# Patient Record
Sex: FEMALE | Race: WHITE | NOT HISPANIC OR LATINO | Employment: OTHER | RURAL
[De-identification: names, ages, dates, MRNs, and addresses within clinical notes are randomized per-mention and may not be internally consistent; named-entity substitution may affect disease eponyms.]

---

## 2018-05-24 ENCOUNTER — HISTORICAL (OUTPATIENT)
Dept: ADMINISTRATIVE | Facility: HOSPITAL | Age: 73
End: 2018-05-24

## 2018-05-25 LAB
LAB AP CLINICAL INFORMATION: NORMAL
LAB AP COMMENTS: NORMAL
LAB AP DIAGNOSIS - HISTORICAL: NORMAL
LAB AP GROSS PATHOLOGY - HISTORICAL: NORMAL
LAB AP SPECIMEN SUBMITTED - HISTORICAL: NORMAL

## 2018-10-11 ENCOUNTER — HISTORICAL (OUTPATIENT)
Dept: ADMINISTRATIVE | Facility: HOSPITAL | Age: 73
End: 2018-10-11

## 2018-10-12 LAB
LAB AP CLINICAL INFORMATION: NORMAL
LAB AP DIAGNOSIS - HISTORICAL: NORMAL
LAB AP GROSS PATHOLOGY - HISTORICAL: NORMAL
LAB AP SPECIMEN SUBMITTED - HISTORICAL: NORMAL

## 2019-03-28 ENCOUNTER — HISTORICAL (OUTPATIENT)
Dept: ADMINISTRATIVE | Facility: HOSPITAL | Age: 74
End: 2019-03-28

## 2020-06-12 ENCOUNTER — HISTORICAL (OUTPATIENT)
Dept: ADMINISTRATIVE | Facility: HOSPITAL | Age: 75
End: 2020-06-12

## 2020-06-12 LAB
C COLI+JEJ+UPSA DNA STL QL NAA+NON-PROBE: NEGATIVE
E COLI SXT1 STL QL IA: NEGATIVE
E COLI SXT2 STL QL IA: NEGATIVE
GLUCOSE SERPL-MCNC: 178 MG/DL (ref 70–105)
NOROVIRUS GI+II RNA STL QL NAA+NON-PROBE: NEGATIVE
RVA RNA STL QL NAA+NON-PROBE: NEGATIVE
S ENT+BONG DNA STL QL NAA+NON-PROBE: NEGATIVE
SHIGELLA SPECIES NAT: NEGATIVE
V CHOL+PARA+VUL DNA STL QL NAA+NON-PROBE: NEGATIVE
Y ENTEROCOL DNA STL QL NAA+NON-PROBE: NEGATIVE

## 2020-06-13 LAB — C DIFF TOX A+B STL IA-ACNC: NEGATIVE

## 2020-06-15 LAB

## 2021-05-13 ENCOUNTER — OFFICE VISIT (OUTPATIENT)
Dept: GASTROENTEROLOGY | Facility: CLINIC | Age: 76
End: 2021-05-13
Payer: MEDICARE

## 2021-05-13 VITALS
SYSTOLIC BLOOD PRESSURE: 148 MMHG | BODY MASS INDEX: 30.36 KG/M2 | DIASTOLIC BLOOD PRESSURE: 59 MMHG | WEIGHT: 165 LBS | HEIGHT: 62 IN | OXYGEN SATURATION: 96 % | HEART RATE: 64 BPM

## 2021-05-13 DIAGNOSIS — K21.9 GASTROESOPHAGEAL REFLUX DISEASE, UNSPECIFIED WHETHER ESOPHAGITIS PRESENT: ICD-10-CM

## 2021-05-13 DIAGNOSIS — K59.00 CONSTIPATION, UNSPECIFIED CONSTIPATION TYPE: ICD-10-CM

## 2021-05-13 DIAGNOSIS — R10.13 EPIGASTRIC PAIN: Primary | ICD-10-CM

## 2021-05-13 PROCEDURE — 99214 PR OFFICE/OUTPT VISIT, EST, LEVL IV, 30-39 MIN: ICD-10-PCS | Mod: ,,, | Performed by: NURSE PRACTITIONER

## 2021-05-13 PROCEDURE — 99214 OFFICE O/P EST MOD 30 MIN: CPT | Mod: ,,, | Performed by: NURSE PRACTITIONER

## 2021-05-13 RX ORDER — HYDRALAZINE HYDROCHLORIDE 50 MG/1
50 TABLET, FILM COATED ORAL DAILY
COMMUNITY

## 2021-05-13 RX ORDER — TORSEMIDE 20 MG/1
10 TABLET ORAL DAILY
COMMUNITY

## 2021-05-13 RX ORDER — CITALOPRAM 20 MG/1
20 TABLET, FILM COATED ORAL DAILY
COMMUNITY

## 2021-05-13 RX ORDER — MESALAMINE 800 MG/1
800 TABLET, DELAYED RELEASE ORAL 2 TIMES DAILY
COMMUNITY
End: 2021-10-08 | Stop reason: SDUPTHER

## 2021-05-13 RX ORDER — VERAPAMIL HYDROCHLORIDE 240 MG/1
240 CAPSULE, EXTENDED RELEASE ORAL DAILY
COMMUNITY

## 2021-05-13 RX ORDER — ALLOPURINOL 100 MG/1
200 TABLET ORAL DAILY
COMMUNITY

## 2021-05-13 RX ORDER — GLYBURIDE 5 MG/1
5 TABLET ORAL 2 TIMES DAILY WITH MEALS
COMMUNITY

## 2021-05-13 RX ORDER — LEVOTHYROXINE SODIUM 50 UG/1
50 TABLET ORAL
COMMUNITY

## 2021-05-13 RX ORDER — AMOXICILLIN 500 MG
CAPSULE ORAL DAILY
COMMUNITY

## 2021-05-13 RX ORDER — ESOMEPRAZOLE MAGNESIUM 40 MG/1
40 CAPSULE, DELAYED RELEASE ORAL DAILY
COMMUNITY

## 2021-05-13 RX ORDER — ASPIRIN 81 MG/1
81 TABLET ORAL DAILY
COMMUNITY

## 2021-05-13 RX ORDER — PRAVASTATIN SODIUM 40 MG/1
40 TABLET ORAL DAILY
COMMUNITY

## 2021-05-13 RX ORDER — ATENOLOL 25 MG/1
25 TABLET ORAL DAILY
COMMUNITY

## 2021-05-14 ENCOUNTER — TELEPHONE (OUTPATIENT)
Dept: GASTROENTEROLOGY | Facility: CLINIC | Age: 76
End: 2021-05-14

## 2021-05-14 DIAGNOSIS — Z11.59 SPECIAL SCREENING EXAMINATION FOR VIRAL DISEASE: Primary | ICD-10-CM

## 2021-05-14 DIAGNOSIS — D64.9 ANEMIA, UNSPECIFIED TYPE: Primary | ICD-10-CM

## 2021-05-18 ENCOUNTER — ANESTHESIA EVENT (OUTPATIENT)
Dept: GASTROENTEROLOGY | Facility: HOSPITAL | Age: 76
End: 2021-05-18
Payer: MEDICARE

## 2021-05-18 ENCOUNTER — HOSPITAL ENCOUNTER (OUTPATIENT)
Dept: GASTROENTEROLOGY | Facility: HOSPITAL | Age: 76
Discharge: HOME | End: 2021-05-18
Attending: NURSE PRACTITIONER
Payer: MEDICARE

## 2021-05-18 ENCOUNTER — ANESTHESIA (OUTPATIENT)
Dept: GASTROENTEROLOGY | Facility: HOSPITAL | Age: 76
End: 2021-05-18
Payer: MEDICARE

## 2021-05-18 VITALS
SYSTOLIC BLOOD PRESSURE: 153 MMHG | RESPIRATION RATE: 13 BRPM | OXYGEN SATURATION: 97 % | DIASTOLIC BLOOD PRESSURE: 69 MMHG | TEMPERATURE: 97 F | HEART RATE: 63 BPM

## 2021-05-18 DIAGNOSIS — R10.13 EPIGASTRIC PAIN: ICD-10-CM

## 2021-05-18 DIAGNOSIS — K44.9 HH (HIATUS HERNIA): ICD-10-CM

## 2021-05-18 DIAGNOSIS — K21.9 GASTROESOPHAGEAL REFLUX DISEASE, UNSPECIFIED WHETHER ESOPHAGITIS PRESENT: ICD-10-CM

## 2021-05-18 DIAGNOSIS — K29.00 ACUTE SUPERFICIAL GASTRITIS WITHOUT HEMORRHAGE: ICD-10-CM

## 2021-05-18 PROCEDURE — C1889 IMPLANT/INSERT DEVICE, NOC: HCPCS

## 2021-05-18 PROCEDURE — 88305 SURGICAL PATHOLOGY: ICD-10-PCS | Mod: 26,,, | Performed by: PATHOLOGY

## 2021-05-18 PROCEDURE — D9220A PRA ANESTHESIA: ICD-10-PCS | Mod: ,,, | Performed by: NURSE ANESTHETIST, CERTIFIED REGISTERED

## 2021-05-18 PROCEDURE — 27000284 HC CANNULA NASAL: Performed by: NURSE ANESTHETIST, CERTIFIED REGISTERED

## 2021-05-18 PROCEDURE — 37000008 HC ANESTHESIA 1ST 15 MINUTES

## 2021-05-18 PROCEDURE — 88305 TISSUE EXAM BY PATHOLOGIST: CPT | Mod: SUR | Performed by: INTERNAL MEDICINE

## 2021-05-18 PROCEDURE — D9220A PRA ANESTHESIA: Mod: ,,, | Performed by: NURSE ANESTHETIST, CERTIFIED REGISTERED

## 2021-05-18 PROCEDURE — 25000003 PHARM REV CODE 250: Performed by: NURSE ANESTHETIST, CERTIFIED REGISTERED

## 2021-05-18 PROCEDURE — 88342 IMHCHEM/IMCYTCHM 1ST ANTB: CPT | Mod: 26,,, | Performed by: PATHOLOGY

## 2021-05-18 PROCEDURE — 63600175 PHARM REV CODE 636 W HCPCS: Performed by: NURSE ANESTHETIST, CERTIFIED REGISTERED

## 2021-05-18 PROCEDURE — 27201423 OPTIME MED/SURG SUP & DEVICES STERILE SUPPLY

## 2021-05-18 PROCEDURE — 43239 EGD BIOPSY SINGLE/MULTIPLE: CPT

## 2021-05-18 PROCEDURE — 88342 SURGICAL PATHOLOGY: ICD-10-PCS | Mod: 26,,, | Performed by: PATHOLOGY

## 2021-05-18 PROCEDURE — 88305 TISSUE EXAM BY PATHOLOGIST: CPT | Mod: 26,,, | Performed by: PATHOLOGY

## 2021-05-18 RX ORDER — SODIUM CHLORIDE 0.9 % (FLUSH) 0.9 %
10 SYRINGE (ML) INJECTION
Status: DISCONTINUED | OUTPATIENT
Start: 2021-05-18 | End: 2021-05-19 | Stop reason: HOSPADM

## 2021-05-18 RX ORDER — PROPOFOL 10 MG/ML
VIAL (ML) INTRAVENOUS
Status: DISCONTINUED | OUTPATIENT
Start: 2021-05-18 | End: 2021-05-18

## 2021-05-18 RX ORDER — SUCRALFATE 1 G/1
1 TABLET ORAL 4 TIMES DAILY PRN
Qty: 100 TABLET | Refills: 1 | Status: SHIPPED | OUTPATIENT
Start: 2021-05-18 | End: 2021-12-13 | Stop reason: SDUPTHER

## 2021-05-18 RX ORDER — LIDOCAINE HYDROCHLORIDE 20 MG/ML
INJECTION INTRAVENOUS
Status: DISCONTINUED | OUTPATIENT
Start: 2021-05-18 | End: 2021-05-18

## 2021-05-18 RX ORDER — ONDANSETRON 2 MG/ML
INJECTION INTRAMUSCULAR; INTRAVENOUS
Status: DISCONTINUED | OUTPATIENT
Start: 2021-05-18 | End: 2021-05-18

## 2021-05-18 RX ADMIN — SODIUM CHLORIDE: 9 INJECTION, SOLUTION INTRAVENOUS at 11:05

## 2021-05-18 RX ADMIN — PROPOFOL 50 MG: 10 INJECTION, EMULSION INTRAVENOUS at 11:05

## 2021-05-18 RX ADMIN — LIDOCAINE HYDROCHLORIDE 50 MG: 20 INJECTION, SOLUTION INTRAVENOUS at 11:05

## 2021-05-18 RX ADMIN — ONDANSETRON 4 MG: 2 INJECTION INTRAMUSCULAR; INTRAVENOUS at 12:05

## 2021-05-19 ENCOUNTER — TELEPHONE (OUTPATIENT)
Dept: GASTROENTEROLOGY | Facility: CLINIC | Age: 76
End: 2021-05-19

## 2021-05-19 LAB
DHEA SERPL-MCNC: NORMAL
ESTROGEN SERPL-MCNC: NORMAL PG/ML
LAB AP GROSS DESCRIPTION: NORMAL
LAB AP LABORATORY NOTES: NORMAL
T3RU NFR SERPL: NORMAL %

## 2021-05-21 ENCOUNTER — HOSPITAL ENCOUNTER (OUTPATIENT)
Dept: RADIOLOGY | Facility: HOSPITAL | Age: 76
Discharge: HOME | End: 2021-05-21
Attending: NURSE PRACTITIONER
Payer: MEDICARE

## 2021-05-21 DIAGNOSIS — R10.13 EPIGASTRIC PAIN: ICD-10-CM

## 2021-05-21 DIAGNOSIS — K21.9 GASTROESOPHAGEAL REFLUX DISEASE, UNSPECIFIED WHETHER ESOPHAGITIS PRESENT: ICD-10-CM

## 2021-05-21 PROCEDURE — 76700 US EXAM ABDOM COMPLETE: CPT | Mod: 26,,, | Performed by: RADIOLOGY

## 2021-05-21 PROCEDURE — 76700 US ABDOMEN COMPLETE: ICD-10-PCS | Mod: 26,,, | Performed by: RADIOLOGY

## 2021-05-21 PROCEDURE — 76700 US EXAM ABDOM COMPLETE: CPT | Mod: TC

## 2021-05-24 ENCOUNTER — TELEPHONE (OUTPATIENT)
Dept: GASTROENTEROLOGY | Facility: CLINIC | Age: 76
End: 2021-05-24

## 2021-07-19 ENCOUNTER — TELEPHONE (OUTPATIENT)
Dept: GASTROENTEROLOGY | Facility: CLINIC | Age: 76
End: 2021-07-19

## 2021-08-17 ENCOUNTER — OFFICE VISIT (OUTPATIENT)
Dept: GASTROENTEROLOGY | Facility: CLINIC | Age: 76
End: 2021-08-17
Payer: MEDICARE

## 2021-08-17 VITALS
SYSTOLIC BLOOD PRESSURE: 130 MMHG | HEART RATE: 67 BPM | HEIGHT: 62 IN | WEIGHT: 161 LBS | BODY MASS INDEX: 29.63 KG/M2 | OXYGEN SATURATION: 96 % | DIASTOLIC BLOOD PRESSURE: 56 MMHG

## 2021-08-17 DIAGNOSIS — K59.00 CONSTIPATION, UNSPECIFIED CONSTIPATION TYPE: ICD-10-CM

## 2021-08-17 DIAGNOSIS — K21.9 GASTROESOPHAGEAL REFLUX DISEASE, UNSPECIFIED WHETHER ESOPHAGITIS PRESENT: ICD-10-CM

## 2021-08-17 DIAGNOSIS — R63.4 WEIGHT LOSS: ICD-10-CM

## 2021-08-17 DIAGNOSIS — K62.89 RECTAL PAIN: ICD-10-CM

## 2021-08-17 DIAGNOSIS — R10.9 ABDOMINAL PAIN, UNSPECIFIED ABDOMINAL LOCATION: ICD-10-CM

## 2021-08-17 DIAGNOSIS — K62.5 RECTAL BLEEDING: Primary | ICD-10-CM

## 2021-08-17 PROCEDURE — 99214 PR OFFICE/OUTPT VISIT, EST, LEVL IV, 30-39 MIN: ICD-10-PCS | Mod: ,,, | Performed by: NURSE PRACTITIONER

## 2021-08-17 PROCEDURE — 99214 OFFICE O/P EST MOD 30 MIN: CPT | Mod: ,,, | Performed by: NURSE PRACTITIONER

## 2021-08-17 RX ORDER — HYDROCORTISONE ACETATE 25 MG/1
25 SUPPOSITORY RECTAL 2 TIMES DAILY
Qty: 20 SUPPOSITORY | Refills: 0 | Status: SHIPPED | OUTPATIENT
Start: 2021-08-17 | End: 2021-08-27

## 2021-09-10 ENCOUNTER — HOSPITAL ENCOUNTER (OUTPATIENT)
Dept: RADIOLOGY | Facility: HOSPITAL | Age: 76
Discharge: HOME | End: 2021-09-10
Attending: NURSE PRACTITIONER
Payer: MEDICARE

## 2021-09-10 DIAGNOSIS — R10.9 ABDOMINAL PAIN, UNSPECIFIED ABDOMINAL LOCATION: ICD-10-CM

## 2021-09-10 PROCEDURE — 74176 CT ABD & PELVIS W/O CONTRAST: CPT | Mod: TC

## 2021-09-10 PROCEDURE — 74176 CT ABDOMEN PELVIS WITHOUT CONTRAST: ICD-10-PCS | Mod: 26,,, | Performed by: RADIOLOGY

## 2021-09-10 PROCEDURE — 74176 CT ABD & PELVIS W/O CONTRAST: CPT | Mod: 26,,, | Performed by: RADIOLOGY

## 2021-09-13 ENCOUNTER — OFFICE VISIT (OUTPATIENT)
Dept: GASTROENTEROLOGY | Facility: CLINIC | Age: 76
End: 2021-09-13
Payer: MEDICARE

## 2021-09-13 VITALS
HEIGHT: 62 IN | DIASTOLIC BLOOD PRESSURE: 47 MMHG | WEIGHT: 162 LBS | OXYGEN SATURATION: 96 % | BODY MASS INDEX: 29.81 KG/M2 | SYSTOLIC BLOOD PRESSURE: 127 MMHG | HEART RATE: 73 BPM

## 2021-09-13 DIAGNOSIS — K44.9 HIATAL HERNIA: Primary | ICD-10-CM

## 2021-09-13 DIAGNOSIS — K59.00 CONSTIPATION, UNSPECIFIED CONSTIPATION TYPE: ICD-10-CM

## 2021-09-13 DIAGNOSIS — K62.5 RECTAL BLEEDING: Primary | ICD-10-CM

## 2021-09-13 DIAGNOSIS — R93.89 ABNORMAL CT SCAN: ICD-10-CM

## 2021-09-13 DIAGNOSIS — K44.9 HH (HIATUS HERNIA): ICD-10-CM

## 2021-09-13 DIAGNOSIS — R10.13 EPIGASTRIC PAIN: ICD-10-CM

## 2021-09-13 DIAGNOSIS — K21.9 GASTROESOPHAGEAL REFLUX DISEASE, UNSPECIFIED WHETHER ESOPHAGITIS PRESENT: ICD-10-CM

## 2021-09-13 PROCEDURE — 99214 PR OFFICE/OUTPT VISIT, EST, LEVL IV, 30-39 MIN: ICD-10-PCS | Mod: ,,, | Performed by: NURSE PRACTITIONER

## 2021-09-13 PROCEDURE — 99214 OFFICE O/P EST MOD 30 MIN: CPT | Mod: ,,, | Performed by: NURSE PRACTITIONER

## 2021-10-04 DIAGNOSIS — Z01.818 PRE-OP TESTING: ICD-10-CM

## 2021-10-08 ENCOUNTER — HOSPITAL ENCOUNTER (OUTPATIENT)
Dept: GASTROENTEROLOGY | Facility: HOSPITAL | Age: 76
Discharge: HOME | End: 2021-10-08
Attending: NURSE PRACTITIONER
Payer: MEDICARE

## 2021-10-08 ENCOUNTER — ANESTHESIA (OUTPATIENT)
Dept: GASTROENTEROLOGY | Facility: HOSPITAL | Age: 76
End: 2021-10-08
Payer: MEDICARE

## 2021-10-08 ENCOUNTER — ANESTHESIA EVENT (OUTPATIENT)
Dept: GASTROENTEROLOGY | Facility: HOSPITAL | Age: 76
End: 2021-10-08
Payer: MEDICARE

## 2021-10-08 VITALS
RESPIRATION RATE: 17 BRPM | OXYGEN SATURATION: 98 % | SYSTOLIC BLOOD PRESSURE: 149 MMHG | BODY MASS INDEX: 28.35 KG/M2 | HEART RATE: 79 BPM | DIASTOLIC BLOOD PRESSURE: 58 MMHG | TEMPERATURE: 97 F | WEIGHT: 155 LBS

## 2021-10-08 DIAGNOSIS — K51.211 ULCERATIVE PROCTITIS WITH RECTAL BLEEDING: ICD-10-CM

## 2021-10-08 DIAGNOSIS — K62.5 RECTAL BLEEDING: Chronic | ICD-10-CM

## 2021-10-08 DIAGNOSIS — K62.89 RECTAL PAIN: ICD-10-CM

## 2021-10-08 DIAGNOSIS — K57.30 DIVERTICULA, COLON: ICD-10-CM

## 2021-10-08 PROBLEM — K51.20 ULCERATIVE PROCTITIS: Status: ACTIVE | Noted: 2021-10-08

## 2021-10-08 LAB
GLUCOSE SERPL-MCNC: 251 MG/DL (ref 70–105)
GLUCOSE SERPL-MCNC: 251 MG/DL (ref 70–110)

## 2021-10-08 PROCEDURE — 88305 SURGICAL PATHOLOGY: ICD-10-PCS | Mod: 26,,, | Performed by: PATHOLOGY

## 2021-10-08 PROCEDURE — 88341 SURGICAL PATHOLOGY: ICD-10-PCS | Mod: 26,,, | Performed by: PATHOLOGY

## 2021-10-08 PROCEDURE — D9220A PRA ANESTHESIA: Mod: ,,,

## 2021-10-08 PROCEDURE — 45380 COLONOSCOPY AND BIOPSY: CPT

## 2021-10-08 PROCEDURE — 88305 TISSUE EXAM BY PATHOLOGIST: CPT | Mod: SUR | Performed by: INTERNAL MEDICINE

## 2021-10-08 PROCEDURE — C1889 IMPLANT/INSERT DEVICE, NOC: HCPCS

## 2021-10-08 PROCEDURE — 27201423 OPTIME MED/SURG SUP & DEVICES STERILE SUPPLY

## 2021-10-08 PROCEDURE — D9220A PRA ANESTHESIA: ICD-10-PCS | Mod: ,,,

## 2021-10-08 PROCEDURE — 88342 IMHCHEM/IMCYTCHM 1ST ANTB: CPT | Mod: 26,,, | Performed by: PATHOLOGY

## 2021-10-08 PROCEDURE — 82962 GLUCOSE BLOOD TEST: CPT

## 2021-10-08 PROCEDURE — 37000008 HC ANESTHESIA 1ST 15 MINUTES

## 2021-10-08 PROCEDURE — 88342 SURGICAL PATHOLOGY: ICD-10-PCS | Mod: 26,,, | Performed by: PATHOLOGY

## 2021-10-08 PROCEDURE — 37000009 HC ANESTHESIA EA ADD 15 MINS

## 2021-10-08 PROCEDURE — 88305 TISSUE EXAM BY PATHOLOGIST: CPT | Mod: 26,,, | Performed by: PATHOLOGY

## 2021-10-08 PROCEDURE — 63600175 PHARM REV CODE 636 W HCPCS

## 2021-10-08 PROCEDURE — 25000003 PHARM REV CODE 250

## 2021-10-08 PROCEDURE — 88341 IMHCHEM/IMCYTCHM EA ADD ANTB: CPT | Mod: 26,,, | Performed by: PATHOLOGY

## 2021-10-08 RX ORDER — PROPOFOL 10 MG/ML
VIAL (ML) INTRAVENOUS
Status: DISCONTINUED | OUTPATIENT
Start: 2021-10-08 | End: 2021-10-08

## 2021-10-08 RX ORDER — MESALAMINE 800 MG/1
1600 TABLET, DELAYED RELEASE ORAL 3 TIMES DAILY
Qty: 180 TABLET | Refills: 11 | Status: SHIPPED | OUTPATIENT
Start: 2021-10-08 | End: 2021-10-12 | Stop reason: SDUPTHER

## 2021-10-08 RX ORDER — SODIUM CHLORIDE 0.9 % (FLUSH) 0.9 %
10 SYRINGE (ML) INJECTION
Status: DISCONTINUED | OUTPATIENT
Start: 2021-10-08 | End: 2021-10-09 | Stop reason: HOSPADM

## 2021-10-08 RX ORDER — SODIUM CHLORIDE 9 MG/ML
INJECTION, SOLUTION INTRAVENOUS CONTINUOUS PRN
Status: DISCONTINUED | OUTPATIENT
Start: 2021-10-08 | End: 2021-10-08

## 2021-10-08 RX ORDER — LIDOCAINE HYDROCHLORIDE 20 MG/ML
INJECTION, SOLUTION EPIDURAL; INFILTRATION; INTRACAUDAL; PERINEURAL
Status: DISCONTINUED | OUTPATIENT
Start: 2021-10-08 | End: 2021-10-08

## 2021-10-08 RX ADMIN — LIDOCAINE HYDROCHLORIDE 50 MG: 20 INJECTION, SOLUTION EPIDURAL; INFILTRATION; INTRACAUDAL; PERINEURAL at 12:10

## 2021-10-08 RX ADMIN — PROPOFOL 40 MG: 10 INJECTION, EMULSION INTRAVENOUS at 12:10

## 2021-10-08 RX ADMIN — PROPOFOL 80 MG: 10 INJECTION, EMULSION INTRAVENOUS at 12:10

## 2021-10-08 RX ADMIN — SODIUM CHLORIDE: 9 INJECTION, SOLUTION INTRAVENOUS at 12:10

## 2021-10-12 DIAGNOSIS — K51.90 ULCERATIVE COLITIS WITHOUT COMPLICATIONS, UNSPECIFIED LOCATION: Primary | ICD-10-CM

## 2021-10-12 RX ORDER — MESALAMINE 800 MG/1
1600 TABLET, DELAYED RELEASE ORAL 3 TIMES DAILY
Qty: 540 TABLET | Refills: 3 | Status: SHIPPED | OUTPATIENT
Start: 2021-10-12 | End: 2021-11-08 | Stop reason: SDUPTHER

## 2021-10-19 ENCOUNTER — OFFICE VISIT (OUTPATIENT)
Dept: GASTROENTEROLOGY | Facility: CLINIC | Age: 76
End: 2021-10-19
Payer: MEDICARE

## 2021-10-19 VITALS
DIASTOLIC BLOOD PRESSURE: 60 MMHG | BODY MASS INDEX: 28.89 KG/M2 | WEIGHT: 157 LBS | SYSTOLIC BLOOD PRESSURE: 147 MMHG | HEART RATE: 64 BPM | HEIGHT: 62 IN | OXYGEN SATURATION: 96 %

## 2021-10-19 DIAGNOSIS — Z79.899 HIGH RISK MEDICATION USE: ICD-10-CM

## 2021-10-19 DIAGNOSIS — R10.13 EPIGASTRIC PAIN: ICD-10-CM

## 2021-10-19 DIAGNOSIS — K62.5 RECTAL BLEEDING: Chronic | ICD-10-CM

## 2021-10-19 DIAGNOSIS — K44.9 HH (HIATUS HERNIA): ICD-10-CM

## 2021-10-19 DIAGNOSIS — K51.919 ULCERATIVE COLITIS WITH COMPLICATION, UNSPECIFIED LOCATION: Primary | ICD-10-CM

## 2021-10-19 PROCEDURE — 99214 OFFICE O/P EST MOD 30 MIN: CPT | Mod: ,,, | Performed by: NURSE PRACTITIONER

## 2021-10-19 PROCEDURE — 99214 PR OFFICE/OUTPT VISIT, EST, LEVL IV, 30-39 MIN: ICD-10-PCS | Mod: ,,, | Performed by: NURSE PRACTITIONER

## 2021-10-20 ENCOUNTER — TELEPHONE (OUTPATIENT)
Dept: GASTROENTEROLOGY | Facility: CLINIC | Age: 76
End: 2021-10-20

## 2021-11-08 DIAGNOSIS — K51.90 ULCERATIVE COLITIS WITHOUT COMPLICATIONS, UNSPECIFIED LOCATION: ICD-10-CM

## 2021-11-08 RX ORDER — MESALAMINE 800 MG/1
1600 TABLET, DELAYED RELEASE ORAL 2 TIMES DAILY
Qty: 360 TABLET | Refills: 3 | Status: SHIPPED | OUTPATIENT
Start: 2021-11-08 | End: 2021-12-13 | Stop reason: SDUPTHER

## 2021-11-29 ENCOUNTER — TELEPHONE (OUTPATIENT)
Dept: GASTROENTEROLOGY | Facility: CLINIC | Age: 76
End: 2021-11-29
Payer: MEDICARE

## 2021-12-13 ENCOUNTER — OFFICE VISIT (OUTPATIENT)
Dept: GASTROENTEROLOGY | Facility: CLINIC | Age: 76
End: 2021-12-13
Payer: MEDICARE

## 2021-12-13 VITALS
BODY MASS INDEX: 27.42 KG/M2 | HEIGHT: 62 IN | SYSTOLIC BLOOD PRESSURE: 129 MMHG | OXYGEN SATURATION: 97 % | DIASTOLIC BLOOD PRESSURE: 43 MMHG | WEIGHT: 149 LBS | HEART RATE: 64 BPM

## 2021-12-13 DIAGNOSIS — K51.90 ULCERATIVE COLITIS WITHOUT COMPLICATIONS, UNSPECIFIED LOCATION: ICD-10-CM

## 2021-12-13 DIAGNOSIS — K21.9 GASTROESOPHAGEAL REFLUX DISEASE, UNSPECIFIED WHETHER ESOPHAGITIS PRESENT: ICD-10-CM

## 2021-12-13 DIAGNOSIS — R10.13 EPIGASTRIC PAIN: ICD-10-CM

## 2021-12-13 DIAGNOSIS — E61.1 IRON DEFICIENCY: ICD-10-CM

## 2021-12-13 DIAGNOSIS — K51.919 ULCERATIVE COLITIS WITH COMPLICATION, UNSPECIFIED LOCATION: Primary | ICD-10-CM

## 2021-12-13 DIAGNOSIS — K62.5 RECTAL BLEEDING: Chronic | ICD-10-CM

## 2021-12-13 DIAGNOSIS — K51.211 ULCERATIVE PROCTITIS WITH RECTAL BLEEDING: ICD-10-CM

## 2021-12-13 PROCEDURE — 99214 PR OFFICE/OUTPT VISIT, EST, LEVL IV, 30-39 MIN: ICD-10-PCS | Mod: ,,, | Performed by: NURSE PRACTITIONER

## 2021-12-13 PROCEDURE — 99214 OFFICE O/P EST MOD 30 MIN: CPT | Mod: ,,, | Performed by: NURSE PRACTITIONER

## 2021-12-13 RX ORDER — MESALAMINE 800 MG/1
1600 TABLET, DELAYED RELEASE ORAL 3 TIMES DAILY
Qty: 540 TABLET | Refills: 3 | Status: SHIPPED | OUTPATIENT
Start: 2021-12-13 | End: 2022-04-05 | Stop reason: SDUPTHER

## 2021-12-13 RX ORDER — SUCRALFATE 1 G/1
1 TABLET ORAL 4 TIMES DAILY PRN
Qty: 100 TABLET | Refills: 1 | Status: SHIPPED | OUTPATIENT
Start: 2021-12-13 | End: 2022-04-01 | Stop reason: SDUPTHER

## 2021-12-14 ENCOUNTER — TELEPHONE (OUTPATIENT)
Dept: GASTROENTEROLOGY | Facility: CLINIC | Age: 76
End: 2021-12-14
Payer: MEDICARE

## 2022-01-24 ENCOUNTER — OFFICE VISIT (OUTPATIENT)
Dept: GASTROENTEROLOGY | Facility: CLINIC | Age: 77
End: 2022-01-24
Payer: MEDICARE

## 2022-01-24 VITALS
HEIGHT: 62 IN | BODY MASS INDEX: 27.23 KG/M2 | OXYGEN SATURATION: 97 % | HEART RATE: 71 BPM | WEIGHT: 148 LBS | SYSTOLIC BLOOD PRESSURE: 129 MMHG | DIASTOLIC BLOOD PRESSURE: 48 MMHG

## 2022-01-24 DIAGNOSIS — K51.919 ULCERATIVE COLITIS WITH COMPLICATION, UNSPECIFIED LOCATION: Primary | ICD-10-CM

## 2022-01-24 DIAGNOSIS — K21.9 GASTROESOPHAGEAL REFLUX DISEASE, UNSPECIFIED WHETHER ESOPHAGITIS PRESENT: ICD-10-CM

## 2022-01-24 DIAGNOSIS — K62.89 RECTAL PAIN: ICD-10-CM

## 2022-01-24 DIAGNOSIS — R19.7 DIARRHEA, UNSPECIFIED TYPE: ICD-10-CM

## 2022-01-24 DIAGNOSIS — E61.1 IRON DEFICIENCY: ICD-10-CM

## 2022-01-24 PROCEDURE — 99214 PR OFFICE/OUTPT VISIT, EST, LEVL IV, 30-39 MIN: ICD-10-PCS | Mod: ,,, | Performed by: NURSE PRACTITIONER

## 2022-01-24 PROCEDURE — 99214 OFFICE O/P EST MOD 30 MIN: CPT | Mod: ,,, | Performed by: NURSE PRACTITIONER

## 2022-01-24 NOTE — PROGRESS NOTES
Jose Russell is a 76 y.o. female here for Follow-up        PCP: Johana Corbin  Referring Provider: No referring provider defined for this encounter.     HPI:  Presents for follow up UC. Increased Asacol dose has not improved the patient's symptoms. She is reporting going to the bathroom very small amounts up to 12 times daily. She reports occasional hematochezia. Frequently stool does contain mucous. Abdominal cramping prior to BM. Appetite is decreased. Also recent COVID infection within the last few months. States that if she does not take miralax she has hard stool that increases rectal pain. TB gold, Hepatitis B and C negative. HIV negative. Long discussion with patient concerning recommendation for biologic therapy. Advised of the increased risk of infection including fungal and TB. Also discussed increased risk of cancer such as lymphoma. Patient verbalized understanding of increased risks and side effects associated with biologic therapy. She has received IV iron twice through Alyssa Michele NP. She has also had recent lab and wishes for us to obtain results from her. Last colonoscopy 10/18/21 with active moderate colitis. Last EGD 5/18/21, 3 cm hernia. She did point out a dry scaly rash in scalp and just above the right eye.         ROS:  Review of Systems   Constitutional: Positive for appetite change (decreased) and fatigue. Negative for fever and unexpected weight change.   HENT: Negative for trouble swallowing.    Respiratory: Negative for shortness of breath and wheezing.    Cardiovascular: Negative for chest pain and palpitations.   Gastrointestinal: Positive for abdominal pain, blood in stool, diarrhea and fecal incontinence. Negative for anal bleeding, change in bowel habit, constipation, nausea, rectal pain, vomiting, reflux and change in bowel habit.   Genitourinary: Negative for dysuria, frequency and urgency.   Musculoskeletal: Negative for back pain.   Integumentary:  Positive for  rash. Negative for pallor.   Allergic/Immunologic: Negative for food allergies.   Neurological: Negative for dizziness, weakness and light-headedness.   Hematological: Does not bruise/bleed easily.   Psychiatric/Behavioral: The patient is not nervous/anxious.           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    PSHX:  has a past surgical history that includes Colonoscopy (06/12/2020); Esophagogastroduodenoscopy (05/24/2018); Hysterectomy; Cholecystectomy; and Nephrectomy.    PFHX: family history is not on file.    PSlHX:  reports that she has never smoked. She has never used smokeless tobacco. She reports that she does not drink alcohol and does not use drugs.        Review of patient's allergies indicates:   Allergen Reactions    Levofloxacin in d5w     Mercaptopurine analogues (thiopurines)     Penicillins     Sulfa (sulfonamide antibiotics)        Medication List with Changes/Refills   Current Medications    ALLOPURINOL (ZYLOPRIM) 100 MG TABLET    Take 100 mg by mouth once daily.    ASPIRIN (ECOTRIN) 81 MG EC TABLET    Take 81 mg by mouth once daily.    ATENOLOL (TENORMIN) 25 MG TABLET    Take 25 mg by mouth once daily.    BIFIDOBACTERIUM INFANTIS (ALIGN ORAL)    Take by mouth once daily.    CITALOPRAM (CELEXA) 20 MG TABLET    Take 20 mg by mouth once daily.    ESOMEPRAZOLE (NEXIUM) 40 MG CAPSULE    Take 40 mg by mouth once daily.    GLYBURIDE (DIABETA) 5 MG TABLET    Take 5 mg by mouth 2 (two) times daily with meals.    HYDRALAZINE (APRESOLINE) 50 MG TABLET    Take 50 mg by mouth every 12 (twelve) hours.    LEVOTHYROXINE (SYNTHROID) 50 MCG TABLET    Take 50 mcg by mouth before breakfast.    MESALAMINE (ASACOL HD) 800 MG TBEC    Take 2 tablets (1,600 mg  "total) by mouth 3 (three) times daily.    OMEGA-3 FATTY ACIDS/FISH OIL (FISH OIL-OMEGA-3 FATTY ACIDS) 300-1,000 MG CAPSULE    Take by mouth once daily.    PRAVASTATIN (PRAVACHOL) 40 MG TABLET    Take 40 mg by mouth once daily.    SUCRALFATE (CARAFATE) 1 GRAM TABLET    Take 1 tablet (1 g total) by mouth 4 (four) times daily as needed (Epigastric pain).    TORSEMIDE (DEMADEX) 20 MG TAB    Take 10 mg by mouth once daily.    VERAPAMIL (VERELAN) 240 MG C24P    Take 240 mg by mouth once daily.        Objective Findings:  Vital Signs:  BP (!) 129/48   Pulse 71   Ht 5' 2" (1.575 m)   Wt 67.1 kg (148 lb)   SpO2 97%   BMI 27.07 kg/m²  Body mass index is 27.07 kg/m².    Physical Exam:  Physical Exam  Vitals and nursing note reviewed.   Constitutional:       General: She is not in acute distress.     Appearance: Normal appearance.   HENT:      Mouth/Throat:      Mouth: Mucous membranes are moist.   Eyes:      General: No scleral icterus.  Cardiovascular:      Rate and Rhythm: Normal rate and regular rhythm.      Heart sounds: No murmur heard.      Pulmonary:      Breath sounds: No wheezing, rhonchi or rales.   Abdominal:      General: Bowel sounds are normal. There is no distension.      Palpations: Abdomen is soft. There is no mass.      Tenderness: There is no abdominal tenderness. There is no guarding or rebound.      Hernia: No hernia is present.   Musculoskeletal:      Right lower leg: No edema.      Left lower leg: No edema.   Skin:     General: Skin is warm and dry.      Coloration: Skin is pale. Skin is not jaundiced.      Findings: No bruising or rash.   Neurological:      Mental Status: She is alert and oriented to person, place, and time.   Psychiatric:         Mood and Affect: Mood normal.          Labs:  Lab Results   Component Value Date    WBC 7.22 12/13/2021    HGB 10.5 (L) 12/13/2021    HCT 34.4 (L) 12/13/2021    MCV 84.3 12/13/2021    RDW 16.4 (H) 12/13/2021     12/13/2021    LYMPH 16.9 (L) " 12/13/2021    LYMPH 1.22 12/13/2021    MONO 7.1 (H) 12/13/2021    EOS 0.12 12/13/2021    BASO 0.03 12/13/2021     Lab Results   Component Value Date     12/13/2021    K 4.7 12/13/2021     12/13/2021    CO2 27 12/13/2021     (H) 12/13/2021    BUN 24 (H) 12/13/2021    CREATININE 1.15 (H) 12/13/2021    CALCIUM 9.9 12/13/2021    PROT 7.0 12/13/2021    ALBUMIN 3.4 (L) 12/13/2021    BILITOT 0.3 12/13/2021    ALKPHOS 120 12/13/2021    AST 34 12/13/2021    ALT 30 12/13/2021         Imaging: No results found.      Assessment:  Jose Russell is a 76 y.o. female here with:  1. Ulcerative colitis with complication, unspecified location    2. Rectal pain    3. Gastroesophageal reflux disease, unspecified whether esophagitis present    4. Iron deficiency          Recommendations:  1. Stool studies to rule out infection  2. Plan to start Remicade infusion 5 mg/kg at week 0, 2, 6, then every 8 weeks after stool results are reviewed  3. No NSAID's. Continue Asacol at this time  4. Increase water intake to 64 ounces daily to decrease dehydration  5. See PCM to discuss dermatology referral      Follow up in about 2 weeks (around 2/7/2022).      Order summary:       Thank you for allowing me to participate in the care of Jose Russell.      CHRIS Abdi

## 2022-01-27 DIAGNOSIS — R19.7 DIARRHEA, UNSPECIFIED TYPE: Primary | ICD-10-CM

## 2022-01-27 LAB
C COLI+JEJ+UPSA DNA STL QL NAA+NON-PROBE: NEGATIVE
E COLI SXT1 STL QL IA: POSITIVE
E COLI SXT2 STL QL IA: NEGATIVE
FATTY ACIDS: NORMAL /HPF
FECAL LEUKOCYTES: NORMAL /HPF
GIARDIA ANTIGEN: NEGATIVE
NEUTRAL FATS: NORMAL /HPF
NOROVIRUS GI+II RNA STL QL NAA+NON-PROBE: NEGATIVE
OCCULT BLOOD, SPECIMEN 2: NEGATIVE
OCCULT BLOOD, SPECIMEN 3: NEGATIVE
OCCULT BLOOD: NEGATIVE
RVA RNA STL QL NAA+NON-PROBE: NEGATIVE
S ENT+BONG DNA STL QL NAA+NON-PROBE: NEGATIVE
SHIGELLA SPECIES NAT: NEGATIVE
V CHOL+PARA+VUL DNA STL QL NAA+NON-PROBE: NEGATIVE
Y ENTEROCOL DNA STL QL NAA+NON-PROBE: NEGATIVE

## 2022-01-27 PROCEDURE — 87493 C DIFF TOXIN BY PCR: ICD-10-PCS | Mod: 59,,, | Performed by: CLINICAL MEDICAL LABORATORY

## 2022-01-27 PROCEDURE — 87209 SMEAR COMPLEX STAIN: CPT | Mod: ,,, | Performed by: CLINICAL MEDICAL LABORATORY

## 2022-01-27 PROCEDURE — 87329 GIARDIA AG IA: CPT | Mod: 59,,, | Performed by: CLINICAL MEDICAL LABORATORY

## 2022-01-27 PROCEDURE — 82705 FECAL FAT, QUALITATIVE: ICD-10-PCS | Mod: ,,, | Performed by: CLINICAL MEDICAL LABORATORY

## 2022-01-27 PROCEDURE — 87329 GIARDIA ANTIGEN: ICD-10-PCS | Mod: 59,,, | Performed by: CLINICAL MEDICAL LABORATORY

## 2022-01-27 PROCEDURE — 89055 LEUKOCYTE ASSESSMENT FECAL: CPT | Mod: ,,, | Performed by: CLINICAL MEDICAL LABORATORY

## 2022-01-27 PROCEDURE — 89055 FECAL LEUKOCYTES: ICD-10-PCS | Mod: ,,, | Performed by: CLINICAL MEDICAL LABORATORY

## 2022-01-27 PROCEDURE — 87209 OVA AND PARASITE EXAM: ICD-10-PCS | Mod: ,,, | Performed by: CLINICAL MEDICAL LABORATORY

## 2022-01-27 PROCEDURE — 87506 ENTERIC PATHOGEN PANEL: ICD-10-PCS | Mod: ,,, | Performed by: CLINICAL MEDICAL LABORATORY

## 2022-01-27 PROCEDURE — 87506 IADNA-DNA/RNA PROBE TQ 6-11: CPT | Mod: ,,, | Performed by: CLINICAL MEDICAL LABORATORY

## 2022-01-27 PROCEDURE — 82270 OCCULT BLOOD X 3, STOOL: ICD-10-PCS | Mod: ,,, | Performed by: CLINICAL MEDICAL LABORATORY

## 2022-01-27 PROCEDURE — 87177 OVA AND PARASITE EXAM: ICD-10-PCS | Mod: ,,, | Performed by: CLINICAL MEDICAL LABORATORY

## 2022-01-27 PROCEDURE — 87177 OVA AND PARASITES SMEARS: CPT | Mod: ,,, | Performed by: CLINICAL MEDICAL LABORATORY

## 2022-01-27 PROCEDURE — 82705 FATS/LIPIDS FECES QUAL: CPT | Mod: ,,, | Performed by: CLINICAL MEDICAL LABORATORY

## 2022-01-27 PROCEDURE — 87493 C DIFF AMPLIFIED PROBE: CPT | Mod: 59,,, | Performed by: CLINICAL MEDICAL LABORATORY

## 2022-01-27 PROCEDURE — 82270 OCCULT BLOOD FECES: CPT | Mod: ,,, | Performed by: CLINICAL MEDICAL LABORATORY

## 2022-01-28 ENCOUNTER — TELEPHONE (OUTPATIENT)
Dept: GASTROENTEROLOGY | Facility: CLINIC | Age: 77
End: 2022-01-28
Payer: MEDICARE

## 2022-01-28 DIAGNOSIS — A03.9 INFECTION, SHIGELLA: Primary | ICD-10-CM

## 2022-01-28 LAB — C DIFF TOX A+B STL IA-ACNC: NEGATIVE

## 2022-01-28 RX ORDER — AZITHROMYCIN 250 MG/1
TABLET, FILM COATED ORAL
Qty: 6 TABLET | Refills: 0 | Status: SHIPPED | OUTPATIENT
Start: 2022-01-28

## 2022-01-28 NOTE — TELEPHONE ENCOUNTER
Call to patient with results of positive shigella. Patient has been having estimate 12 stools per day. Due to allergies to Cipro, PCN, And Sulfa. Will send in Z rick. Advised to increase hydration. Verbalized understanding.

## 2022-02-01 LAB
O+P STL CONC: NORMAL
TRICHROME SMEAR: NORMAL

## 2022-02-07 ENCOUNTER — OFFICE VISIT (OUTPATIENT)
Dept: GASTROENTEROLOGY | Facility: CLINIC | Age: 77
End: 2022-02-07
Payer: MEDICARE

## 2022-02-07 VITALS
BODY MASS INDEX: 26.87 KG/M2 | OXYGEN SATURATION: 95 % | WEIGHT: 146 LBS | DIASTOLIC BLOOD PRESSURE: 64 MMHG | HEART RATE: 75 BPM | HEIGHT: 62 IN | SYSTOLIC BLOOD PRESSURE: 183 MMHG

## 2022-02-07 DIAGNOSIS — R21 RASH: ICD-10-CM

## 2022-02-07 DIAGNOSIS — K62.5 RECTAL BLEEDING: Chronic | ICD-10-CM

## 2022-02-07 DIAGNOSIS — K21.9 GASTROESOPHAGEAL REFLUX DISEASE, UNSPECIFIED WHETHER ESOPHAGITIS PRESENT: ICD-10-CM

## 2022-02-07 DIAGNOSIS — K51.919 ULCERATIVE COLITIS WITH COMPLICATION, UNSPECIFIED LOCATION: Primary | ICD-10-CM

## 2022-02-07 DIAGNOSIS — A03.9 SHIGELLA INFECTION: ICD-10-CM

## 2022-02-07 PROCEDURE — 99214 OFFICE O/P EST MOD 30 MIN: CPT | Mod: ,,, | Performed by: NURSE PRACTITIONER

## 2022-02-07 PROCEDURE — 99214 PR OFFICE/OUTPT VISIT, EST, LEVL IV, 30-39 MIN: ICD-10-PCS | Mod: ,,, | Performed by: NURSE PRACTITIONER

## 2022-02-07 NOTE — PROGRESS NOTES
Jose Russell is a 76 y.o. female here for Ulcerative Colitis        PCP: Johana Corbin  Referring Provider: No referring provider defined for this encounter.     HPI:  Presents for follow up after treatment for Shigella. Reports that after taking Zpak that she is feeling better. States that nocturnal stools have improved and that the total number of stools has decreased. Appetite has improved. States nausea is also better. Continue to have some rectal bleeding. Mucus has improved. She has a dry plaque and rash on her scalp and over her right eye. TB gold, Hepatitis B and C negative. HIV negative. Due to recent Shigella infection we will wait on starting biologic for another month. Also discussed increased risk of cancer such as lymphoma. Patient verbalized understanding of increased risks and side effects associated with biologic therapy. She has received IV iron twice through Alyssa Michele NP. She has also had recent lab and wishes for us to obtain results from her. Last colonoscopy 10/18/21 with active moderate colitis.         ROS:  Review of Systems   Constitutional: Negative for fatigue, fever and unexpected weight change. Appetite change: improved.   HENT: Negative for mouth sores and trouble swallowing.    Respiratory: Negative for shortness of breath and wheezing.    Cardiovascular: Negative for chest pain and palpitations.   Gastrointestinal: Positive for blood in stool and diarrhea. Negative for abdominal pain, anal bleeding, change in bowel habit, constipation, nausea (improved), rectal pain, vomiting, reflux, fecal incontinence and change in bowel habit.   Genitourinary: Negative for dysuria and frequency.   Musculoskeletal: Negative for back pain, gait problem and joint swelling.   Integumentary:  Positive for rash. Negative for pallor.   Neurological: Negative for dizziness, weakness and light-headedness.   Hematological: Does not bruise/bleed easily.   Psychiatric/Behavioral: The patient is  not nervous/anxious.           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    PSHX:  has a past surgical history that includes Colonoscopy (06/12/2020); Esophagogastroduodenoscopy (05/24/2018); Hysterectomy; Cholecystectomy; and Nephrectomy.    PFHX: family history is not on file.    PSlHX:  reports that she has never smoked. She has never used smokeless tobacco. She reports that she does not drink alcohol and does not use drugs.        Review of patient's allergies indicates:   Allergen Reactions    Levofloxacin in d5w     Mercaptopurine analogues (thiopurines)     Penicillins     Sulfa (sulfonamide antibiotics)        Medication List with Changes/Refills   Current Medications    ALLOPURINOL (ZYLOPRIM) 100 MG TABLET    Take 100 mg by mouth once daily.    ASPIRIN (ECOTRIN) 81 MG EC TABLET    Take 81 mg by mouth once daily.    ATENOLOL (TENORMIN) 25 MG TABLET    Take 25 mg by mouth once daily.    AZITHROMYCIN (ZITHROMAX Z-AKHIL) 250 MG TABLET    Take two tablets today and then one tablet daily for four days    BIFIDOBACTERIUM INFANTIS (ALIGN ORAL)    Take by mouth once daily.    CITALOPRAM (CELEXA) 20 MG TABLET    Take 20 mg by mouth once daily.    ESOMEPRAZOLE (NEXIUM) 40 MG CAPSULE    Take 40 mg by mouth once daily.    GLYBURIDE (DIABETA) 5 MG TABLET    Take 5 mg by mouth 2 (two) times daily with meals.    HYDRALAZINE (APRESOLINE) 50 MG TABLET    Take 50 mg by mouth every 12 (twelve) hours.    LEVOTHYROXINE (SYNTHROID) 50 MCG TABLET    Take 50 mcg by mouth before breakfast.    MESALAMINE (ASACOL HD) 800 MG TBEC    Take 2 tablets (1,600 mg total) by mouth 3 (three) times daily.    OMEGA-3 FATTY ACIDS/FISH OIL (FISH OIL-OMEGA-3 FATTY ACIDS) 300-1,000 MG CAPSULE     "Take by mouth once daily.    PRAVASTATIN (PRAVACHOL) 40 MG TABLET    Take 40 mg by mouth once daily.    SUCRALFATE (CARAFATE) 1 GRAM TABLET    Take 1 tablet (1 g total) by mouth 4 (four) times daily as needed (Epigastric pain).    TORSEMIDE (DEMADEX) 20 MG TAB    Take 10 mg by mouth once daily.    VERAPAMIL (VERELAN) 240 MG C24P    Take 240 mg by mouth once daily.        Objective Findings:  Vital Signs:  BP (!) 183/64   Pulse 75   Ht 5' 2" (1.575 m)   Wt 66.2 kg (146 lb)   SpO2 95%   BMI 26.70 kg/m²  Body mass index is 26.7 kg/m².    Physical Exam:  Physical Exam  Vitals and nursing note reviewed.   Constitutional:       General: She is not in acute distress.     Appearance: Normal appearance. She is not ill-appearing.   HENT:      Mouth/Throat:      Mouth: Mucous membranes are moist.   Eyes:      General: No scleral icterus.  Cardiovascular:      Rate and Rhythm: Normal rate and regular rhythm.   Pulmonary:      Breath sounds: No wheezing, rhonchi or rales.   Abdominal:      General: Bowel sounds are normal. There is no distension.      Palpations: Abdomen is soft. There is no mass.      Tenderness: There is no abdominal tenderness. There is no guarding or rebound.      Hernia: No hernia is present.   Musculoskeletal:      Right lower leg: No edema.      Left lower leg: No edema.   Skin:     General: Skin is warm and dry.      Coloration: Skin is not jaundiced or pale.      Findings: No bruising or rash.   Neurological:      Mental Status: She is alert and oriented to person, place, and time.   Psychiatric:         Mood and Affect: Mood normal.          Labs:  Lab Results   Component Value Date    WBC 7.22 12/13/2021    HGB 10.5 (L) 12/13/2021    HCT 34.4 (L) 12/13/2021    MCV 84.3 12/13/2021    RDW 16.4 (H) 12/13/2021     12/13/2021    LYMPH 16.9 (L) 12/13/2021    LYMPH 1.22 12/13/2021    MONO 7.1 (H) 12/13/2021    EOS 0.12 12/13/2021    BASO 0.03 12/13/2021     Lab Results   Component Value Date    "  12/13/2021    K 4.7 12/13/2021     12/13/2021    CO2 27 12/13/2021     (H) 12/13/2021    BUN 24 (H) 12/13/2021    CREATININE 1.15 (H) 12/13/2021    CALCIUM 9.9 12/13/2021    PROT 7.0 12/13/2021    ALBUMIN 3.4 (L) 12/13/2021    BILITOT 0.3 12/13/2021    ALKPHOS 120 12/13/2021    AST 34 12/13/2021    ALT 30 12/13/2021         Imaging: No results found.      Assessment:  Jose Russell is a 76 y.o. female here with:  1. Ulcerative colitis with complication, unspecified location    2. Shigella infection    3. Rectal bleeding    4. Rash    5. Gastroesophageal reflux disease, unspecified whether esophagitis present          Recommendations:  1. Will see next month, Plan to start Remicade  2. Keep appointment with BARBARA Michele for lab  3. Continue current Asacol  4. Miralax powder if constipation occurs  5. Refer to dermatology  6. Avoid spicy greasy foods. No NSAID's  7. Increase water intake to 64 ounces daily    Follow up in about 4 weeks (around 3/7/2022).      Order summary:  Orders Placed This Encounter    Ambulatory referral/consult to Dermatology       Thank you for allowing me to participate in the care of Jose Russell.      CHRIS Abdi

## 2022-03-08 ENCOUNTER — OFFICE VISIT (OUTPATIENT)
Dept: GASTROENTEROLOGY | Facility: CLINIC | Age: 77
End: 2022-03-08
Payer: MEDICARE

## 2022-03-08 VITALS
DIASTOLIC BLOOD PRESSURE: 54 MMHG | WEIGHT: 140 LBS | SYSTOLIC BLOOD PRESSURE: 117 MMHG | HEART RATE: 65 BPM | OXYGEN SATURATION: 96 % | BODY MASS INDEX: 25.76 KG/M2 | HEIGHT: 62 IN

## 2022-03-08 DIAGNOSIS — K51.919 ULCERATIVE COLITIS WITH COMPLICATION, UNSPECIFIED LOCATION: ICD-10-CM

## 2022-03-08 DIAGNOSIS — E61.1 IRON DEFICIENCY: ICD-10-CM

## 2022-03-08 DIAGNOSIS — K21.9 GASTROESOPHAGEAL REFLUX DISEASE, UNSPECIFIED WHETHER ESOPHAGITIS PRESENT: ICD-10-CM

## 2022-03-08 DIAGNOSIS — K62.5 RECTAL BLEEDING: Primary | Chronic | ICD-10-CM

## 2022-03-08 DIAGNOSIS — R11.2 NAUSEA AND VOMITING, INTRACTABILITY OF VOMITING NOT SPECIFIED, UNSPECIFIED VOMITING TYPE: ICD-10-CM

## 2022-03-08 DIAGNOSIS — Z11.59 SCREENING FOR VIRAL DISEASE: ICD-10-CM

## 2022-03-08 DIAGNOSIS — R63.4 WEIGHT LOSS: ICD-10-CM

## 2022-03-08 DIAGNOSIS — R19.7 DIARRHEA, UNSPECIFIED TYPE: ICD-10-CM

## 2022-03-08 PROCEDURE — 99214 OFFICE O/P EST MOD 30 MIN: CPT | Mod: ,,, | Performed by: NURSE PRACTITIONER

## 2022-03-08 PROCEDURE — 99214 PR OFFICE/OUTPT VISIT, EST, LEVL IV, 30-39 MIN: ICD-10-PCS | Mod: ,,, | Performed by: NURSE PRACTITIONER

## 2022-03-08 NOTE — PROGRESS NOTES
Jose Russell is a 76 y.o. female here for Follow-up        PCP: Johana Corbin  Referring Provider: No referring provider defined for this encounter.     HPI:  Presents for follow up UC. Patient brought a stool diary today. She is having 12-15 stools, with blood and mucus per day. On Asacol. In February patient did have a shigella infection and was treated. Weight is down 8 lbs since January 24. Reports continued decreased appetite. Does have nausea and vomiting. No dysphagia. CT abdomen and pelvis in Oct 2021. Colonoscopy 10/8/21 moderate active colitis. She has failed budesonide and Asacol. TB Gold, HIV, and Hepatitis negative in October. She is advised of the increased risks of lymphoma, cancers, and infections on Remicade. This has been discussed with the patient and daughter on multiple occasions and she agrees to treatment at this time. Chronically iron deficient and has received iron infusions at M. Michele's office. Lab from [facility] reviewed. Hgb 11.4 and Hct 35.6, which is improved. Albumin is 3.2. TSH 4.1.        ROS:  Review of Systems   Constitutional: Positive for appetite change (decreased) and fatigue. Negative for fever and unexpected weight change.   HENT: Negative for trouble swallowing.    Respiratory: Negative for shortness of breath and wheezing.    Cardiovascular: Negative for chest pain and palpitations.   Gastrointestinal: Positive for anal bleeding, blood in stool, diarrhea, nausea, rectal pain, vomiting and fecal incontinence. Negative for abdominal pain, change in bowel habit, constipation, reflux and change in bowel habit.   Genitourinary: Negative for dysuria and frequency.   Musculoskeletal: Negative for back pain, gait problem and joint swelling.   Integumentary:  Negative for pallor.   Neurological: Negative for dizziness, weakness and light-headedness.   Hematological: Does not bruise/bleed easily.   Psychiatric/Behavioral: The patient is not nervous/anxious.           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    PSHX:  has a past surgical history that includes Colonoscopy (06/12/2020); Esophagogastroduodenoscopy (05/24/2018); Hysterectomy; Cholecystectomy; and Nephrectomy.    PFHX: family history is not on file.    PSlHX:  reports that she has never smoked. She has never used smokeless tobacco. She reports that she does not drink alcohol and does not use drugs.        Review of patient's allergies indicates:   Allergen Reactions    Levofloxacin in d5w     Mercaptopurine analogues (thiopurines)     Penicillins     Sulfa (sulfonamide antibiotics)        Medication List with Changes/Refills   Current Medications    ALLOPURINOL (ZYLOPRIM) 100 MG TABLET    Take 200 mg by mouth once daily.    ASPIRIN (ECOTRIN) 81 MG EC TABLET    Take 81 mg by mouth once daily.    ATENOLOL (TENORMIN) 25 MG TABLET    Take 25 mg by mouth once daily.    AZITHROMYCIN (ZITHROMAX Z-AKHIL) 250 MG TABLET    Take two tablets today and then one tablet daily for four days    BIFIDOBACTERIUM INFANTIS (ALIGN ORAL)    Take by mouth once daily.    CITALOPRAM (CELEXA) 20 MG TABLET    Take 20 mg by mouth once daily.    ESOMEPRAZOLE (NEXIUM) 40 MG CAPSULE    Take 40 mg by mouth once daily.    GLYBURIDE (DIABETA) 5 MG TABLET    Take 5 mg by mouth 2 (two) times daily with meals.    HYDRALAZINE (APRESOLINE) 50 MG TABLET    Take 50 mg by mouth once daily.    LEVOTHYROXINE (SYNTHROID) 50 MCG TABLET    Take 50 mcg by mouth before breakfast.    MESALAMINE (ASACOL HD) 800 MG TBEC    Take 2 tablets (1,600 mg total) by mouth 3 (three) times daily.    OMEGA-3 FATTY ACIDS/FISH OIL (FISH OIL-OMEGA-3 FATTY ACIDS) 300-1,000 MG CAPSULE    Take by mouth once daily.    PRAVASTATIN (PRAVACHOL)  "40 MG TABLET    Take 40 mg by mouth once daily.    SUCRALFATE (CARAFATE) 1 GRAM TABLET    Take 1 tablet (1 g total) by mouth 4 (four) times daily as needed (Epigastric pain).    TORSEMIDE (DEMADEX) 20 MG TAB    Take 10 mg by mouth once daily.    VERAPAMIL (VERELAN) 240 MG C24P    Take 240 mg by mouth once daily.        Objective Findings:  Vital Signs:  BP (!) 117/54   Pulse 65   Ht 5' 2" (1.575 m)   Wt 63.5 kg (140 lb)   SpO2 96%   BMI 25.61 kg/m²  Body mass index is 25.61 kg/m².    Physical Exam:  Physical Exam  Vitals and nursing note reviewed.   Constitutional:       General: She is not in acute distress.     Appearance: Normal appearance.   HENT:      Mouth/Throat:      Mouth: Mucous membranes are moist.   Eyes:      General: No scleral icterus.  Cardiovascular:      Rate and Rhythm: Normal rate.   Pulmonary:      Breath sounds: No wheezing, rhonchi or rales.   Abdominal:      General: Bowel sounds are normal. There is no distension.      Palpations: Abdomen is soft. There is no mass.      Tenderness: There is no abdominal tenderness. There is no guarding or rebound.      Hernia: No hernia is present.   Musculoskeletal:      Right lower leg: No edema.      Left lower leg: No edema.   Skin:     General: Skin is warm and dry.      Coloration: Skin is pale. Skin is not jaundiced.      Findings: No bruising or rash.   Neurological:      Mental Status: She is alert and oriented to person, place, and time.   Psychiatric:         Mood and Affect: Mood normal.          Labs:  Lab Results   Component Value Date    WBC 7.22 12/13/2021    HGB 10.5 (L) 12/13/2021    HCT 34.4 (L) 12/13/2021    MCV 84.3 12/13/2021    RDW 16.4 (H) 12/13/2021     12/13/2021    LYMPH 16.9 (L) 12/13/2021    LYMPH 1.22 12/13/2021    MONO 7.1 (H) 12/13/2021    EOS 0.12 12/13/2021    BASO 0.03 12/13/2021     Lab Results   Component Value Date     12/13/2021    K 4.7 12/13/2021     12/13/2021    CO2 27 12/13/2021     " (H) 12/13/2021    BUN 24 (H) 12/13/2021    CREATININE 1.15 (H) 12/13/2021    CALCIUM 9.9 12/13/2021    PROT 7.0 12/13/2021    ALBUMIN 3.4 (L) 12/13/2021    BILITOT 0.3 12/13/2021    ALKPHOS 120 12/13/2021    AST 34 12/13/2021    ALT 30 12/13/2021         Imaging: No results found.      Assessment:  Jose Russell is a 76 y.o. female here with:  1. Rectal bleeding    2. Ulcerative colitis with complication, unspecified location    3. Gastroesophageal reflux disease, unspecified whether esophagitis present    4. Diarrhea, unspecified type    5. Weight loss    6. Iron deficiency    7. Nausea and vomiting, intractability of vomiting not specified, unspecified vomiting type    8. Screening for viral disease          Recommendations:  1. Cdiff and enteric pathogen stool study  2. Continue Asacol  3. Remicade 317 mg (5mg/kg) at 0, 2, 6 weeks then every 8 weeks. Written prescription sent to [facility]  4. No NSAID's  5. Increase water intake to prevent dehydration, 64 ounces daily  6. EGD for nausea and vomiting, weight loss  Follow up in about 4 weeks (around 4/5/2022).      Order summary:  Orders Placed This Encounter    Enteric Pathogen Panel    C Diff Toxin by PCR    POCT COVID-19 Rapid Screening    EGD       Thank you for allowing me to participate in the care of Jose Russell.      CHRIS Abdi

## 2022-03-10 ENCOUNTER — TELEPHONE (OUTPATIENT)
Dept: GASTROENTEROLOGY | Facility: CLINIC | Age: 77
End: 2022-03-10
Payer: MEDICARE

## 2022-03-10 RX ORDER — SODIUM CHLORIDE 0.9 % (FLUSH) 0.9 %
10 SYRINGE (ML) INJECTION
Status: CANCELLED | OUTPATIENT
Start: 2022-03-10

## 2022-03-10 NOTE — TELEPHONE ENCOUNTER
Called stool study results and recommendations. Instructed patient that stool could not be tested for C- Diff because stool was formed. Patient verbalized good understanding. Instructed patient that [facility] called stating that she would have $1000  copay each time she received Remicade infusion. Instructed patient that we will set her up with [facility] to start Remicade infusion. Instructed patient that [facility] will precert infusion and then call and scheduled. Patient verbalzied good understanding.      ----- Message from MELANIA Noel sent at 3/9/2022  1:43 PM CST -----  Enteric pathogen panel is negative. Cdiff cancelled due to formed stool. May proceed with Remicade when approved.

## 2022-03-21 ENCOUNTER — INFUSION (OUTPATIENT)
Dept: INFUSION THERAPY | Facility: HOSPITAL | Age: 77
End: 2022-03-21
Attending: NURSE PRACTITIONER
Payer: MEDICARE

## 2022-03-21 VITALS
SYSTOLIC BLOOD PRESSURE: 127 MMHG | TEMPERATURE: 98 F | WEIGHT: 140 LBS | HEART RATE: 63 BPM | DIASTOLIC BLOOD PRESSURE: 52 MMHG | BODY MASS INDEX: 25.6 KG/M2 | OXYGEN SATURATION: 94 % | RESPIRATION RATE: 18 BRPM

## 2022-03-21 DIAGNOSIS — K51.819 OTHER ULCERATIVE COLITIS WITH COMPLICATION: Primary | ICD-10-CM

## 2022-03-21 PROCEDURE — 96360 HYDRATION IV INFUSION INIT: CPT

## 2022-03-21 PROCEDURE — 96365 THER/PROPH/DIAG IV INF INIT: CPT

## 2022-03-21 PROCEDURE — 25000003 PHARM REV CODE 250: Performed by: NURSE PRACTITIONER

## 2022-03-21 RX ORDER — SODIUM CHLORIDE 0.9 % (FLUSH) 0.9 %
10 SYRINGE (ML) INJECTION
Status: DISCONTINUED | OUTPATIENT
Start: 2022-03-21 | End: 2022-03-21 | Stop reason: HOSPADM

## 2022-03-21 RX ORDER — SODIUM CHLORIDE 0.9 % (FLUSH) 0.9 %
10 SYRINGE (ML) INJECTION
Status: CANCELLED | OUTPATIENT
Start: 2022-03-21

## 2022-03-21 NOTE — PROGRESS NOTES
Started infusion @ 10/ml @ 1410. Increased infusion to 20ml/hr @ 1425  Increased infusion to 40ml/hr @ 1440  Increased infusion to 80ml/hr @ 1455  incresed infusion to 150ml/hr @ 1510  Increased infusion to 250ml/hr @ 1525.   Infusion completed at 1610. Pt tolerated. Return appointment given. Instructed pt to return to er with any signs of allergic reaction

## 2022-04-01 ENCOUNTER — ANESTHESIA EVENT (OUTPATIENT)
Dept: GASTROENTEROLOGY | Facility: HOSPITAL | Age: 77
End: 2022-04-01
Payer: MEDICARE

## 2022-04-01 ENCOUNTER — HOSPITAL ENCOUNTER (OUTPATIENT)
Dept: GASTROENTEROLOGY | Facility: HOSPITAL | Age: 77
Discharge: HOME | End: 2022-04-01
Attending: NURSE PRACTITIONER
Payer: MEDICARE

## 2022-04-01 ENCOUNTER — ANESTHESIA (OUTPATIENT)
Dept: GASTROENTEROLOGY | Facility: HOSPITAL | Age: 77
End: 2022-04-01
Payer: MEDICARE

## 2022-04-01 VITALS
SYSTOLIC BLOOD PRESSURE: 152 MMHG | DIASTOLIC BLOOD PRESSURE: 68 MMHG | HEART RATE: 69 BPM | OXYGEN SATURATION: 99 % | TEMPERATURE: 99 F | RESPIRATION RATE: 16 BRPM | BODY MASS INDEX: 26.16 KG/M2 | WEIGHT: 143 LBS

## 2022-04-01 DIAGNOSIS — K21.9 GASTROESOPHAGEAL REFLUX DISEASE, UNSPECIFIED WHETHER ESOPHAGITIS PRESENT: ICD-10-CM

## 2022-04-01 DIAGNOSIS — R11.2 NAUSEA AND VOMITING, INTRACTABILITY OF VOMITING NOT SPECIFIED, UNSPECIFIED VOMITING TYPE: ICD-10-CM

## 2022-04-01 DIAGNOSIS — R63.4 WEIGHT LOSS: ICD-10-CM

## 2022-04-01 DIAGNOSIS — D50.0 IRON DEFICIENCY ANEMIA DUE TO CHRONIC BLOOD LOSS: ICD-10-CM

## 2022-04-01 LAB — GLUCOSE SERPL-MCNC: 96 MG/DL (ref 70–110)

## 2022-04-01 PROCEDURE — D9220A PRA ANESTHESIA: ICD-10-PCS | Mod: ,,, | Performed by: NURSE ANESTHETIST, CERTIFIED REGISTERED

## 2022-04-01 PROCEDURE — 27000284 HC CANNULA NASAL: Performed by: NURSE ANESTHETIST, CERTIFIED REGISTERED

## 2022-04-01 PROCEDURE — 37000009 HC ANESTHESIA EA ADD 15 MINS

## 2022-04-01 PROCEDURE — D9220A PRA ANESTHESIA: Mod: ,,, | Performed by: NURSE ANESTHETIST, CERTIFIED REGISTERED

## 2022-04-01 PROCEDURE — 88305 TISSUE EXAM BY PATHOLOGIST: CPT | Mod: SUR,59 | Performed by: INTERNAL MEDICINE

## 2022-04-01 PROCEDURE — 25000003 PHARM REV CODE 250: Performed by: NURSE ANESTHETIST, CERTIFIED REGISTERED

## 2022-04-01 PROCEDURE — 37000008 HC ANESTHESIA 1ST 15 MINUTES

## 2022-04-01 PROCEDURE — 88305 SURGICAL PATHOLOGY: ICD-10-PCS | Mod: 26,,, | Performed by: PATHOLOGY

## 2022-04-01 PROCEDURE — 82962 GLUCOSE BLOOD TEST: CPT

## 2022-04-01 PROCEDURE — 88342 SURGICAL PATHOLOGY: ICD-10-PCS | Mod: 26,,, | Performed by: PATHOLOGY

## 2022-04-01 PROCEDURE — 43239 EGD BIOPSY SINGLE/MULTIPLE: CPT

## 2022-04-01 PROCEDURE — 63600175 PHARM REV CODE 636 W HCPCS: Performed by: INTERNAL MEDICINE

## 2022-04-01 PROCEDURE — 27201423 OPTIME MED/SURG SUP & DEVICES STERILE SUPPLY

## 2022-04-01 PROCEDURE — C1889 IMPLANT/INSERT DEVICE, NOC: HCPCS

## 2022-04-01 PROCEDURE — 88305 TISSUE EXAM BY PATHOLOGIST: CPT | Mod: 26,XU,, | Performed by: PATHOLOGY

## 2022-04-01 PROCEDURE — 63600175 PHARM REV CODE 636 W HCPCS: Performed by: NURSE ANESTHETIST, CERTIFIED REGISTERED

## 2022-04-01 PROCEDURE — 88342 IMHCHEM/IMCYTCHM 1ST ANTB: CPT | Mod: 26,,, | Performed by: PATHOLOGY

## 2022-04-01 RX ORDER — SUCRALFATE 1 G/1
1 TABLET ORAL 4 TIMES DAILY PRN
Qty: 100 TABLET | Refills: 1 | Status: SHIPPED | OUTPATIENT
Start: 2022-04-01

## 2022-04-01 RX ORDER — LIDOCAINE HYDROCHLORIDE 20 MG/ML
INJECTION INTRAVENOUS
Status: DISCONTINUED | OUTPATIENT
Start: 2022-04-01 | End: 2022-04-01

## 2022-04-01 RX ORDER — ONDANSETRON 2 MG/ML
4 INJECTION INTRAMUSCULAR; INTRAVENOUS ONCE
Status: COMPLETED | OUTPATIENT
Start: 2022-04-01 | End: 2022-04-01

## 2022-04-01 RX ORDER — SODIUM CHLORIDE 0.9 % (FLUSH) 0.9 %
10 SYRINGE (ML) INJECTION
Status: DISCONTINUED | OUTPATIENT
Start: 2022-04-01 | End: 2022-04-02 | Stop reason: HOSPADM

## 2022-04-01 RX ORDER — PROPOFOL 10 MG/ML
VIAL (ML) INTRAVENOUS
Status: DISCONTINUED | OUTPATIENT
Start: 2022-04-01 | End: 2022-04-01

## 2022-04-01 RX ADMIN — ONDANSETRON 4 MG: 2 INJECTION INTRAMUSCULAR; INTRAVENOUS at 08:04

## 2022-04-01 RX ADMIN — LIDOCAINE HYDROCHLORIDE 100 MG: 20 INJECTION, SOLUTION INTRAVENOUS at 08:04

## 2022-04-01 RX ADMIN — PROPOFOL 100 MG: 10 INJECTION, EMULSION INTRAVENOUS at 08:04

## 2022-04-01 NOTE — TRANSFER OF CARE
Anesthesia Transfer of Care Note    Patient: Jose Russell    Procedure(s) Performed: * No procedures listed *    Patient location: GI    Anesthesia Type: general    Transport from OR: Transported from OR on room air with adequate spontaneous ventilation    Post pain: adequate analgesia    Post assessment: no apparent anesthetic complications    Post vital signs: stable    Level of consciousness: responds to stimulation    Nausea/Vomiting: no nausea/vomiting    Complications: none    Transfer of care protocol was followed      Last vitals:   Visit Vitals  BP (!) 141/60 (BP Location: Right arm, Patient Position: Lying)   Pulse 66   Temp 37 °C (98.6 °F) (Oral)   Resp 18   Wt 64.9 kg (143 lb)   SpO2 99%   Breastfeeding No   BMI 26.16 kg/m²

## 2022-04-01 NOTE — ANESTHESIA POSTPROCEDURE EVALUATION
Anesthesia Post Evaluation    Patient: Jose Russell    Procedure(s) Performed: * No procedures listed *    Final Anesthesia Type: general      Patient location during evaluation: GI PACU  Patient participation: Yes- Able to Participate  Level of consciousness: responds to stimulation  Post-procedure vital signs: reviewed and stable  Pain management: adequate  Airway patency: patent  SHANA mitigation strategies: Multimodal analgesia  PONV status at discharge: No PONV  Anesthetic complications: no      Cardiovascular status: blood pressure returned to baseline  Respiratory status: unassisted and spontaneous ventilation  Hydration status: euvolemic  Follow-up not needed.          Vitals Value Taken Time   /60 04/01/22 0812   Temp 37 °C (98.6 °F) 04/01/22 0812   Pulse 66 04/01/22 0812   Resp 18 04/01/22 0812   SpO2 99 % 04/01/22 0812         No case tracking events are documented in the log.      Pain/Sri Score: No data recorded

## 2022-04-01 NOTE — H&P
[facility]  Gastroenterology  H&P    Patient Name: Jose Russell  MRN: [id number]  Admission Date: 4/1/2022  Code Status: Full Code    Attending Provider: Adi Salcido MD  Primary Care Physician: MELANIA Abdi  Principal Problem:<principal problem not specified>    Subjective:     History of Present Illness: Pt c/o burning dyspepsia; for egd.    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       Past Surgical History:   Procedure Laterality Date    CHOLECYSTECTOMY      COLONOSCOPY  06/12/2020    repeat in 3 years    ESOPHAGOGASTRODUODENOSCOPY  05/24/2018    HYSTERECTOMY      NEPHRECTOMY         Review of patient's allergies indicates:   Allergen Reactions    Levofloxacin in d5w     Mercaptopurine analogues (thiopurines)     Penicillins     Sulfa (sulfonamide antibiotics)      Family History    None       Tobacco Use    Smoking status: Never Smoker    Smokeless tobacco: Never Used   Substance and Sexual Activity    Alcohol use: Never    Drug use: Never    Sexual activity: Not on file     Review of Systems   Respiratory: Negative.    Cardiovascular: Negative.    Gastrointestinal: Positive for abdominal pain and diarrhea.     Objective:     Vital Signs (Most Recent):  Temp: 97.4 °F (36.3 °C) (04/01/22 0736)  Pulse: 67 (04/01/22 0736)  Resp: 16 (04/01/22 0736)  BP: (!) 145/53 (04/01/22 0736)  SpO2: 95 % (04/01/22 0736) Vital Signs (24h Range):  Temp:  [97.4 °F (36.3 °C)] 97.4 °F (36.3 °C)  Pulse:  [67] 67  Resp:  [16] 16  SpO2:  [95 %] 95 %  BP: (145)/(53) 145/53     Weight: 64.9 kg (143 lb) (04/01/22 0736)  Body mass index is 26.16 kg/m².    No intake or output data in the 24 hours  ending 04/01/22 0803    Lines/Drains/Airways     Peripheral Intravenous Line  Duration                Peripheral IV - Single Lumen 04/01/22 0735 22 G Posterior;Right Hand <1 day                Physical Exam  Vitals reviewed.   Constitutional:       General: She is not in acute distress.     Appearance: Normal appearance. She is well-developed. She is not ill-appearing.   HENT:      Head: Normocephalic and atraumatic.      Nose: Nose normal.   Eyes:      Pupils: Pupils are equal, round, and reactive to light.   Cardiovascular:      Rate and Rhythm: Normal rate and regular rhythm.   Pulmonary:      Effort: Pulmonary effort is normal.      Breath sounds: Normal breath sounds. No wheezing.   Abdominal:      General: Abdomen is flat. Bowel sounds are normal. There is no distension.      Palpations: Abdomen is soft.      Tenderness: There is no abdominal tenderness. There is no guarding.   Skin:     General: Skin is warm and dry.      Coloration: Skin is not jaundiced.   Neurological:      Mental Status: She is alert.   Psychiatric:         Attention and Perception: Attention normal.         Mood and Affect: Affect normal.         Speech: Speech normal.         Behavior: Behavior is cooperative.      Comments: Pt was calm while speaking.         Significant Labs:  CBC: No results for input(s): WBC, HGB, HCT, PLT in the last 48 hours.  CMP: No results for input(s): GLU, CALCIUM, ALBUMIN, PROT, NA, K, CO2, CL, BUN, CREATININE, ALKPHOS, ALT, AST, BILITOT in the last 48 hours.    Significant Imaging:  Imaging results within the past 24 hours have been reviewed.    Assessment/Plan:     There are no hospital problems to display for this patient.        Imp: dyspepsia, iron def anemia; UC  Plan: egd    Adi Salcido MD  Gastroenterology  [facility]

## 2022-04-01 NOTE — DISCHARGE INSTRUCTIONS
Procedure Date  4/1/22     Impression  Overall Impression: Mucosa of the esophagus is normal. The stomach has mild erythema without ulcers, biopsies were obtained. Mucosa of the duodenum is normal, biopsies were obtained due to IBD.     Recommendation  Await pathology results; avoid nsaids; pt may use ppi bid x 1 month and add carafate up to qid.    THE NURSE WILL CALL YOU WITH YOUR BIOPSY RESULTS IN A FEW DAYS.     NO DRIVING, OPERATING EQUIPMENT, OR SIGNING LEGAL DOCUMENTS FOR 24 HOURS.

## 2022-04-01 NOTE — ANESTHESIA PREPROCEDURE EVALUATION
04/01/2022  Jose Russell is a 76 y.o., female.      Pre-op Assessment    I have reviewed the Patient Summary Reports.     I have reviewed the Nursing Notes. I have reviewed the NPO Status.   I have reviewed the Medications.     Review of Systems  Cardiovascular:   Hypertension    Pulmonary:   Asthma    Renal/:   Chronic Renal Disease, CRI    Hepatic/GI:   PUD, Hiatal Hernia, GERD, poorly controlled    Endocrine:   Diabetes, type 2        Physical Exam  General: Well nourished, Cooperative, Alert and Oriented    Airway:  Mallampati: II   Mouth Opening: Normal  TM Distance: Normal  Tongue: Normal  Neck ROM: Normal ROM        Anesthesia Plan  Type of Anesthesia, risks & benefits discussed:    Anesthesia Type: Gen Natural Airway  Intra-op Monitoring Plan: Standard ASA Monitors  Post Op Pain Control Plan: multimodal analgesia  Induction:  IV  Airway Plan: , Awake  Informed Consent: Informed consent signed with the Patient and all parties understand the risks and agree with anesthesia plan.  All questions answered. Patient consented to blood products? Yes  ASA Score: 3  Day of Surgery Review of History & Physical: H&P Update referred to the surgeon/provider.    Ready For Surgery From Anesthesia Perspective.     .

## 2022-04-01 NOTE — ANESTHESIA RELEASE NOTE
Anesthesia Release from PACU Note    Patient: Jose Russell    Procedure(s) Performed: * No procedures listed *    Anesthesia type: general    Post pain: Adequate analgesia    Post assessment: no apparent anesthetic complications    Last Vitals:   Visit Vitals  BP (!) 141/60 (BP Location: Right arm, Patient Position: Lying)   Pulse 66   Temp 37 °C (98.6 °F) (Oral)   Resp 18   Wt 64.9 kg (143 lb)   SpO2 99%   Breastfeeding No   BMI 26.16 kg/m²       Post vital signs: stable    Level of consciousness: awake    Nausea/Vomiting: no nausea/no vomiting    Complications: none    Airway Patency: patent    Respiratory: unassisted    Cardiovascular: stable and blood pressure at baseline    Hydration: euvolemic

## 2022-04-04 LAB
DHEA SERPL-MCNC: NORMAL
ESTROGEN SERPL-MCNC: NORMAL PG/ML
INSULIN SERPL-ACNC: NORMAL U[IU]/ML
LAB AP GROSS DESCRIPTION: NORMAL
LAB AP LABORATORY NOTES: NORMAL
T3RU NFR SERPL: NORMAL %

## 2022-04-05 ENCOUNTER — OFFICE VISIT (OUTPATIENT)
Dept: GASTROENTEROLOGY | Facility: CLINIC | Age: 77
End: 2022-04-05
Payer: MEDICARE

## 2022-04-05 VITALS
SYSTOLIC BLOOD PRESSURE: 117 MMHG | WEIGHT: 143 LBS | HEART RATE: 68 BPM | OXYGEN SATURATION: 96 % | BODY MASS INDEX: 26.31 KG/M2 | HEIGHT: 62 IN | DIASTOLIC BLOOD PRESSURE: 47 MMHG

## 2022-04-05 DIAGNOSIS — K51.819 OTHER ULCERATIVE COLITIS WITH COMPLICATION: Primary | ICD-10-CM

## 2022-04-05 DIAGNOSIS — K51.90 ULCERATIVE COLITIS WITHOUT COMPLICATIONS, UNSPECIFIED LOCATION: ICD-10-CM

## 2022-04-05 DIAGNOSIS — E61.1 IRON DEFICIENCY: ICD-10-CM

## 2022-04-05 DIAGNOSIS — R19.7 DIARRHEA, UNSPECIFIED TYPE: ICD-10-CM

## 2022-04-05 PROCEDURE — 99213 OFFICE O/P EST LOW 20 MIN: CPT | Mod: ,,, | Performed by: NURSE PRACTITIONER

## 2022-04-05 PROCEDURE — 99213 PR OFFICE/OUTPT VISIT, EST, LEVL III, 20-29 MIN: ICD-10-PCS | Mod: ,,, | Performed by: NURSE PRACTITIONER

## 2022-04-05 RX ORDER — MESALAMINE 800 MG/1
1600 TABLET, DELAYED RELEASE ORAL 3 TIMES DAILY
Qty: 540 TABLET | Refills: 3 | Status: SHIPPED | OUTPATIENT
Start: 2022-04-05 | End: 2022-08-16 | Stop reason: SDUPTHER

## 2022-04-05 RX ORDER — MESALAMINE 800 MG/1
1600 TABLET, DELAYED RELEASE ORAL 3 TIMES DAILY
Qty: 540 TABLET | Refills: 3 | Status: SHIPPED | OUTPATIENT
Start: 2022-04-05 | End: 2022-04-05 | Stop reason: CLARIF

## 2022-04-05 NOTE — PROGRESS NOTES
Jose Russell is a 76 y.o. female here for Follow-up        PCP: Alyssa Michele  Referring Provider: No referring provider defined for this encounter.     HPI:  Presents for follow up of UC. She has had one Remicade infusion. Reports that bowel movements have decreased to an average of 5-6 per day which is an improvement. Does have some rectal bleeding. Intermittent abdominal cramping. Appetite is improved. No more weight loss. No sign of rash, fever, or joint swelling. States she believes she feels better. She is due this week for a second infusion. Will continue Asacol at this time.        ROS:  Review of Systems   Constitutional: Positive for appetite change (improved) and fatigue. Negative for fever and unexpected weight change.   HENT: Negative for trouble swallowing.    Respiratory: Negative for shortness of breath and wheezing.    Cardiovascular: Negative for chest pain and palpitations.   Gastrointestinal: Positive for blood in stool and diarrhea (decreased to 5 stools on average). Negative for abdominal pain, change in bowel habit, constipation, nausea, vomiting, reflux, fecal incontinence and change in bowel habit.   Genitourinary: Negative for dysuria.   Musculoskeletal: Negative for back pain and gait problem.   Integumentary:  Positive for pallor.   Neurological: Negative for dizziness and light-headedness.   Hematological: Does not bruise/bleed easily.   Psychiatric/Behavioral: The patient is not nervous/anxious.           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    PSHX:  has a past surgical history that includes Colonoscopy (06/12/2020);  Esophagogastroduodenoscopy (05/24/2018); Hysterectomy; Cholecystectomy; and Nephrectomy.    PFHX: family history is not on file.    PSlHX:  reports that she has never smoked. She has never used smokeless tobacco. She reports that she does not drink alcohol and does not use drugs.        Review of patient's allergies indicates:   Allergen Reactions    Levofloxacin in d5w     Mercaptopurine analogues (thiopurines)     Penicillins     Sulfa (sulfonamide antibiotics)        Medication List with Changes/Refills   Current Medications    ALLOPURINOL (ZYLOPRIM) 100 MG TABLET    Take 200 mg by mouth once daily.    ASPIRIN (ECOTRIN) 81 MG EC TABLET    Take 81 mg by mouth once daily.    ATENOLOL (TENORMIN) 25 MG TABLET    Take 25 mg by mouth once daily.    AZITHROMYCIN (ZITHROMAX Z-AKHIL) 250 MG TABLET    Take two tablets today and then one tablet daily for four days    BIFIDOBACTERIUM INFANTIS (ALIGN ORAL)    Take by mouth once daily.    CITALOPRAM (CELEXA) 20 MG TABLET    Take 20 mg by mouth once daily.    ESOMEPRAZOLE (NEXIUM) 40 MG CAPSULE    Take 40 mg by mouth once daily.    GLYBURIDE (DIABETA) 5 MG TABLET    Take 5 mg by mouth 2 (two) times daily with meals.    HYDRALAZINE (APRESOLINE) 50 MG TABLET    Take 50 mg by mouth once daily.    LEVOTHYROXINE (SYNTHROID) 50 MCG TABLET    Take 50 mcg by mouth before breakfast.    OMEGA-3 FATTY ACIDS/FISH OIL (FISH OIL-OMEGA-3 FATTY ACIDS) 300-1,000 MG CAPSULE    Take by mouth once daily.    PRAVASTATIN (PRAVACHOL) 40 MG TABLET    Take 40 mg by mouth once daily.    SUCRALFATE (CARAFATE) 1 GRAM TABLET    Take 1 tablet (1 g total) by mouth 4 (four) times daily as needed (Epigastric pain).    TORSEMIDE (DEMADEX) 20 MG TAB    Take 10 mg by mouth once daily.    VERAPAMIL (VERELAN) 240 MG C24P    Take 240 mg by mouth once daily.   Changed and/or Refilled Medications    Modified Medication Previous Medication    MESALAMINE (ASACOL HD) 800 MG TBEC mesalamine (ASACOL HD) 800 mg TbEC        "Take 2 tablets (1,600 mg total) by mouth 3 (three) times daily.    Take 2 tablets (1,600 mg total) by mouth 3 (three) times daily.        Objective Findings:  Vital Signs:  BP (!) 117/47   Pulse 68   Ht 5' 2" (1.575 m)   Wt 64.9 kg (143 lb)   SpO2 96%   BMI 26.16 kg/m²  Body mass index is 26.16 kg/m².    Physical Exam:  Physical Exam  Vitals and nursing note reviewed.   Constitutional:       General: She is not in acute distress.     Appearance: Normal appearance. She is not ill-appearing.   HENT:      Mouth/Throat:      Mouth: Mucous membranes are moist.   Eyes:      General: No scleral icterus.  Cardiovascular:      Rate and Rhythm: Normal rate and regular rhythm.      Heart sounds: No murmur heard.  Pulmonary:      Breath sounds: No wheezing, rhonchi or rales.   Abdominal:      General: Bowel sounds are normal. There is no distension.      Palpations: Abdomen is soft. There is no mass.      Tenderness: There is no abdominal tenderness. There is no guarding or rebound.      Hernia: No hernia is present.   Musculoskeletal:      Right lower leg: No edema.      Left lower leg: No edema.   Skin:     General: Skin is warm and dry.      Coloration: Skin is pale. Skin is not jaundiced.      Findings: No bruising or rash.   Neurological:      Mental Status: She is alert and oriented to person, place, and time.   Psychiatric:         Mood and Affect: Mood normal.          Labs:  Lab Results   Component Value Date    WBC 7.22 12/13/2021    HGB 10.5 (L) 12/13/2021    HCT 34.4 (L) 12/13/2021    MCV 84.3 12/13/2021    RDW 16.4 (H) 12/13/2021     12/13/2021    LYMPH 16.9 (L) 12/13/2021    LYMPH 1.22 12/13/2021    MONO 7.1 (H) 12/13/2021    EOS 0.12 12/13/2021    BASO 0.03 12/13/2021     Lab Results   Component Value Date     12/13/2021    K 4.7 12/13/2021     12/13/2021    CO2 27 12/13/2021     (H) 12/13/2021    BUN 24 (H) 12/13/2021    CREATININE 1.15 (H) 12/13/2021    CALCIUM 9.9 12/13/2021    " PROT 7.0 12/13/2021    ALBUMIN 3.4 (L) 12/13/2021    BILITOT 0.3 12/13/2021    ALKPHOS 120 12/13/2021    AST 34 12/13/2021    ALT 30 12/13/2021         Imaging: EGD    Result Date: 4/1/2022  Procedure Date 4/1/22 Impression Overall      Mucosa of the esophagus is normal. The stomach has mild erythema without ulcers, biopsies were obtained. Mucosa of the duodenum is normal, biopsies were obtained due to IBD. Recommendation Await pathology results; avoid nsaids; pt may use ppi bid x 1 month and add carafate up to qid. Indication Weight loss, Nausea and vomiting, intractability of vomiting not specified, unspecified vomiting type Providers Attending: Adi Salcido MD Fellow:  Other Staff: Darline Genao RN, Anastacio Chow RN, Robb Serrano, CRNA Medications Moderate sedation administered by anesthesia staff - See anesthesia record. Preprocedure A history and physical has been performed, and patient medication allergies have been reviewed. The patient's tolerance of previous anesthesia has been reviewed. The risks and benefits of the procedure and the sedation options and risks were discussed with the patient. All questions were answered and informed consent obtained. ASA Score: ASA 2 - Patient with mild systemic disease with no functional limitations Mallampati Airway Score: II (hard and soft palate, upper portion of tonsils anduvula visible) Details of the Procedure The patient underwent monitored anesthesia care, which was administered by an anesthesia professional. The patient's blood pressure, heart rate, level of consciousness, oxygen, respirations, ECG and ETCO2 were monitored throughout the procedure. The scope was introduced through the mouth and advanced to the third part of the duodenum. Retroflexion was performed in the cardia. Prior to the procedure, the patient's H. Pylori status was unknown. The patient's estimated blood loss was minimal (<5 mL). The procedure was not difficult. The  patient tolerated the procedure well. There were no apparent complications. Scope: Gastroscope Scope Serial: [id number] Events Procedure Events Event Event Time Procedure Events Event Event Time SCOPE IN 4/1/2022  8:07 AM SCOPE OUT 4/1/2022  8:12 AM Findings Erythematous mucosa in the fundus of the stomach and antrum; performed cold forceps biopsy         Assessment:  Jose Russell is a 76 y.o. female here with:  1. Other ulcerative colitis with complication    2. Iron deficiency    3. Diarrhea, unspecified type    4. Ulcerative colitis without complications, unspecified location          Recommendations:  1. Keep appointment for 2nd infusion on Thursday  2. Continue Asacol as directed  3. Avoid NSAID's  4. Water intake of 64 ounces daily  5. Call back sooner if needed  6. CBC, CMP, CRP, and iron studies in 6 weeks    Follow up in about 6 weeks (around 5/17/2022).      Order summary:  Orders Placed This Encounter    CBC Auto Differential    Comprehensive Metabolic Panel    C-Reactive Protein    Iron and TIBC    Ferritin    mesalamine (ASACOL HD) 800 mg TbEC       Thank you for allowing me to participate in the care of Jose Russell.      CHRIS Abdi

## 2022-04-07 ENCOUNTER — INFUSION (OUTPATIENT)
Dept: INFUSION THERAPY | Facility: HOSPITAL | Age: 77
End: 2022-04-07
Attending: INTERNAL MEDICINE
Payer: MEDICARE

## 2022-04-07 VITALS
HEIGHT: 62 IN | WEIGHT: 143 LBS | SYSTOLIC BLOOD PRESSURE: 120 MMHG | RESPIRATION RATE: 18 BRPM | OXYGEN SATURATION: 97 % | DIASTOLIC BLOOD PRESSURE: 57 MMHG | TEMPERATURE: 98 F | BODY MASS INDEX: 26.31 KG/M2 | HEART RATE: 66 BPM

## 2022-04-07 DIAGNOSIS — K51.819 OTHER ULCERATIVE COLITIS WITH COMPLICATION: Primary | ICD-10-CM

## 2022-04-07 PROCEDURE — 96365 THER/PROPH/DIAG IV INF INIT: CPT

## 2022-04-07 PROCEDURE — 25000003 PHARM REV CODE 250

## 2022-04-07 PROCEDURE — 25000003 PHARM REV CODE 250: Performed by: NURSE PRACTITIONER

## 2022-04-07 PROCEDURE — 63600175 PHARM REV CODE 636 W HCPCS

## 2022-04-07 RX ORDER — SODIUM CHLORIDE 0.9 % (FLUSH) 0.9 %
10 SYRINGE (ML) INJECTION
Status: DISCONTINUED | OUTPATIENT
Start: 2022-04-07 | End: 2022-04-07 | Stop reason: HOSPADM

## 2022-04-07 RX ORDER — SODIUM CHLORIDE 0.9 % (FLUSH) 0.9 %
10 SYRINGE (ML) INJECTION
Status: CANCELLED | OUTPATIENT
Start: 2022-04-07

## 2022-04-07 NOTE — PROGRESS NOTES
Pt here for remicade infusion. vss obtained. Infused pt per md order. Started at 20ml and increased every 15mins until goal of 250ml/hr was reached. Pt tolerated infusion. No signs or symptoms noted. D/c'ed pt home.

## 2022-04-09 ENCOUNTER — HOSPITAL ENCOUNTER (EMERGENCY)
Facility: HOSPITAL | Age: 77
Discharge: HOME | End: 2022-04-09
Attending: EMERGENCY MEDICINE
Payer: MEDICARE

## 2022-04-09 VITALS
HEIGHT: 62 IN | WEIGHT: 143 LBS | SYSTOLIC BLOOD PRESSURE: 123 MMHG | DIASTOLIC BLOOD PRESSURE: 48 MMHG | BODY MASS INDEX: 26.31 KG/M2 | OXYGEN SATURATION: 94 % | HEART RATE: 89 BPM | RESPIRATION RATE: 19 BRPM | TEMPERATURE: 99 F

## 2022-04-09 DIAGNOSIS — Z11.59 SCREENING FOR VIRAL DISEASE: ICD-10-CM

## 2022-04-09 DIAGNOSIS — K57.30 DIVERTICULA, COLON: ICD-10-CM

## 2022-04-09 DIAGNOSIS — R93.89 ABNORMAL CT SCAN: ICD-10-CM

## 2022-04-09 DIAGNOSIS — R10.13 EPIGASTRIC PAIN: ICD-10-CM

## 2022-04-09 DIAGNOSIS — K44.9 HH (HIATUS HERNIA): ICD-10-CM

## 2022-04-09 DIAGNOSIS — R63.4 WEIGHT LOSS: ICD-10-CM

## 2022-04-09 DIAGNOSIS — R11.2 NAUSEA AND VOMITING: ICD-10-CM

## 2022-04-09 DIAGNOSIS — Z79.899 HIGH RISK MEDICATION USE: ICD-10-CM

## 2022-04-09 DIAGNOSIS — A03.9 SHIGELLA INFECTION: ICD-10-CM

## 2022-04-09 DIAGNOSIS — K51.20 ULCERATIVE PROCTITIS: ICD-10-CM

## 2022-04-09 DIAGNOSIS — R21 RASH: ICD-10-CM

## 2022-04-09 DIAGNOSIS — R19.7 DIARRHEA, UNSPECIFIED TYPE: ICD-10-CM

## 2022-04-09 DIAGNOSIS — K51.919 ULCERATIVE COLITIS WITH COMPLICATION: ICD-10-CM

## 2022-04-09 DIAGNOSIS — R50.9 FEVER: ICD-10-CM

## 2022-04-09 DIAGNOSIS — K62.89 RECTAL PAIN: ICD-10-CM

## 2022-04-09 DIAGNOSIS — R68.83 CHILLS: Primary | ICD-10-CM

## 2022-04-09 DIAGNOSIS — K29.00 ACUTE SUPERFICIAL GASTRITIS WITHOUT HEMORRHAGE: ICD-10-CM

## 2022-04-09 DIAGNOSIS — R19.7 DIARRHEA: ICD-10-CM

## 2022-04-09 DIAGNOSIS — D50.0 IRON DEFICIENCY ANEMIA DUE TO CHRONIC BLOOD LOSS: ICD-10-CM

## 2022-04-09 DIAGNOSIS — K21.9 GASTROESOPHAGEAL REFLUX DISEASE: ICD-10-CM

## 2022-04-09 DIAGNOSIS — K59.00 CONSTIPATION: ICD-10-CM

## 2022-04-09 DIAGNOSIS — K62.5 RECTAL BLEEDING: Chronic | ICD-10-CM

## 2022-04-09 DIAGNOSIS — E61.1 IRON DEFICIENCY: ICD-10-CM

## 2022-04-09 LAB
ALBUMIN SERPL BCP-MCNC: 3.8 G/DL (ref 3.5–5)
ALBUMIN/GLOB SERPL: 1.4 {RATIO}
ALP SERPL-CCNC: 134 U/L (ref 55–142)
ALT SERPL W P-5'-P-CCNC: 46 U/L (ref 13–56)
ANION GAP SERPL CALCULATED.3IONS-SCNC: 18 MMOL/L (ref 7–16)
AST SERPL W P-5'-P-CCNC: 46 U/L (ref 15–37)
BASOPHILS # BLD AUTO: 0.02 K/UL (ref 0–0.2)
BASOPHILS NFR BLD AUTO: 0.2 % (ref 0–1)
BILIRUB SERPL-MCNC: 0.4 MG/DL (ref 0–1.2)
BILIRUB UR QL STRIP: NEGATIVE
BUN SERPL-MCNC: 28 MG/DL (ref 7–18)
BUN/CREAT SERPL: 22 (ref 6–20)
CALCIUM SERPL-MCNC: 9.7 MG/DL (ref 8.5–10.1)
CHLORIDE SERPL-SCNC: 103 MMOL/L (ref 98–107)
CLARITY UR: CLEAR
CO2 SERPL-SCNC: 21 MMOL/L (ref 21–32)
COLOR UR: YELLOW
CREAT SERPL-MCNC: 1.26 MG/DL (ref 0.55–1.02)
DIFFERENTIAL METHOD BLD: ABNORMAL
EOSINOPHIL # BLD AUTO: 0.02 K/UL (ref 0–0.5)
EOSINOPHIL NFR BLD AUTO: 0.2 % (ref 1–4)
EOSINOPHIL NFR BLD MANUAL: 2 % (ref 1–4)
ERYTHROCYTE [DISTWIDTH] IN BLOOD BY AUTOMATED COUNT: 14.2 % (ref 11.5–14.5)
GLOBULIN SER-MCNC: 2.7 G/DL (ref 2–4)
GLUCOSE SERPL-MCNC: 213 MG/DL (ref 74–106)
GLUCOSE UR STRIP-MCNC: NEGATIVE MG/DL
HCT VFR BLD AUTO: 35.7 % (ref 38–47)
HGB BLD-MCNC: 11.6 G/DL (ref 12–16)
HYPOCHROMIA BLD QL SMEAR: ABNORMAL
IMM GRANULOCYTES # BLD AUTO: 0.06 K/UL (ref 0–0.04)
IMM GRANULOCYTES NFR BLD: 0.5 % (ref 0–0.4)
KETONES UR STRIP-SCNC: NEGATIVE MG/DL
LACTATE SERPL-SCNC: 2.2 MMOL/L (ref 0.4–2)
LACTATE SERPL-SCNC: 2.4 MMOL/L (ref 0.4–2)
LEUKOCYTE ESTERASE UR QL STRIP: NEGATIVE
LYMPHOCYTES # BLD AUTO: 0.36 K/UL (ref 1–4.8)
LYMPHOCYTES NFR BLD AUTO: 2.7 % (ref 27–41)
LYMPHOCYTES NFR BLD MANUAL: 5 % (ref 27–41)
MCH RBC QN AUTO: 29 PG (ref 27–31)
MCHC RBC AUTO-ENTMCNC: 32.5 G/DL (ref 32–36)
MCV RBC AUTO: 89.3 FL (ref 80–96)
MONOCYTES # BLD AUTO: 0.63 K/UL (ref 0–0.8)
MONOCYTES NFR BLD AUTO: 4.7 % (ref 2–6)
MONOCYTES NFR BLD MANUAL: 6 % (ref 2–6)
MPC BLD CALC-MCNC: 9.8 FL (ref 9.4–12.4)
NEUTROPHILS # BLD AUTO: 12.22 K/UL (ref 1.8–7.7)
NEUTROPHILS NFR BLD AUTO: 91.7 % (ref 53–65)
NEUTS BAND NFR BLD MANUAL: 6 % (ref 1–5)
NEUTS SEG NFR BLD MANUAL: 81 % (ref 50–62)
NITRITE UR QL STRIP: NEGATIVE
NRBC # BLD AUTO: 0 X10E3/UL
NRBC, AUTO (.00): 0 %
OVALOCYTES BLD QL SMEAR: ABNORMAL
PH UR STRIP: 5.5 PH UNITS
PLATELET # BLD AUTO: 221 K/UL (ref 150–400)
PLATELET MORPHOLOGY: ABNORMAL
POLYCHROMASIA BLD QL SMEAR: ABNORMAL
POTASSIUM SERPL-SCNC: 3.5 MMOL/L (ref 3.5–5.1)
PROT SERPL-MCNC: 6.5 G/DL (ref 6.4–8.2)
PROT UR QL STRIP: NEGATIVE
RBC # BLD AUTO: 4 M/UL (ref 4.2–5.4)
RBC # UR STRIP: NEGATIVE /UL
SODIUM SERPL-SCNC: 138 MMOL/L (ref 136–145)
SP GR UR STRIP: 1.01
UROBILINOGEN UR STRIP-ACNC: 0.2 MG/DL
WBC # BLD AUTO: 13.31 K/UL (ref 4.5–11)

## 2022-04-09 PROCEDURE — 99283 PR EMERGENCY DEPT VISIT,LEVEL III: ICD-10-PCS | Mod: ,,, | Performed by: EMERGENCY MEDICINE

## 2022-04-09 PROCEDURE — 63600175 PHARM REV CODE 636 W HCPCS: Performed by: EMERGENCY MEDICINE

## 2022-04-09 PROCEDURE — 83605 ASSAY OF LACTIC ACID: CPT | Performed by: EMERGENCY MEDICINE

## 2022-04-09 PROCEDURE — 96361 HYDRATE IV INFUSION ADD-ON: CPT

## 2022-04-09 PROCEDURE — 96365 THER/PROPH/DIAG IV INF INIT: CPT

## 2022-04-09 PROCEDURE — 99285 EMERGENCY DEPT VISIT HI MDM: CPT | Mod: 25

## 2022-04-09 PROCEDURE — 93010 ELECTROCARDIOGRAM REPORT: CPT | Mod: ,,, | Performed by: STUDENT IN AN ORGANIZED HEALTH CARE EDUCATION/TRAINING PROGRAM

## 2022-04-09 PROCEDURE — 80053 COMPREHEN METABOLIC PANEL: CPT | Performed by: EMERGENCY MEDICINE

## 2022-04-09 PROCEDURE — 36415 COLL VENOUS BLD VENIPUNCTURE: CPT | Performed by: EMERGENCY MEDICINE

## 2022-04-09 PROCEDURE — 81003 URINALYSIS AUTO W/O SCOPE: CPT | Performed by: EMERGENCY MEDICINE

## 2022-04-09 PROCEDURE — 96375 TX/PRO/DX INJ NEW DRUG ADDON: CPT

## 2022-04-09 PROCEDURE — 85025 COMPLETE CBC W/AUTO DIFF WBC: CPT | Performed by: EMERGENCY MEDICINE

## 2022-04-09 PROCEDURE — 93010 EKG 12-LEAD: ICD-10-PCS | Mod: ,,, | Performed by: STUDENT IN AN ORGANIZED HEALTH CARE EDUCATION/TRAINING PROGRAM

## 2022-04-09 PROCEDURE — 93005 ELECTROCARDIOGRAM TRACING: CPT

## 2022-04-09 PROCEDURE — 99283 EMERGENCY DEPT VISIT LOW MDM: CPT | Mod: ,,, | Performed by: EMERGENCY MEDICINE

## 2022-04-09 PROCEDURE — S0073 INJECTION, AZTREONAM, 500 MG: HCPCS | Performed by: EMERGENCY MEDICINE

## 2022-04-09 PROCEDURE — 25000003 PHARM REV CODE 250: Performed by: EMERGENCY MEDICINE

## 2022-04-09 PROCEDURE — 87040 BLOOD CULTURE FOR BACTERIA: CPT | Performed by: EMERGENCY MEDICINE

## 2022-04-09 RX ORDER — ONDANSETRON 2 MG/ML
4 INJECTION INTRAMUSCULAR; INTRAVENOUS
Status: COMPLETED | OUTPATIENT
Start: 2022-04-09 | End: 2022-04-09

## 2022-04-09 RX ORDER — CIPROFLOXACIN 500 MG/1
500 TABLET ORAL 2 TIMES DAILY
Qty: 10 TABLET | Refills: 0 | Status: SHIPPED | OUTPATIENT
Start: 2022-04-09 | End: 2022-04-14

## 2022-04-09 RX ORDER — CIPROFLOXACIN 500 MG/1
500 TABLET ORAL 2 TIMES DAILY
Qty: 20 TABLET | Refills: 0 | Status: SHIPPED | OUTPATIENT
Start: 2022-04-09 | End: 2022-04-09 | Stop reason: SDUPTHER

## 2022-04-09 RX ADMIN — SODIUM CHLORIDE 1000 ML: 9 INJECTION, SOLUTION INTRAVENOUS at 05:04

## 2022-04-09 RX ADMIN — ONDANSETRON 4 MG: 2 INJECTION INTRAMUSCULAR; INTRAVENOUS at 05:04

## 2022-04-09 RX ADMIN — AZTREONAM 1000 MG: 1 INJECTION, POWDER, LYOPHILIZED, FOR SOLUTION INTRAMUSCULAR; INTRAVENOUS at 08:04

## 2022-04-09 RX ADMIN — SODIUM CHLORIDE, POTASSIUM CHLORIDE, SODIUM LACTATE AND CALCIUM CHLORIDE 1000 ML: 600; 310; 30; 20 INJECTION, SOLUTION INTRAVENOUS at 07:04

## 2022-04-09 NOTE — ED PROVIDER NOTES
Encounter Date: 4/9/2022       History     Chief Complaint   Patient presents with    Chills    Vomiting    Diarrhea     78 y/o female who has ulcerative colitis presents with fever, chills, nausea, vomiting and diarrhea.  She says the diarrhea is not unusual for her with her UC, but the fever, chills, and nausea / vomiting are.  Onset was about 4 hours ago.  She reports having received remicade.        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 No pertinent family history.  Social History     Tobacco Use    Smoking status: Never Smoker    Smokeless tobacco: Never Used   Substance Use Topics    Alcohol use: Never    Drug use: Never     Review of Systems   All other systems reviewed and are negative.      Physical Exam     Initial Vitals [04/09/22 1645]   BP Pulse Resp Temp SpO2   (!) 111/48 101 16 99.4 °F (37.4 °C) (!) 94 %      MAP       --         Physical Exam    Nursing note and vitals reviewed.  Constitutional: She appears well-developed and well-nourished.   HENT:   Head: Normocephalic and atraumatic.   Nose: Nose normal.   Mouth/Throat: Oropharynx is clear and moist.   Eyes: Conjunctivae and EOM are normal. Pupils are equal, round,  and reactive to light.   Neck: Neck supple.   Normal range of motion.  Cardiovascular: Regular rhythm, normal heart sounds and intact distal pulses.   Slightly tachycardic.   Pulmonary/Chest: Breath sounds normal.   Abdominal: Abdomen is soft. Bowel sounds are normal.   Musculoskeletal:         General: Normal range of motion.      Cervical back: Normal range of motion and neck supple.     Neurological: She is alert and oriented to person, place, and time. She has normal strength. GCS score is 15. GCS eye subscore is 4. GCS verbal subscore is 5. GCS motor subscore is 6.   Skin: Skin is warm and dry. Capillary refill takes less than 2 seconds.   Psychiatric: She has a normal mood and affect. Thought content normal.         Medical Screening Exam   See Full Note    ED Course   Procedures  Labs Reviewed   COMPREHENSIVE METABOLIC PANEL - Abnormal; Notable for the following components:       Result Value    Anion Gap 18 (*)     Glucose 213 (*)     BUN 28 (*)     Creatinine 1.26 (*)     BUN/Creatinine Ratio 22 (*)     AST 46 (*)     eGFR 44 (*)     All other components within normal limits   CBC WITH DIFFERENTIAL - Abnormal; Notable for the following components:    WBC 13.31 (*)     RBC 4.00 (*)     Hemoglobin 11.6 (*)     Hematocrit 35.7 (*)     Neutrophils % 91.7 (*)     Lymphocytes % 2.7 (*)     Eosinophils % 0.2 (*)     Immature Granulocytes % 0.5 (*)     Neutrophils, Abs 12.22 (*)     Lymphocytes, Absolute 0.36 (*)     Immature Granulocytes, Absolute 0.06 (*)     All other components within normal limits   MANUAL DIFFERENTIAL - Abnormal; Notable for the following components:    Segmented Neutrophils, Man % 81 (*)     Bands, Man % 6 (*)     Lymphocytes, Man % 5 (*)     Platelet Morphology Few Large Platelets (*)     All other components within normal limits   LACTIC ACID, PLASMA - Abnormal; Notable for the following components:    Lactic Acid 2.2 (*)     All other components within normal limits   LACTIC ACID, PLASMA  - Abnormal; Notable for the following components:    Lactic Acid 2.4 (*)     All other components within normal limits   URINALYSIS, REFLEX TO URINE CULTURE - Normal   CULTURE, BLOOD   CULTURE, BLOOD   CBC W/ AUTO DIFFERENTIAL    Narrative:     The following orders were created for panel order CBC auto differential.  Procedure                               Abnormality         Status                     ---------                               -----------         ------                     CBC with Differential[[id number]]        Abnormal            Final result               Manual Differential[[id number]]          Abnormal            Final result                 Please view results for these tests on the individual orders.        ECG Results          EKG 12-lead (In process)  Result time 04/10/22 08:37:24    In process by Interface, Lab In [facility] (04/10/22 08:37:24)                 Narrative:    Test Reason : R50.9,    Vent. Rate : 080 BPM     Atrial Rate : 000 BPM     P-R Int : 178 ms          QRS Dur : 128 ms      QT Int : 420 ms       P-R-T Axes : 053 -25 024 degrees     QTc Int : 455 ms    Sinus rhythm  Leftward axis  Right bundle branch block  Possible anterior infarct - age undetermined  Abnormal ECG      Referred By: [email or web address]   SELF           Confirmed By:                             Imaging Results          X-Ray Chest AP Portable (Final result)  Result time 04/09/22 19:04:37    Final result by Tavon Lambert II, MD (04/09/22 19:04:37)                 Impression:      Chronic lung changes.  No acute process .      Electronically signed by: Tavon Lambert  Date:    04/09/2022  Time:    19:04             Narrative:    EXAMINATION:  XR CHEST AP PORTABLE    CLINICAL HISTORY:  Sepsis;    COMPARISON:  None.    FINDINGS:  The heart and mediastinum are normal in size and configuration.  The pulmonary vascularity is normal in caliber. Lung volumes are increased with prominent bronchial markings.  No lung  infiltrates, effusions, pneumothorax or other abnormality is demonstrated.                                 Medications   sodium chloride 0.9% bolus 1,000 mL (0 mLs Intravenous Stopped 4/9/22 1847)   ondansetron injection 4 mg (4 mg Intravenous Given 4/9/22 1736)   lactated ringers bolus 1,947 mL (0 mL/kg × 64.9 kg Intravenous Stopped 4/9/22 2008)   aztreonam (AZACTAM) 1,000 mg in dextrose 5 % in water (D5W) 5 % 50 mL IVPB (MB+) (0 mg Intravenous Stopped 4/9/22 2037)                       Clinical Impression:   Final diagnoses:  [R50.9] Fever  [R68.83] Chills (Primary)  [R19.7] Diarrhea, unspecified type          ED Disposition Condition    Discharge Stable        ED Prescriptions     Medication Sig Dispense Start Date End Date Auth. Provider    ciprofloxacin HCl (CIPRO) 500 MG tablet  (Status: Discontinued) Take 1 tablet (500 mg total) by mouth 2 (two) times daily. for 10 days 20 tablet 4/9/2022 4/9/2022 Jose Guadalupe Strickland MD    ciprofloxacin HCl (CIPRO) 500 MG tablet Take 1 tablet (500 mg total) by mouth 2 (two) times daily. for 5 days 10 tablet 4/9/2022 4/14/2022 Jose Guadalupe Strickland MD        Follow-up Information    None          Jose Guadalupe Strickland MD  04/12/22 010

## 2022-04-09 NOTE — ED TRIAGE NOTES
Pt presents to ed with c/o fever, chills, diarrhea, incontinence, and vomiting that started today. Patient received an infusion 2 days ago for her ulcerative colitis and is worried she is having a reaction.

## 2022-04-13 ENCOUNTER — TELEPHONE (OUTPATIENT)
Dept: GASTROENTEROLOGY | Facility: CLINIC | Age: 77
End: 2022-04-13
Payer: MEDICARE

## 2022-04-13 RX ORDER — ACETAMINOPHEN 325 MG/1
650 TABLET ORAL DAILY
Status: CANCELLED
Start: 2022-04-13

## 2022-04-13 RX ORDER — ACETAMINOPHEN 325 MG/1
650 TABLET ORAL DAILY
Status: CANCELLED
Start: 2022-05-18

## 2022-04-13 RX ORDER — DIPHENHYDRAMINE HYDROCHLORIDE 50 MG/ML
25 INJECTION INTRAMUSCULAR; INTRAVENOUS
Status: CANCELLED
Start: 2022-04-13

## 2022-04-13 RX ORDER — DIPHENHYDRAMINE HYDROCHLORIDE 50 MG/ML
25 INJECTION INTRAMUSCULAR; INTRAVENOUS
Status: CANCELLED
Start: 2022-05-18

## 2022-04-13 NOTE — TELEPHONE ENCOUNTER
Patient called stating that she was in the ED on 04/09/2022 with fever chills. Patient states she was told he had a infection but was not told what kind of infection. Patient has UC and take Remicaid infusions. Patients last infusion was 04/07/2022. Discussed with MELANIA Stevenson and Dr. Salcido. Dr. Salcido states that he does not feel the remicaid caused these symptoms. MELANIA Stevenson recommendations patient be pre-medicated with Benadryl 25 mg IVP and Tylenol 650 mg po prior to every infusion. Instructed patient of recommendations. Patient verbalized good understanding. Instructed that medication will be added to therapy plan and given at infusion.      ----- Message from Bibiana Doe sent at 4/11/2022 11:55 AM CDT -----  Went to ER  Saturday wanted to let you know and to please call her

## 2022-04-15 LAB
BACTERIA BLD CULT: NORMAL
BACTERIA BLD CULT: NORMAL

## 2022-04-21 ENCOUNTER — OFFICE VISIT (OUTPATIENT)
Dept: DERMATOLOGY | Facility: CLINIC | Age: 77
End: 2022-04-21
Payer: MEDICARE

## 2022-04-21 VITALS — HEIGHT: 62 IN | WEIGHT: 143 LBS | RESPIRATION RATE: 16 BRPM | BODY MASS INDEX: 26.31 KG/M2

## 2022-04-21 DIAGNOSIS — K51.919 ULCERATIVE COLITIS WITH COMPLICATION, UNSPECIFIED LOCATION: ICD-10-CM

## 2022-04-21 DIAGNOSIS — L40.9 SCALP PSORIASIS: Primary | ICD-10-CM

## 2022-04-21 DIAGNOSIS — R21 RASH: ICD-10-CM

## 2022-04-21 PROCEDURE — 99203 OFFICE O/P NEW LOW 30 MIN: CPT | Mod: ,,, | Performed by: DERMATOLOGY

## 2022-04-21 PROCEDURE — 99203 PR OFFICE/OUTPT VISIT, NEW, LEVL III, 30-44 MIN: ICD-10-PCS | Mod: ,,, | Performed by: DERMATOLOGY

## 2022-04-21 RX ORDER — KETOCONAZOLE 20 MG/ML
SHAMPOO, SUSPENSION TOPICAL
Qty: 120 ML | Refills: 11 | Status: SHIPPED | OUTPATIENT
Start: 2022-04-21

## 2022-04-21 RX ORDER — CLOBETASOL PROPIONATE 0.46 MG/ML
SOLUTION TOPICAL
Qty: 50 ML | Refills: 3 | Status: SHIPPED | OUTPATIENT
Start: 2022-04-21

## 2022-04-21 NOTE — PROGRESS NOTES
[facility]   Charis Estrella MD    Patient Name: Jose Russell  Patient YOB: 1945   Date of Service: 4/21/22    CC: Rash    HPI: Jose Russell is a 77 y.o. female here today for rash, located on the scalp.  Rash has been present for years.  Previous treatments include none.      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     Past Surgical History:   Procedure Laterality Date    CHOLECYSTECTOMY      COLONOSCOPY  06/12/2020    repeat in 3 years    ESOPHAGOGASTRODUODENOSCOPY  05/24/2018    HYSTERECTOMY      NEPHRECTOMY       Review of patient's allergies indicates:   Allergen Reactions    Levofloxacin in d5w     Mercaptopurine analogues (thiopurines)     Penicillins     Sulfa (sulfonamide antibiotics)        Current Outpatient Medications:     allopurinoL (ZYLOPRIM) 100 MG tablet, Take 200 mg by mouth once daily., Disp: , Rfl:     aspirin (ECOTRIN) 81 MG EC tablet, Take 81 mg by mouth once daily., Disp: , Rfl:     atenoloL (TENORMIN) 25 MG tablet, Take 25 mg by mouth once daily., Disp: , Rfl:     azithromycin (ZITHROMAX Z-AKHIL) 250 MG tablet, Take two tablets today and then one tablet daily for four days, Disp: 6 tablet, Rfl: 0    Bifidobacterium infantis (ALIGN ORAL), Take by mouth once daily., Disp: , Rfl:     citalopram (CELEXA) 20 MG tablet, Take 20 mg by mouth once daily., Disp: , Rfl:     clobetasoL (TEMOVATE) 0.05 % external solution, Apply to AA on scalp BID PRN flares, Disp: 50 mL, Rfl: 3    esomeprazole (NEXIUM) 40 MG capsule, Take 40 mg by mouth once daily., Disp: , Rfl:     glyBURIDE (DIABETA) 5 MG  tablet, Take 5 mg by mouth 2 (two) times daily with meals., Disp: , Rfl:     hydrALAZINE (APRESOLINE) 50 MG tablet, Take 50 mg by mouth once daily., Disp: , Rfl:     ketoconazole (NIZORAL) 2 % shampoo, Use as a scalp treatment 2-3 times a week for maintenance, massaging into scalp and leaving on for up to 3 minutes before rinsing, Disp: 120 mL, Rfl: 11    levothyroxine (SYNTHROID) 50 MCG tablet, Take 50 mcg by mouth before breakfast., Disp: , Rfl:     mesalamine (ASACOL HD) 800 mg TbEC, Take 2 tablets (1,600 mg total) by mouth 3 (three) times daily., Disp: 540 tablet, Rfl: 3    omega-3 fatty acids/fish oil (FISH OIL-OMEGA-3 FATTY ACIDS) 300-1,000 mg capsule, Take by mouth once daily., Disp: , Rfl:     pravastatin (PRAVACHOL) 40 MG tablet, Take 40 mg by mouth once daily., Disp: , Rfl:     sucralfate (CARAFATE) 1 gram tablet, Take 1 tablet (1 g total) by mouth 4 (four) times daily as needed (Epigastric pain)., Disp: 100 tablet, Rfl: 1    torsemide (DEMADEX) 20 MG Tab, Take 10 mg by mouth once daily., Disp: , Rfl:     verapamiL (VERELAN) 240 MG C24P, Take 240 mg by mouth once daily., Disp: , Rfl:     ROS: A focused review of systems was obtained and negative.     Exam: A focused skin exam was performed. All areas examined were normal except as mentioned in the assessment and plan below.  General Appearance of the patient is well developed and well nourished.  Orientation: alert and oriented x 3.  Mood and affect: pleasant.    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 Diagnoses of Ulcerative colitis with complication, unspecified location and Rash were also pertinent to this visit.    Plan:   Scalp Psoriasis  - Psoriasiform plaques with micaceous scale    Plan: Counseling.  I counseled the patient regarding the following:  Skin care: Emollients, ambient sun exposure, shampoos with tar, selenium or zinc pyrithione can improve  psoriasis. Topical steroids and vitamin D analogs can help  more severe cases.  Expectations: Scalp psoriasis is a common type of localized psoriasis. Scalp psoriasis often stays localized to  the scalp. Psoriasis is chronic in nature with periods of remissions and flares. Flares can be triggered by stress,  infections (group A strep), certain medications and alcohol.  Contact office if: Psoriasis worsens, or fails to improve despite several months of treatment.    - much improved after starting remicade for RA  - will start topicals to help clear 100%    Medications Ordered This Encounter   Medications    clobetasoL (TEMOVATE) 0.05 % external solution     Sig: Apply to AA on scalp BID PRN flares     Dispense:  50 mL     Refill:  3    ketoconazole (NIZORAL) 2 % shampoo     Sig: Use as a scalp treatment 2-3 times a week for maintenance, massaging into scalp and leaving on for up to 3 minutes before rinsing     Dispense:  120 mL     Refill:  11         Follow up in about 3 months (around 7/21/2022).    Charis Estrella MD

## 2022-04-25 ENCOUNTER — INFUSION (OUTPATIENT)
Dept: INFUSION THERAPY | Facility: HOSPITAL | Age: 77
End: 2022-04-25
Attending: NURSE PRACTITIONER
Payer: MEDICARE

## 2022-04-25 VITALS
BODY MASS INDEX: 25.95 KG/M2 | RESPIRATION RATE: 16 BRPM | TEMPERATURE: 98 F | OXYGEN SATURATION: 97 % | HEART RATE: 59 BPM | DIASTOLIC BLOOD PRESSURE: 69 MMHG | SYSTOLIC BLOOD PRESSURE: 155 MMHG | WEIGHT: 141 LBS | HEIGHT: 62 IN

## 2022-04-25 DIAGNOSIS — K51.819 OTHER ULCERATIVE COLITIS WITH COMPLICATION: Primary | ICD-10-CM

## 2022-04-25 PROCEDURE — 25000003 PHARM REV CODE 250

## 2022-04-25 PROCEDURE — 63600175 PHARM REV CODE 636 W HCPCS: Performed by: NURSE PRACTITIONER

## 2022-04-25 PROCEDURE — 63600175 PHARM REV CODE 636 W HCPCS

## 2022-04-25 PROCEDURE — 96365 THER/PROPH/DIAG IV INF INIT: CPT

## 2022-04-25 PROCEDURE — 96375 TX/PRO/DX INJ NEW DRUG ADDON: CPT

## 2022-04-25 PROCEDURE — 25000003 PHARM REV CODE 250: Performed by: NURSE PRACTITIONER

## 2022-04-25 PROCEDURE — 96366 THER/PROPH/DIAG IV INF ADDON: CPT

## 2022-04-25 RX ORDER — SODIUM CHLORIDE 0.9 % (FLUSH) 0.9 %
10 SYRINGE (ML) INJECTION
Status: DISCONTINUED | OUTPATIENT
Start: 2022-04-25 | End: 2022-04-25 | Stop reason: HOSPADM

## 2022-04-25 RX ORDER — DIPHENHYDRAMINE HYDROCHLORIDE 50 MG/ML
25 INJECTION INTRAMUSCULAR; INTRAVENOUS
Status: CANCELLED
Start: 2022-05-18

## 2022-04-25 RX ORDER — SODIUM CHLORIDE 0.9 % (FLUSH) 0.9 %
10 SYRINGE (ML) INJECTION
Status: CANCELLED | OUTPATIENT
Start: 2022-05-18

## 2022-04-25 RX ORDER — ACETAMINOPHEN 325 MG/1
650 TABLET ORAL DAILY
Status: DISCONTINUED | OUTPATIENT
Start: 2022-04-25 | End: 2022-04-25 | Stop reason: HOSPADM

## 2022-04-25 RX ORDER — ACETAMINOPHEN 325 MG/1
650 TABLET ORAL DAILY
Status: CANCELLED
Start: 2022-05-18

## 2022-04-25 RX ORDER — DIPHENHYDRAMINE HYDROCHLORIDE 50 MG/ML
25 INJECTION INTRAMUSCULAR; INTRAVENOUS
Status: DISCONTINUED | OUTPATIENT
Start: 2022-04-25 | End: 2022-04-25 | Stop reason: HOSPADM

## 2022-04-25 RX ADMIN — DIPHENHYDRAMINE HYDROCHLORIDE 25 MG: 50 INJECTION, SOLUTION INTRAMUSCULAR; INTRAVENOUS at 01:04

## 2022-04-25 RX ADMIN — ACETAMINOPHEN 650 MG: 325 TABLET ORAL at 01:04

## 2022-04-25 NOTE — PROGRESS NOTES
1310-pt here for remicade infusion. Vs obtained.   1322-pre infusion meds given.  1329-remicade started at 20 ml/hr  1345-increase to 40 ml/hr  1400-increased to 80 ml/hr  1415-increased to 120 ml/hr  1430-increased to 150 ml/hr  1450-increased to 250 ml/hr  1533-infusion completed.  1545-no s/s of reaction noted. Out ambulatory with marie and return appointment.

## 2022-05-26 ENCOUNTER — OFFICE VISIT (OUTPATIENT)
Dept: GASTROENTEROLOGY | Facility: CLINIC | Age: 77
End: 2022-05-26
Payer: MEDICARE

## 2022-05-26 VITALS
DIASTOLIC BLOOD PRESSURE: 51 MMHG | HEART RATE: 68 BPM | OXYGEN SATURATION: 95 % | WEIGHT: 147.38 LBS | HEIGHT: 62 IN | SYSTOLIC BLOOD PRESSURE: 135 MMHG | BODY MASS INDEX: 27.12 KG/M2

## 2022-05-26 DIAGNOSIS — R35.0 URINARY FREQUENCY: ICD-10-CM

## 2022-05-26 DIAGNOSIS — R19.7 DIARRHEA, UNSPECIFIED TYPE: Primary | ICD-10-CM

## 2022-05-26 PROCEDURE — 99213 OFFICE O/P EST LOW 20 MIN: CPT | Mod: ,,, | Performed by: NURSE PRACTITIONER

## 2022-05-26 PROCEDURE — 99213 PR OFFICE/OUTPT VISIT, EST, LEVL III, 20-29 MIN: ICD-10-PCS | Mod: ,,, | Performed by: NURSE PRACTITIONER

## 2022-05-26 RX ORDER — METRONIDAZOLE 500 MG/1
500 TABLET ORAL EVERY 12 HOURS
Qty: 14 TABLET | Refills: 1 | Status: SHIPPED | OUTPATIENT
Start: 2022-05-26 | End: 2022-06-09

## 2022-05-26 NOTE — PROGRESS NOTES
Jose Russell is a 77 y.o. female here for Follow-up        PCP: Alyssa Michele  Referring Provider: No referring provider defined for this encounter.     HPI:  Presents for follow up appointment for UC. Did bring a log to the clinic of bowel movements. Continues to have fecal urgency. At times no stool only blood and mucus. She has had 3 Remicade infusions. She is taking Asacol as prescribed. Does have some intermittent abdominal cramping. Appetite has improved. No further weight loss. Weight is up 4 lbs. Today she is reporting low back pain and urinary frequency. She is requesting a urinalysis. Denies hematuria.        ROS:  Review of Systems   Constitutional: Negative for appetite change, fatigue, fever and unexpected weight change.   HENT: Negative for trouble swallowing.    Respiratory: Negative for shortness of breath and wheezing.    Cardiovascular: Negative for chest pain and palpitations.   Gastrointestinal: Positive for blood in stool, diarrhea and fecal incontinence. Negative for abdominal pain, anal bleeding, change in bowel habit, constipation, nausea, rectal pain, vomiting, reflux and change in bowel habit.   Genitourinary: Negative for dysuria and hematuria.   Musculoskeletal: Negative for gait problem and joint swelling.   Integumentary:  Negative for pallor.   Neurological: Negative for dizziness and light-headedness.   Hematological: Does not bruise/bleed easily.   Psychiatric/Behavioral: The patient is not nervous/anxious.           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    PSHX:  has a past surgical history that includes Colonoscopy (06/12/2020);  Esophagogastroduodenoscopy (05/24/2018); Hysterectomy; Cholecystectomy; and Nephrectomy.    PFHX: family history is not on file.    PSlHX:  reports that she has never smoked. She has never used smokeless tobacco. She reports that she does not drink alcohol and does not use drugs.        Review of patient's allergies indicates:   Allergen Reactions    Levofloxacin in d5w     Mercaptopurine analogues (thiopurines)     Penicillins     Sulfa (sulfonamide antibiotics)        Medication List with Changes/Refills   New Medications    METRONIDAZOLE (FLAGYL) 500 MG TABLET    Take 1 tablet (500 mg total) by mouth every 12 (twelve) hours. for 14 days   Current Medications    ALLOPURINOL (ZYLOPRIM) 100 MG TABLET    Take 200 mg by mouth once daily.    ASPIRIN (ECOTRIN) 81 MG EC TABLET    Take 81 mg by mouth once daily.    ATENOLOL (TENORMIN) 25 MG TABLET    Take 25 mg by mouth once daily.    AZITHROMYCIN (ZITHROMAX Z-AKHIL) 250 MG TABLET    Take two tablets today and then one tablet daily for four days    BIFIDOBACTERIUM INFANTIS (ALIGN ORAL)    Take by mouth once daily.    CITALOPRAM (CELEXA) 20 MG TABLET    Take 20 mg by mouth once daily.    CLOBETASOL (TEMOVATE) 0.05 % EXTERNAL SOLUTION    Apply to AA on scalp BID PRN flares    ESOMEPRAZOLE (NEXIUM) 40 MG CAPSULE    Take 40 mg by mouth once daily.    GLYBURIDE (DIABETA) 5 MG TABLET    Take 5 mg by mouth 2 (two) times daily with meals.    HYDRALAZINE (APRESOLINE) 50 MG TABLET    Take 50 mg by mouth once daily.    KETOCONAZOLE (NIZORAL) 2 % SHAMPOO    Use as a scalp treatment 2-3 times a week for maintenance, massaging into scalp and leaving on for up to 3 minutes before rinsing    LEVOTHYROXINE (SYNTHROID) 50 MCG TABLET    Take 50 mcg by mouth before breakfast.    MESALAMINE (ASACOL HD) 800 MG TBEC    Take 2 tablets (1,600 mg total) by mouth 3 (three) times daily.    OMEGA-3 FATTY ACIDS/FISH OIL (FISH OIL-OMEGA-3 FATTY ACIDS) 300-1,000 MG CAPSULE    Take by mouth once daily.  "   PRAVASTATIN (PRAVACHOL) 40 MG TABLET    Take 40 mg by mouth once daily.    SUCRALFATE (CARAFATE) 1 GRAM TABLET    Take 1 tablet (1 g total) by mouth 4 (four) times daily as needed (Epigastric pain).    TORSEMIDE (DEMADEX) 20 MG TAB    Take 10 mg by mouth once daily.    VERAPAMIL (VERELAN) 240 MG C24P    Take 240 mg by mouth once daily.        Objective Findings:  Vital Signs:  BP (!) 135/51   Pulse 68   Ht 5' 2" (1.575 m)   Wt 66.9 kg (147 lb 6.4 oz)   SpO2 95%   BMI 26.96 kg/m²  Body mass index is 26.96 kg/m².    Physical Exam:  Physical Exam  Vitals and nursing note reviewed.   Constitutional:       General: She is not in acute distress.     Appearance: Normal appearance. She is not ill-appearing.   HENT:      Mouth/Throat:      Mouth: Mucous membranes are moist.   Eyes:      Extraocular Movements: Extraocular movements intact.   Cardiovascular:      Rate and Rhythm: Normal rate.   Pulmonary:      Breath sounds: No wheezing, rhonchi or rales.   Abdominal:      General: Bowel sounds are normal. There is no distension.      Palpations: Abdomen is soft. There is no mass.      Tenderness: There is no abdominal tenderness. There is no guarding or rebound.      Hernia: No hernia is present.   Skin:     General: Skin is warm and dry.      Coloration: Skin is not jaundiced or pale.   Neurological:      Mental Status: She is alert and oriented to person, place, and time.   Psychiatric:         Mood and Affect: Mood normal.          Labs:  Lab Results   Component Value Date    WBC 13.31 (H) 04/09/2022    HGB 11.6 (L) 04/09/2022    HCT 35.7 (L) 04/09/2022    MCV 89.3 04/09/2022    RDW 14.2 04/09/2022     04/09/2022    LYMPH 2.7 (L) 04/09/2022    LYMPH 0.36 (L) 04/09/2022    LYMPH 5 (L) 04/09/2022    MONO 4.7 04/09/2022    MONO 6 04/09/2022    EOS 0.02 04/09/2022    BASO 0.02 04/09/2022     Lab Results   Component Value Date     04/09/2022    K 3.5 04/09/2022     04/09/2022    CO2 21 04/09/2022    "  (H) 04/09/2022    BUN 28 (H) 04/09/2022    CREATININE 1.26 (H) 04/09/2022    CALCIUM 9.7 04/09/2022    PROT 6.5 04/09/2022    ALBUMIN 3.8 04/09/2022    BILITOT 0.4 04/09/2022    ALKPHOS 134 04/09/2022    AST 46 (H) 04/09/2022    ALT 46 04/09/2022         Imaging: No results found.      Assessment:  Jose Russell is a 77 y.o. female here with:  1. Diarrhea, unspecified type    2. Urinary frequency          Recommendations:  1. Flagyl 500 mg bid x 14 days. Avoid NSAID's. Continue Asacol. Keep appointment for next Remicade infusion.   Water intake of 64 ounces per day  2. Urinalysis today, will call with results    Follow up in about 6 weeks (around 7/7/2022).      Order summary:  Orders Placed This Encounter    Urinalysis, Reflex to Urine Culture    metroNIDAZOLE (FLAGYL) 500 MG tablet       Thank you for allowing me to participate in the care of Jose Russell.      CHRIS Abdi

## 2022-05-27 ENCOUNTER — TELEPHONE (OUTPATIENT)
Dept: GASTROENTEROLOGY | Facility: CLINIC | Age: 77
End: 2022-05-27
Payer: MEDICARE

## 2022-05-27 NOTE — TELEPHONE ENCOUNTER
Results called to patient. verbalized good understanding.    ----- Message from MELANIA Noel sent at 5/27/2022  7:17 AM CDT -----  Please let her know that urine is normal.

## 2022-05-28 ENCOUNTER — HOSPITAL ENCOUNTER (EMERGENCY)
Facility: HOSPITAL | Age: 77
Discharge: HOME | End: 2022-05-29
Attending: EMERGENCY MEDICINE
Payer: MEDICARE

## 2022-05-28 DIAGNOSIS — K52.9 COLITIS: Primary | ICD-10-CM

## 2022-05-28 DIAGNOSIS — K92.2 GI BLEED: ICD-10-CM

## 2022-05-28 LAB
ABO AND RH: NORMAL
ALBUMIN SERPL BCP-MCNC: 3.8 G/DL (ref 3.5–5)
ALBUMIN/GLOB SERPL: 1.2 {RATIO}
ALP SERPL-CCNC: 120 U/L (ref 55–142)
ALT SERPL W P-5'-P-CCNC: 56 U/L (ref 13–56)
ANION GAP SERPL CALCULATED.3IONS-SCNC: 14 MMOL/L (ref 7–16)
APTT PPP: 29.7 SECONDS (ref 25.2–37.3)
AST SERPL W P-5'-P-CCNC: 56 U/L (ref 15–37)
BACTERIA #/AREA URNS HPF: ABNORMAL /HPF
BASOPHILS # BLD AUTO: 0.03 K/UL (ref 0–0.2)
BASOPHILS NFR BLD AUTO: 0.2 % (ref 0–1)
BILIRUB SERPL-MCNC: 0.4 MG/DL (ref 0–1.2)
BILIRUB UR QL STRIP: NEGATIVE
BUN SERPL-MCNC: 26 MG/DL (ref 7–18)
BUN/CREAT SERPL: 23 (ref 6–20)
CALCIUM SERPL-MCNC: 10 MG/DL (ref 8.5–10.1)
CHLORIDE SERPL-SCNC: 102 MMOL/L (ref 98–107)
CLARITY UR: CLEAR
CO2 SERPL-SCNC: 24 MMOL/L (ref 21–32)
COLOR UR: YELLOW
CREAT SERPL-MCNC: 1.15 MG/DL (ref 0.55–1.02)
DIFFERENTIAL METHOD BLD: ABNORMAL
EOSINOPHIL # BLD AUTO: 0.13 K/UL (ref 0–0.5)
EOSINOPHIL NFR BLD AUTO: 1 % (ref 1–4)
ERYTHROCYTE [DISTWIDTH] IN BLOOD BY AUTOMATED COUNT: 13.7 % (ref 11.5–14.5)
FLUAV AG UPPER RESP QL IA.RAPID: NEGATIVE
FLUBV AG UPPER RESP QL IA.RAPID: NEGATIVE
GLOBULIN SER-MCNC: 3.1 G/DL (ref 2–4)
GLUCOSE SERPL-MCNC: 147 MG/DL (ref 74–106)
GLUCOSE UR STRIP-MCNC: NEGATIVE MG/DL
HCT VFR BLD AUTO: 38.1 % (ref 38–47)
HGB BLD-MCNC: 12.8 G/DL (ref 12–16)
HYALINE CASTS #/AREA URNS LPF: ABNORMAL /LPF
IMM GRANULOCYTES # BLD AUTO: 0.06 K/UL (ref 0–0.04)
IMM GRANULOCYTES NFR BLD: 0.4 % (ref 0–0.4)
INDIRECT COOMBS: NORMAL
INR BLD: 0.96 (ref 0.9–1.1)
KETONES UR STRIP-SCNC: NEGATIVE MG/DL
LACTATE SERPL-SCNC: 1.3 MMOL/L (ref 0.4–2)
LEUKOCYTE ESTERASE UR QL STRIP: ABNORMAL
LYMPHOCYTES # BLD AUTO: 0.71 K/UL (ref 1–4.8)
LYMPHOCYTES NFR BLD AUTO: 5.3 % (ref 27–41)
MCH RBC QN AUTO: 29.6 PG (ref 27–31)
MCHC RBC AUTO-ENTMCNC: 33.6 G/DL (ref 32–36)
MCV RBC AUTO: 88 FL (ref 80–96)
MONOCYTES # BLD AUTO: 1.31 K/UL (ref 0–0.8)
MONOCYTES NFR BLD AUTO: 9.8 % (ref 2–6)
MPC BLD CALC-MCNC: 10 FL (ref 9.4–12.4)
MUCOUS THREADS #/AREA URNS HPF: ABNORMAL /HPF
NEUTROPHILS # BLD AUTO: 11.11 K/UL (ref 1.8–7.7)
NEUTROPHILS NFR BLD AUTO: 83.3 % (ref 53–65)
NITRITE UR QL STRIP: NEGATIVE
NRBC # BLD AUTO: 0 X10E3/UL
NRBC, AUTO (.00): 0 %
PH UR STRIP: 5.5 PH UNITS
PLATELET # BLD AUTO: 263 K/UL (ref 150–400)
POC OCCULT BLOOD STOOL: POSITIVE
POTASSIUM SERPL-SCNC: 3.8 MMOL/L (ref 3.5–5.1)
PROT SERPL-MCNC: 6.9 G/DL (ref 6.4–8.2)
PROT UR QL STRIP: NEGATIVE
PROTHROMBIN TIME: 12.8 SECONDS (ref 11.7–14.7)
RBC # BLD AUTO: 4.33 M/UL (ref 4.2–5.4)
RBC # UR STRIP: ABNORMAL /UL
RBC #/AREA URNS HPF: ABNORMAL /HPF
RH BLD: NORMAL
SARS-COV+SARS-COV-2 AG RESP QL IA.RAPID: NEGATIVE
SODIUM SERPL-SCNC: 136 MMOL/L (ref 136–145)
SP GR UR STRIP: 1.01
SQUAMOUS #/AREA URNS LPF: ABNORMAL /LPF
TRICHOMONAS #/AREA URNS HPF: ABNORMAL /HPF
UROBILINOGEN UR STRIP-ACNC: 0.2 MG/DL
WBC # BLD AUTO: 13.35 K/UL (ref 4.5–11)
WBC #/AREA URNS HPF: ABNORMAL /HPF
YEAST #/AREA URNS HPF: ABNORMAL /HPF

## 2022-05-28 PROCEDURE — 87086 URINE CULTURE/COLONY COUNT: CPT | Performed by: EMERGENCY MEDICINE

## 2022-05-28 PROCEDURE — 80053 COMPREHEN METABOLIC PANEL: CPT | Performed by: EMERGENCY MEDICINE

## 2022-05-28 PROCEDURE — 99283 PR EMERGENCY DEPT VISIT,LEVEL III: ICD-10-PCS | Mod: ,,, | Performed by: EMERGENCY MEDICINE

## 2022-05-28 PROCEDURE — 85025 COMPLETE CBC W/AUTO DIFF WBC: CPT | Performed by: EMERGENCY MEDICINE

## 2022-05-28 PROCEDURE — 99283 EMERGENCY DEPT VISIT LOW MDM: CPT | Mod: ,,, | Performed by: EMERGENCY MEDICINE

## 2022-05-28 PROCEDURE — 86901 BLOOD TYPING SEROLOGIC RH(D): CPT | Performed by: EMERGENCY MEDICINE

## 2022-05-28 PROCEDURE — 96361 HYDRATE IV INFUSION ADD-ON: CPT

## 2022-05-28 PROCEDURE — 99285 EMERGENCY DEPT VISIT HI MDM: CPT | Mod: 25,CS

## 2022-05-28 PROCEDURE — 87428 SARSCOV & INF VIR A&B AG IA: CPT | Performed by: EMERGENCY MEDICINE

## 2022-05-28 PROCEDURE — 36415 COLL VENOUS BLD VENIPUNCTURE: CPT | Performed by: EMERGENCY MEDICINE

## 2022-05-28 PROCEDURE — 82272 OCCULT BLD FECES 1-3 TESTS: CPT

## 2022-05-28 PROCEDURE — 96374 THER/PROPH/DIAG INJ IV PUSH: CPT

## 2022-05-28 PROCEDURE — 85730 THROMBOPLASTIN TIME PARTIAL: CPT | Performed by: EMERGENCY MEDICINE

## 2022-05-28 PROCEDURE — 81001 URINALYSIS AUTO W/SCOPE: CPT | Performed by: EMERGENCY MEDICINE

## 2022-05-28 PROCEDURE — 83605 ASSAY OF LACTIC ACID: CPT | Performed by: EMERGENCY MEDICINE

## 2022-05-28 PROCEDURE — 63600175 PHARM REV CODE 636 W HCPCS: Performed by: EMERGENCY MEDICINE

## 2022-05-28 RX ORDER — ONDANSETRON 2 MG/ML
4 INJECTION INTRAMUSCULAR; INTRAVENOUS ONCE
Status: COMPLETED | OUTPATIENT
Start: 2022-05-28 | End: 2022-05-28

## 2022-05-28 RX ORDER — ONDANSETRON 4 MG/1
4 TABLET, ORALLY DISINTEGRATING ORAL
Status: DISCONTINUED | OUTPATIENT
Start: 2022-05-28 | End: 2022-05-28

## 2022-05-28 RX ADMIN — SODIUM CHLORIDE, POTASSIUM CHLORIDE, SODIUM LACTATE AND CALCIUM CHLORIDE 1000 ML: 600; 310; 30; 20 INJECTION, SOLUTION INTRAVENOUS at 08:05

## 2022-05-28 RX ADMIN — ONDANSETRON 4 MG: 2 INJECTION INTRAMUSCULAR; INTRAVENOUS at 08:05

## 2022-05-29 ENCOUNTER — TELEPHONE (OUTPATIENT)
Dept: EMERGENCY MEDICINE | Facility: HOSPITAL | Age: 77
End: 2022-05-29
Payer: MEDICARE

## 2022-05-29 VITALS
TEMPERATURE: 100 F | HEART RATE: 64 BPM | SYSTOLIC BLOOD PRESSURE: 140 MMHG | RESPIRATION RATE: 16 BRPM | HEIGHT: 62 IN | BODY MASS INDEX: 25.21 KG/M2 | DIASTOLIC BLOOD PRESSURE: 68 MMHG | OXYGEN SATURATION: 95 % | WEIGHT: 137 LBS

## 2022-05-29 RX ORDER — CIPROFLOXACIN 500 MG/1
500 TABLET ORAL 2 TIMES DAILY
Qty: 20 TABLET | Refills: 0 | Status: SHIPPED | OUTPATIENT
Start: 2022-05-29 | End: 2022-06-08

## 2022-05-29 RX ORDER — CIPROFLOXACIN 500 MG/1
500 TABLET ORAL 2 TIMES DAILY
Qty: 20 TABLET | Refills: 0 | Status: SHIPPED | OUTPATIENT
Start: 2022-05-29 | End: 2022-05-29 | Stop reason: SDUPTHER

## 2022-05-29 NOTE — ED PROVIDER NOTES
Encounter Date: 5/28/2022    SCRIBE #1 NOTE: I, Arianna Miller, am scribing for, and in the presence of,  Jose Guadalupe Strickland MD. I have scribed the entire note.       History     Chief Complaint   Patient presents with    Rectal Bleeding     X 2 days     Fever     Started running fever today      Patient is a 77 year old female who presents to the emergency department complaining of fever, chills, and rectal bleeding. Patient reports that she has a history of ulcerative colitis and was seen by her primary care physician on Thursday, 5/26/22 where she was prescribed flagyl 500 2x a day. She reports that she has been taking this medication as prescribed. Over the last 2 days she reports experiencing chills and involunatary shaking. She also reports an increase in bright red stools. As of today the patient reports a 101.3 F fever, nausea, and vomiting. Patient is followed by Dr. Salcido for gastroenterology.     The history is provided by the patient. No  was used.     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 No pertinent family history.  Social History     Tobacco Use    Smoking status: Never Smoker    Smokeless tobacco:  Never Used   Substance Use Topics    Alcohol use: Never    Drug use: Never     Review of Systems   Constitutional: Positive for chills and fever (101.7).   HENT: Negative.    Eyes: Negative.    Respiratory: Negative.    Cardiovascular: Negative.    Gastrointestinal: Positive for abdominal pain (lower), nausea and vomiting.        Rectal bleeding x 2 days   Endocrine: Negative.    Genitourinary: Positive for frequency.   Musculoskeletal: Negative.    Skin: Negative.    Allergic/Immunologic: Negative.    Neurological: Negative.    Hematological: Negative.    Psychiatric/Behavioral: Negative.    All other systems reviewed and are negative.      Physical Exam     Initial Vitals [05/28/22 1931]   BP Pulse Resp Temp SpO2   119/78 80 18 100.2 °F (37.9 °C) 95 %      MAP       --         Physical Exam    Vitals reviewed.  Constitutional: Vital signs are normal. She appears well-developed and well-nourished. She does not have a sickly appearance.   Cardiovascular: Normal rate and regular rhythm.   Pulmonary/Chest: Breath sounds normal.     Neurological: She is alert and oriented to person, place, and time.   Psychiatric: She has a normal mood and affect.         ED Course   Procedures  Labs Reviewed   COMPREHENSIVE METABOLIC PANEL - Abnormal; Notable for the following components:       Result Value    Glucose 147 (*)     BUN 26 (*)     Creatinine 1.15 (*)     BUN/Creatinine Ratio 23 (*)     AST 56 (*)     eGFR 49 (*)     All other components within normal limits   CBC WITH DIFFERENTIAL - Abnormal; Notable for the following components:    WBC 13.35 (*)     Neutrophils % 83.3 (*)     Lymphocytes % 5.3 (*)     Monocytes % 9.8 (*)     Neutrophils, Abs 11.11 (*)     Lymphocytes, Absolute 0.71 (*)     Monocytes, Absolute 1.31 (*)     Immature Granulocytes, Absolute 0.06 (*)     All other components within normal limits   URINALYSIS, REFLEX TO URINE CULTURE - Abnormal; Notable for the following components:    Leukocytes, UA Trace  (*)     Blood, UA Trace-Intact (*)     All other components within normal limits   URINALYSIS, MICROSCOPIC - Abnormal; Notable for the following components:    RBC, UA 15-25 (*)     Bacteria, UA Moderate (*)     Squamous Epithelial Cells, UA Moderate (*)     Mucus, UA Few (*)     Hyaline Casts, UA 0-2 (*)     All other components within normal limits   POCT OCCULT BLOOD (STOOL) - Abnormal   PT AND PTT - Normal   SARS-COV2 (COVID) W/ FLU ANTIGEN - Normal    Narrative:     Negative SARS-CoV results should not be used as the sole basis for treatment or patient management decisions; negative results should be considered in the context of a patient's recent exposures, history and the presene of clinical signs and symptoms consistent with COVID-19.  Negative results should be treated as presumptive and confirmed by molecular assay, if necessary for patient management.   LACTIC ACID, PLASMA - Normal   CULTURE, URINE   CBC W/ AUTO DIFFERENTIAL    Narrative:     The following orders were created for panel order CBC auto differential.  Procedure                               Abnormality         Status                     ---------                               -----------         ------                     CBC with Differential[[id number]]        Abnormal            Final result                 Please view results for these tests on the individual orders.   EXTRA TUBES    Narrative:     The following orders were created for panel order EXTRA TUBES.  Procedure                               Abnormality         Status                     ---------                               -----------         ------                     Light Green Top Hold[[id number]]                             In process                 Lavender Top Hold[[id number]]                                In process                   Please view results for these tests on the individual orders.   LIGHT GREEN TOP HOLD   LAVENDER TOP HOLD   EXTRA TUBES    Narrative:      The following orders were created for panel order EXTRA TUBES.  Procedure                               Abnormality         Status                     ---------                               -----------         ------                     Gold Top Hold[[id number]]                                    In process                   Please view results for these tests on the individual orders.   GOLD TOP HOLD   TYPE & SCREEN   ABORH RETYPE        19:47 Dr. Strickland reports at bedside to perform exam, patient not in room.     19:53 Dr. Strickland reports at bedside to perform exam, patient not in room.        Imaging Results          CT Abdomen Pelvis  Without Contrast (Final result)  Result time 05/29/22 09:20:20    Final result by Jimmy Villanueva MD (05/29/22 09:20:20)                 Impression:      There is evidence of sigmoid colitis without evidence of perforation or abscess    Diverticulosis of the colon without jenaa diverticulitis    Moderate retained stool in the colon      Electronically signed by: Jimmy Villanueva  Date:    05/29/2022  Time:    09:20             Narrative:    EXAMINATION:  CT ABDOMEN AND PELVIS without contrast    CLINICAL HISTORY:  Fever, chills, rectal bleeding.  History of ulcerative colitis    TECHNIQUE:  Axial CT images were obtained through the abdomen and pelvis without IV contrast.  Oral contrast was not given.  Coronal and sagittal reconstructions submitted and interpreted.  Total .8 mGycm.  Automated exposure control utilized.    The exam was also reviewed by [facility].    COMPARISON:  CT abdomen pelvis September 10, 2021    FINDINGS:  CT abdomen:    Partially visualized lung bases are clear.  There is no jeana pneumonia.    There is no evidence of pneumoperitoneum.    Prior cholecystectomy.    Liver, bile ducts, pancreas, spleen, and adrenal glands are unremarkable in noncontrast CT appearance.    Left kidney is absent.  There is no hydronephrosis or radiopaque calculus  associated with the right kidney.  There is no gross right renal mass seen on this noncontrast study.    There is smooth mural wall thickening of the colon at the distal sigmoid level.  There is diverticulosis of the colon without jeana diverticulitis.  There is moderate retained stool in the colon.  Appendix is not seen, though no secondary signs of appendicitis are identified.    There is no aneurysm of the moderately calcified abdominal aorta.    CT pelvis:    Urinary bladder is moderately distended.  Prior hysterectomy.  There is no soft tissue mass in the pelvis.    No acute osseous findings.                                 Medications   lactated ringers bolus 1,000 mL (0 mLs Intravenous Stopped 5/28/22 2139)   ondansetron injection 4 mg (4 mg Intravenous Given 5/28/22 2040)                Attending Attestation:           Physician Attestation for Scribe:  Physician Attestation Statement for Scribe #1: I, Jose Guadalupe Strickland MD, reviewed documentation, as scribed by Arianna Miller in my presence, and it is both accurate and complete.                      Clinical Impression:   Final diagnoses:  [K92.2] GI bleed  [K52.9] Colitis (Primary)          ED Disposition Condition    Discharge Stable        ED Prescriptions     Medication Sig Dispense Start Date End Date Auth. Provider    ciprofloxacin HCl (CIPRO) 500 MG tablet  (Status: Discontinued) Take 1 tablet (500 mg total) by mouth 2 (two) times daily. for 10 days 20 tablet 5/29/2022 5/29/2022 Jose Guadalupe Strickland MD    ciprofloxacin HCl (CIPRO) 500 MG tablet Take 1 tablet (500 mg total) by mouth 2 (two) times daily. for 10 days 20 tablet 5/29/2022 6/8/2022 Jose Guadalupe Strickland MD        Follow-up Information    None          Jose Guadalupe Strickland MD  05/30/22 [id number]

## 2022-05-30 LAB — UA COMPLETE W REFLEX CULTURE PNL UR: NORMAL

## 2022-06-07 ENCOUNTER — TELEPHONE (OUTPATIENT)
Dept: GASTROENTEROLOGY | Facility: CLINIC | Age: 77
End: 2022-06-07
Payer: MEDICARE

## 2022-06-07 NOTE — TELEPHONE ENCOUNTER
Returned call to patient about recent ER visit. Notified Johana Corbin NP. Chart reviewed and CT shows retained stool in colon. Recommends miralax daily. Attempted to give recommendations to patient but patient states she is at an appointment with her sister and could I call her back before end of clinic. Will call patient back with recommendations.s    ----- Message from Bibiana Doe sent at 6/7/2022  2:10 PM CDT -----  Re: ER visit needs to let Petrona know about.

## 2022-06-20 ENCOUNTER — INFUSION (OUTPATIENT)
Dept: INFUSION THERAPY | Facility: HOSPITAL | Age: 77
End: 2022-06-20
Attending: NURSE PRACTITIONER
Payer: MEDICARE

## 2022-06-20 VITALS
RESPIRATION RATE: 16 BRPM | BODY MASS INDEX: 27.05 KG/M2 | WEIGHT: 147 LBS | TEMPERATURE: 98 F | HEART RATE: 59 BPM | DIASTOLIC BLOOD PRESSURE: 80 MMHG | HEIGHT: 62 IN | SYSTOLIC BLOOD PRESSURE: 158 MMHG | OXYGEN SATURATION: 97 %

## 2022-06-20 DIAGNOSIS — K51.819 OTHER ULCERATIVE COLITIS WITH COMPLICATION: Primary | ICD-10-CM

## 2022-06-20 PROCEDURE — 25000003 PHARM REV CODE 250: Performed by: NURSE PRACTITIONER

## 2022-06-20 PROCEDURE — 96375 TX/PRO/DX INJ NEW DRUG ADDON: CPT

## 2022-06-20 PROCEDURE — 25000003 PHARM REV CODE 250

## 2022-06-20 PROCEDURE — 63600175 PHARM REV CODE 636 W HCPCS: Performed by: NURSE PRACTITIONER

## 2022-06-20 PROCEDURE — 63600175 PHARM REV CODE 636 W HCPCS

## 2022-06-20 PROCEDURE — 96365 THER/PROPH/DIAG IV INF INIT: CPT

## 2022-06-20 PROCEDURE — 96367 TX/PROPH/DG ADDL SEQ IV INF: CPT

## 2022-06-20 RX ORDER — SODIUM CHLORIDE 0.9 % (FLUSH) 0.9 %
10 SYRINGE (ML) INJECTION
Status: DISCONTINUED | OUTPATIENT
Start: 2022-06-20 | End: 2022-06-20 | Stop reason: HOSPADM

## 2022-06-20 RX ORDER — ACETAMINOPHEN 325 MG/1
650 TABLET ORAL DAILY
Status: DISCONTINUED | OUTPATIENT
Start: 2022-06-20 | End: 2022-06-20 | Stop reason: HOSPADM

## 2022-06-20 RX ORDER — DIPHENHYDRAMINE HYDROCHLORIDE 50 MG/ML
25 INJECTION INTRAMUSCULAR; INTRAVENOUS
Status: CANCELLED
Start: 2022-06-21

## 2022-06-20 RX ORDER — DIPHENHYDRAMINE HYDROCHLORIDE 50 MG/ML
25 INJECTION INTRAMUSCULAR; INTRAVENOUS
Status: DISCONTINUED | OUTPATIENT
Start: 2022-06-20 | End: 2022-06-20 | Stop reason: HOSPADM

## 2022-06-20 RX ORDER — SODIUM CHLORIDE 0.9 % (FLUSH) 0.9 %
10 SYRINGE (ML) INJECTION
Status: CANCELLED | OUTPATIENT
Start: 2022-06-21

## 2022-06-20 RX ORDER — ACETAMINOPHEN 325 MG/1
650 TABLET ORAL DAILY
Status: CANCELLED
Start: 2022-06-21

## 2022-06-20 RX ADMIN — ACETAMINOPHEN 650 MG: 325 TABLET ORAL at 01:06

## 2022-06-20 RX ADMIN — DIPHENHYDRAMINE HYDROCHLORIDE 12.5 MG: 50 INJECTION INTRAMUSCULAR; INTRAVENOUS at 01:06

## 2022-06-20 NOTE — PROGRESS NOTES
1245 pt here for infusion, daughter at side, TP released and pharmacy notifed, pre meds given and tolerated, pt resting in recliner, discussed starting at 125ml/hr this infusion because she has tolerated it at max rate after titrated the last 3 infusions.  Pt notified to let me know with any adverse reactions and will cont to monitor for adverse reactions.   1520 infusion complete, pt tolerated well, VSS remain stable,  pt up to restroom and states she is feeling fine.     1545 F/u appt made for 8 weeks. Discharged ambulatory with daughter at side.  Instructed to go to the ER with any adverse reactions.

## 2022-07-07 ENCOUNTER — HOSPITAL ENCOUNTER (OUTPATIENT)
Dept: RADIOLOGY | Facility: HOSPITAL | Age: 77
Discharge: HOME | End: 2022-07-07
Attending: NURSE PRACTITIONER
Payer: MEDICARE

## 2022-07-07 ENCOUNTER — OFFICE VISIT (OUTPATIENT)
Dept: GASTROENTEROLOGY | Facility: CLINIC | Age: 77
End: 2022-07-07
Payer: MEDICARE

## 2022-07-07 VITALS
OXYGEN SATURATION: 96 % | DIASTOLIC BLOOD PRESSURE: 43 MMHG | HEIGHT: 62 IN | HEART RATE: 61 BPM | BODY MASS INDEX: 27.64 KG/M2 | SYSTOLIC BLOOD PRESSURE: 154 MMHG | WEIGHT: 150.19 LBS

## 2022-07-07 DIAGNOSIS — K51.919 ULCERATIVE COLITIS WITH COMPLICATION, UNSPECIFIED LOCATION: ICD-10-CM

## 2022-07-07 DIAGNOSIS — K59.00 CONSTIPATION, UNSPECIFIED CONSTIPATION TYPE: Primary | ICD-10-CM

## 2022-07-07 DIAGNOSIS — K59.00 CONSTIPATION, UNSPECIFIED CONSTIPATION TYPE: ICD-10-CM

## 2022-07-07 DIAGNOSIS — K62.89 RECTAL PAIN: ICD-10-CM

## 2022-07-07 DIAGNOSIS — K62.5 RECTAL BLEEDING: Chronic | ICD-10-CM

## 2022-07-07 PROCEDURE — 99214 PR OFFICE/OUTPT VISIT, EST, LEVL IV, 30-39 MIN: ICD-10-PCS | Mod: ,,, | Performed by: NURSE PRACTITIONER

## 2022-07-07 PROCEDURE — 74018 RADEX ABDOMEN 1 VIEW: CPT | Mod: 26,,, | Performed by: RADIOLOGY

## 2022-07-07 PROCEDURE — 74018 RADEX ABDOMEN 1 VIEW: CPT | Mod: TC

## 2022-07-07 PROCEDURE — 99214 OFFICE O/P EST MOD 30 MIN: CPT | Mod: ,,, | Performed by: NURSE PRACTITIONER

## 2022-07-07 PROCEDURE — 74018 XR KUB: ICD-10-PCS | Mod: 26,,, | Performed by: RADIOLOGY

## 2022-07-07 RX ORDER — HYDROCORTISONE ACETATE 25 MG/1
25 SUPPOSITORY RECTAL 2 TIMES DAILY
Qty: 6 SUPPOSITORY | Refills: 0 | Status: SHIPPED | OUTPATIENT
Start: 2022-07-07 | End: 2022-07-10

## 2022-07-07 NOTE — PROGRESS NOTES
"Jose Russell is a 77 y.o. female here for Follow-up        PCP: Alyssa Michele  Referring Provider: No referring provider defined for this encounter.     HPI:  Presents for follow up UC. States that she is doing better but continues to have small amounts of brown liquid throughout the day. She states this is in addition to stool.Difficult to understand description of the bowel movements that she is reporting. Does have intermittent rectal bleeding but this has improved. She is taking Mesalamine. Request to decrease this if possible. Advised that if mesalamine is decreased at this time that she may have increased diarrhea and bleeding.Was seen in the Er on 5/28/22, mucosal thickening in the descending and sigmoid colon. Moderate retained stool. Patient states that stool in loose and that she does not feel constipated. Advised that at times liquid stool and go around constipation. Reports that if she takes Miralax regularly that this increases abdominal cramping. She is taking hyoscyamine prescribed by primary provider. Advised the patient this may increase constipation. Also report "rectal sores" due to frequent bowel movements. Over the counter medication is not working.        ROS:  Review of Systems   Constitutional: Negative for appetite change, fatigue, fever and unexpected weight change.   HENT: Negative for trouble swallowing.    Respiratory: Negative for shortness of breath and wheezing.    Cardiovascular: Negative for chest pain and palpitations.   Gastrointestinal: Positive for blood in stool, constipation and rectal pain. Negative for abdominal pain, change in bowel habit, diarrhea, nausea, vomiting, reflux and change in bowel habit.   Genitourinary: Negative for dysuria.   Musculoskeletal: Negative for gait problem.   Integumentary:  Negative for pallor.   Neurological: Negative for dizziness and light-headedness.   Psychiatric/Behavioral: The patient is not nervous/anxious.           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    PSHX:  has a past surgical history that includes Colonoscopy (06/12/2020); Esophagogastroduodenoscopy (05/24/2018); Hysterectomy; Cholecystectomy; and Nephrectomy.    PFHX: family history is not on file.    PSlHX:  reports that she has never smoked. She has never used smokeless tobacco. She reports that she does not drink alcohol and does not use drugs.        Review of patient's allergies indicates:   Allergen Reactions    Levofloxacin in d5w     Mercaptopurine analogues (thiopurines)     Penicillins     Sulfa (sulfonamide antibiotics)        Medication List with Changes/Refills   Current Medications    ALLOPURINOL (ZYLOPRIM) 100 MG TABLET    Take 200 mg by mouth once daily.    ASPIRIN (ECOTRIN) 81 MG EC TABLET    Take 81 mg by mouth once daily.    ATENOLOL (TENORMIN) 25 MG TABLET    Take 25 mg by mouth once daily.    AZITHROMYCIN (ZITHROMAX Z-AKHIL) 250 MG TABLET    Take two tablets today and then one tablet daily for four days    BIFIDOBACTERIUM INFANTIS (ALIGN ORAL)    Take by mouth once daily.    CITALOPRAM (CELEXA) 20 MG TABLET    Take 20 mg by mouth once daily.    CLOBETASOL (TEMOVATE) 0.05 % EXTERNAL SOLUTION    Apply to AA on scalp BID PRN flares    ESOMEPRAZOLE (NEXIUM) 40 MG CAPSULE    Take 40 mg by mouth once daily.    GLYBURIDE (DIABETA) 5 MG TABLET    Take 5 mg by mouth 2 (two) times daily with meals.    HYDRALAZINE (APRESOLINE) 50 MG TABLET    Take 50 mg by mouth once daily.    KETOCONAZOLE (NIZORAL) 2 % SHAMPOO    Use as a scalp treatment 2-3 times a week for maintenance, massaging into scalp and leaving on for up to 3 minutes before  "rinsing    LEVOTHYROXINE (SYNTHROID) 50 MCG TABLET    Take 50 mcg by mouth before breakfast.    MESALAMINE (ASACOL HD) 800 MG TBEC    Take 2 tablets (1,600 mg total) by mouth 3 (three) times daily.    OMEGA-3 FATTY ACIDS/FISH OIL (FISH OIL-OMEGA-3 FATTY ACIDS) 300-1,000 MG CAPSULE    Take by mouth once daily.    PRAVASTATIN (PRAVACHOL) 40 MG TABLET    Take 40 mg by mouth once daily.    SUCRALFATE (CARAFATE) 1 GRAM TABLET    Take 1 tablet (1 g total) by mouth 4 (four) times daily as needed (Epigastric pain).    TORSEMIDE (DEMADEX) 20 MG TAB    Take 10 mg by mouth once daily.    VERAPAMIL (VERELAN) 240 MG C24P    Take 240 mg by mouth once daily.        Objective Findings:  Vital Signs:  BP (!) 154/43   Pulse 61   Ht 5' 2" (1.575 m)   Wt 68.1 kg (150 lb 3.2 oz)   SpO2 96%   BMI 27.47 kg/m²  Body mass index is 27.47 kg/m².    Physical Exam:  Physical Exam  Vitals and nursing note reviewed.   Constitutional:       General: She is not in acute distress.     Appearance: Normal appearance. She is not ill-appearing.   HENT:      Mouth/Throat:      Mouth: Mucous membranes are moist.   Cardiovascular:      Rate and Rhythm: Normal rate.   Pulmonary:      Breath sounds: No wheezing, rhonchi or rales.   Abdominal:      General: Bowel sounds are normal. There is no distension.      Palpations: Abdomen is soft. There is no mass.      Tenderness: There is no abdominal tenderness. There is no guarding or rebound.      Hernia: No hernia is present.   Musculoskeletal:      Right lower leg: No edema.      Left lower leg: No edema.   Skin:     General: Skin is warm and dry.      Coloration: Skin is not jaundiced or pale.   Neurological:      Mental Status: She is alert and oriented to person, place, and time.   Psychiatric:         Mood and Affect: Mood normal.          Labs:  Lab Results   Component Value Date    WBC 6.53 07/07/2022    HGB 12.6 07/07/2022    HCT 37.9 (L) 07/07/2022    MCV 89.4 07/07/2022    RDW 14.2 07/07/2022    "  07/07/2022    LYMPH 25.0 (L) 07/07/2022    LYMPH 1.63 07/07/2022    MONO 8.4 (H) 07/07/2022    EOS 0.20 07/07/2022    BASO 0.04 07/07/2022     Lab Results   Component Value Date     05/28/2022    K 3.8 05/28/2022     05/28/2022    CO2 24 05/28/2022     (H) 05/28/2022    BUN 26 (H) 05/28/2022    CREATININE 1.15 (H) 05/28/2022    CALCIUM 10.0 05/28/2022    PROT 6.9 05/28/2022    ALBUMIN 3.8 05/28/2022    BILITOT 0.4 05/28/2022    ALKPHOS 120 05/28/2022    AST 56 (H) 05/28/2022    ALT 56 05/28/2022         Imaging: No results found.      Assessment:  Jose Russell is a 77 y.o. female here with:  1. Constipation, unspecified constipation type    2. Rectal bleeding    3. Ulcerative colitis with complication, unspecified location          Recommendations:  1. Continue Remicade and Mesalamine. Do not reduce Mesalamine dose  2. Avoid NSAID's  3. Miralax powder for constipation  4. Anusol HC suppositories    Follow up in about 6 weeks (around 8/18/2022).      Order summary:  Orders Placed This Encounter    X-Ray KUB    C-Reactive Protein    CBC Auto Differential       Thank you for allowing me to participate in the care of Jose Russell.      CHRIS Abdi

## 2022-07-08 ENCOUNTER — TELEPHONE (OUTPATIENT)
Dept: GASTROENTEROLOGY | Facility: CLINIC | Age: 77
End: 2022-07-08
Payer: MEDICARE

## 2022-07-08 NOTE — TELEPHONE ENCOUNTER
"Results and recommendations called to patient. Verbalized understanding.      ----- Message from MELANIA Noel sent at 7/8/2022  7:59 AM CDT -----  She has moderate to large amount of retained stool in the colon. I have discussed with Dr. Salcido. She must take a laxative to clean out colon. This may reduce the "teaspoon of liquid" that she is having that is so bothersome. Liquid is going around constipation. The hyoscyamine that she is taking for cramps will increase her constipation. I sent in Anusol suppository for rectal irritation. Also she should continue with current Remicade for one more infusion. If no improvement we will recommend changing biologic (Remicade) to something else.    "

## 2022-08-15 ENCOUNTER — INFUSION (OUTPATIENT)
Dept: INFUSION THERAPY | Facility: HOSPITAL | Age: 77
End: 2022-08-15
Attending: NURSE PRACTITIONER
Payer: MEDICARE

## 2022-08-15 VITALS
BODY MASS INDEX: 27.6 KG/M2 | OXYGEN SATURATION: 95 % | HEART RATE: 74 BPM | HEIGHT: 62 IN | SYSTOLIC BLOOD PRESSURE: 155 MMHG | RESPIRATION RATE: 16 BRPM | TEMPERATURE: 98 F | WEIGHT: 150 LBS | DIASTOLIC BLOOD PRESSURE: 47 MMHG

## 2022-08-15 DIAGNOSIS — K51.819 OTHER ULCERATIVE COLITIS WITH COMPLICATION: Primary | ICD-10-CM

## 2022-08-15 PROCEDURE — 25000003 PHARM REV CODE 250

## 2022-08-15 PROCEDURE — 96366 THER/PROPH/DIAG IV INF ADDON: CPT

## 2022-08-15 PROCEDURE — 96365 THER/PROPH/DIAG IV INF INIT: CPT

## 2022-08-15 PROCEDURE — 96375 TX/PRO/DX INJ NEW DRUG ADDON: CPT

## 2022-08-15 PROCEDURE — 63600175 PHARM REV CODE 636 W HCPCS

## 2022-08-15 PROCEDURE — 25000003 PHARM REV CODE 250: Performed by: NURSE PRACTITIONER

## 2022-08-15 PROCEDURE — 63600175 PHARM REV CODE 636 W HCPCS: Performed by: NURSE PRACTITIONER

## 2022-08-15 RX ORDER — SODIUM CHLORIDE 0.9 % (FLUSH) 0.9 %
10 SYRINGE (ML) INJECTION
Status: CANCELLED | OUTPATIENT
Start: 2022-08-16

## 2022-08-15 RX ORDER — DIPHENHYDRAMINE HYDROCHLORIDE 50 MG/ML
25 INJECTION INTRAMUSCULAR; INTRAVENOUS
Status: CANCELLED
Start: 2022-08-16

## 2022-08-15 RX ORDER — SODIUM CHLORIDE 0.9 % (FLUSH) 0.9 %
10 SYRINGE (ML) INJECTION
Status: DISCONTINUED | OUTPATIENT
Start: 2022-08-15 | End: 2022-08-15 | Stop reason: HOSPADM

## 2022-08-15 RX ORDER — DIPHENHYDRAMINE HYDROCHLORIDE 50 MG/ML
25 INJECTION INTRAMUSCULAR; INTRAVENOUS
Status: DISCONTINUED | OUTPATIENT
Start: 2022-08-15 | End: 2022-08-15 | Stop reason: HOSPADM

## 2022-08-15 RX ORDER — ACETAMINOPHEN 325 MG/1
650 TABLET ORAL DAILY
Status: DISCONTINUED | OUTPATIENT
Start: 2022-08-15 | End: 2022-08-15 | Stop reason: HOSPADM

## 2022-08-15 RX ORDER — ACETAMINOPHEN 325 MG/1
650 TABLET ORAL DAILY
Status: CANCELLED
Start: 2022-08-16

## 2022-08-15 RX ADMIN — DIPHENHYDRAMINE HYDROCHLORIDE 25 MG: 50 INJECTION, SOLUTION INTRAMUSCULAR; INTRAVENOUS at 12:08

## 2022-08-15 RX ADMIN — ACETAMINOPHEN 650 MG: 325 TABLET ORAL at 12:08

## 2022-08-15 NOTE — PROGRESS NOTES
1245 pt here for infusion, daughter at side, resting in recliner, TP released and pharmacy notified.  Pre meds given and tolerated.  1319 infusion started at 125ml/hr, will continue to monitor  1420 VS remain stable, pt states she is feeling good  1520 infusion complete and IV flushed   1540 no adverse reactions noted, pt f/u appt made, notified to go to ER with any problems, discharged ambulatory with daughter at side

## 2022-08-16 ENCOUNTER — OFFICE VISIT (OUTPATIENT)
Dept: GASTROENTEROLOGY | Facility: CLINIC | Age: 77
End: 2022-08-16
Payer: MEDICARE

## 2022-08-16 VITALS
WEIGHT: 154 LBS | SYSTOLIC BLOOD PRESSURE: 166 MMHG | DIASTOLIC BLOOD PRESSURE: 50 MMHG | OXYGEN SATURATION: 95 % | HEART RATE: 66 BPM | BODY MASS INDEX: 28.34 KG/M2 | HEIGHT: 62 IN

## 2022-08-16 DIAGNOSIS — K51.819 OTHER ULCERATIVE COLITIS WITH COMPLICATION: Primary | ICD-10-CM

## 2022-08-16 DIAGNOSIS — E61.1 IRON DEFICIENCY: ICD-10-CM

## 2022-08-16 DIAGNOSIS — K59.00 CONSTIPATION, UNSPECIFIED CONSTIPATION TYPE: ICD-10-CM

## 2022-08-16 DIAGNOSIS — K62.5 RECTAL BLEEDING: Chronic | ICD-10-CM

## 2022-08-16 DIAGNOSIS — R19.7 DIARRHEA, UNSPECIFIED TYPE: ICD-10-CM

## 2022-08-16 PROCEDURE — 99214 PR OFFICE/OUTPT VISIT, EST, LEVL IV, 30-39 MIN: ICD-10-PCS | Mod: ,,, | Performed by: NURSE PRACTITIONER

## 2022-08-16 PROCEDURE — 99214 OFFICE O/P EST MOD 30 MIN: CPT | Mod: ,,, | Performed by: NURSE PRACTITIONER

## 2022-08-16 RX ORDER — MESALAMINE 800 MG/1
1600 TABLET, DELAYED RELEASE ORAL 3 TIMES DAILY
Qty: 540 TABLET | Refills: 3 | Status: SHIPPED | OUTPATIENT
Start: 2022-08-16 | End: 2022-10-10 | Stop reason: SDUPTHER

## 2022-08-16 NOTE — PROGRESS NOTES
Jose Russell is a 77 y.o. female here for Follow-up        PCP: Alyssa Michele  Referring Provider: No referring provider defined for this encounter.     HPI:  Presents for follow up ulcerative colitis. Reports that she is 85 % better. Does continue to have some fecal urgency. Passes small amounts of liquid with mucus. Not always stool. Does have constipation. States that she is taking Miralax powder for constipation and that this has improved slightly. Last infusion of Remicade yesterday. Has been running a low grade temp for several days. Does have fatigue. Has had TRIPP. Is not taking oral iron due to constipation. Last colonoscopy 10/8/21, mild moderate active colitis in the rectum. Recommendation to repeat in 1 year.        ROS:  Review of Systems   Constitutional: Negative for appetite change, fatigue, fever and unexpected weight change.   HENT: Negative for trouble swallowing.    Respiratory: Negative for shortness of breath.    Cardiovascular: Negative for chest pain.   Gastrointestinal: Positive for abdominal pain (cramping), blood in stool, constipation and rectal pain. Negative for change in bowel habit, diarrhea, nausea, vomiting, reflux and change in bowel habit.   Genitourinary: Negative for dysuria.   Musculoskeletal: Negative for gait problem.   Integumentary:  Positive for pallor.   Neurological: Negative for dizziness and light-headedness.   Hematological: Does not bruise/bleed easily.   Psychiatric/Behavioral: The patient is not nervous/anxious.           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    PSHX:  has a past surgical history that includes Colonoscopy  (06/12/2020); Esophagogastroduodenoscopy (05/24/2018); Hysterectomy; Cholecystectomy; and Nephrectomy.    PFHX: family history is not on file.    PSlHX:  reports that she has never smoked. She has never used smokeless tobacco. She reports that she does not drink alcohol and does not use drugs.        Review of patient's allergies indicates:   Allergen Reactions    Levofloxacin in d5w     Mercaptopurine analogues (thiopurines)     Penicillins     Sulfa (sulfonamide antibiotics)        Medication List with Changes/Refills   Current Medications    ALLOPURINOL (ZYLOPRIM) 100 MG TABLET    Take 200 mg by mouth once daily.    ASPIRIN (ECOTRIN) 81 MG EC TABLET    Take 81 mg by mouth once daily.    ATENOLOL (TENORMIN) 25 MG TABLET    Take 25 mg by mouth once daily.    AZITHROMYCIN (ZITHROMAX Z-AKHIL) 250 MG TABLET    Take two tablets today and then one tablet daily for four days    BIFIDOBACTERIUM INFANTIS (ALIGN ORAL)    Take by mouth once daily.    CITALOPRAM (CELEXA) 20 MG TABLET    Take 20 mg by mouth once daily.    CLOBETASOL (TEMOVATE) 0.05 % EXTERNAL SOLUTION    Apply to AA on scalp BID PRN flares    ESOMEPRAZOLE (NEXIUM) 40 MG CAPSULE    Take 40 mg by mouth once daily.    GLYBURIDE (DIABETA) 5 MG TABLET    Take 5 mg by mouth 2 (two) times daily with meals.    HYDRALAZINE (APRESOLINE) 50 MG TABLET    Take 50 mg by mouth once daily.    KETOCONAZOLE (NIZORAL) 2 % SHAMPOO    Use as a scalp treatment 2-3 times a week for maintenance, massaging into scalp and leaving on for up to 3 minutes before rinsing    LEVOTHYROXINE (SYNTHROID) 50 MCG TABLET    Take 50 mcg by mouth before breakfast.    OMEGA-3 FATTY ACIDS/FISH OIL (FISH OIL-OMEGA-3 FATTY ACIDS) 300-1,000 MG CAPSULE    Take by mouth once daily.    PRAVASTATIN (PRAVACHOL) 40 MG TABLET    Take 40 mg by mouth once daily.    SUCRALFATE (CARAFATE) 1 GRAM TABLET    Take 1 tablet (1 g total) by mouth 4 (four) times daily as needed (Epigastric pain).    TORSEMIDE (DEMADEX)  "20 MG TAB    Take 10 mg by mouth once daily.    VERAPAMIL (VERELAN) 240 MG C24P    Take 240 mg by mouth once daily.   Changed and/or Refilled Medications    Modified Medication Previous Medication    MESALAMINE (ASACOL HD) 800 MG TBEC mesalamine (ASACOL HD) 800 mg TbEC       Take 2 tablets (1,600 mg total) by mouth 3 (three) times daily.    Take 2 tablets (1,600 mg total) by mouth 3 (three) times daily.        Objective Findings:  Vital Signs:  BP (!) 166/50   Pulse 66   Ht 5' 2" (1.575 m)   Wt 69.9 kg (154 lb)   SpO2 95%   BMI 28.17 kg/m²  Body mass index is 28.17 kg/m².    Physical Exam:  Physical Exam  Vitals and nursing note reviewed.   Constitutional:       General: She is not in acute distress.     Appearance: Normal appearance.   HENT:      Mouth/Throat:      Mouth: Mucous membranes are moist.   Cardiovascular:      Rate and Rhythm: Normal rate.   Pulmonary:      Effort: Pulmonary effort is normal.      Breath sounds: No wheezing, rhonchi or rales.   Abdominal:      General: Bowel sounds are normal. There is no distension.      Palpations: Abdomen is soft. There is no mass.      Tenderness: There is no abdominal tenderness. There is no guarding or rebound.      Hernia: No hernia is present.   Musculoskeletal:      Right lower leg: No edema.      Left lower leg: No edema.   Skin:     General: Skin is warm and dry.      Coloration: Skin is pale. Skin is not jaundiced.      Findings: No bruising or rash.   Neurological:      Mental Status: She is alert and oriented to person, place, and time.   Psychiatric:         Mood and Affect: Mood normal.          Labs:  Lab Results   Component Value Date    WBC 5.98 08/16/2022    HGB 11.1 (L) 08/16/2022    HCT 33.7 (L) 08/16/2022    MCV 91.8 08/16/2022    RDW 14.0 08/16/2022     08/16/2022    LYMPH 17.9 (L) 08/16/2022    LYMPH 1.07 08/16/2022    MONO 8.5 (H) 08/16/2022    EOS 0.14 08/16/2022    BASO 0.03 08/16/2022     Lab Results   Component Value Date    NA " 136 05/28/2022    K 3.8 05/28/2022     05/28/2022    CO2 24 05/28/2022     (H) 05/28/2022    BUN 26 (H) 05/28/2022    CREATININE 1.15 (H) 05/28/2022    CALCIUM 10.0 05/28/2022    PROT 6.9 05/28/2022    ALBUMIN 3.8 05/28/2022    BILITOT 0.4 05/28/2022    ALKPHOS 120 05/28/2022    AST 56 (H) 05/28/2022    ALT 56 05/28/2022         Imaging: No results found.      Assessment:  Jose Russell is a 77 y.o. female here with:  1. Other ulcerative colitis with complication    2. Iron deficiency    3. Rectal bleeding    4. Constipation, unspecified constipation type    5. Diarrhea, unspecified type          Recommendations:  1. Iron studies, CBC, CRP, Stool tests, will call with results of lab  2. Continue Mesalamine and Remicade  3. Avoid NSAID's  4. Continue Miralax  5. Send lab to .A Michele  6. Will discuss scheduling repeat colonoscopy at next visit    Follow up in about 3 months (around 11/16/2022).      Order summary:  Orders Placed This Encounter    C Diff Toxin by PCR    Enteric Pathogen Panel    Fecal leukocytes    CBC Auto Differential    Iron and TIBC    C-Reactive Protein    Calprotectin, Stool    Ferritin    mesalamine (ASACOL HD) 800 mg TbEC       Thank you for allowing me to participate in the care of Jose Russell.      CHRIS Abdi

## 2022-08-17 ENCOUNTER — TELEPHONE (OUTPATIENT)
Dept: GASTROENTEROLOGY | Facility: CLINIC | Age: 77
End: 2022-08-17
Payer: MEDICARE

## 2022-08-17 NOTE — TELEPHONE ENCOUNTER
Results called to patient. Verbalized understanding. Labs faxed to Alyssa Michele NP.        ----- Message from MELANIA Noel sent at 8/17/2022 12:56 PM CDT -----  CRP is slightly more elevated than previous lab. Ferritin is 209, normal, iron is normal. Mild anemia. Please send labs to CHRISTINA Michele

## 2022-10-05 ENCOUNTER — TELEPHONE (OUTPATIENT)
Dept: GASTROENTEROLOGY | Facility: CLINIC | Age: 77
End: 2022-10-05
Payer: MEDICARE

## 2022-10-05 NOTE — TELEPHONE ENCOUNTER
Attempted to return call. Left message.          ----- Message from Bibiana Doe sent at 10/5/2022  9:24 AM CDT -----  Re: rx issues , asked to talk to nurse

## 2022-10-10 ENCOUNTER — TELEPHONE (OUTPATIENT)
Dept: GASTROENTEROLOGY | Facility: CLINIC | Age: 77
End: 2022-10-10
Payer: MEDICARE

## 2022-10-10 ENCOUNTER — HOSPITAL ENCOUNTER (OUTPATIENT)
Dept: RADIOLOGY | Facility: HOSPITAL | Age: 77
Discharge: HOME | End: 2022-10-10
Attending: NURSE PRACTITIONER
Payer: MEDICARE

## 2022-10-10 ENCOUNTER — INFUSION (OUTPATIENT)
Dept: INFUSION THERAPY | Facility: HOSPITAL | Age: 77
End: 2022-10-10
Attending: NURSE PRACTITIONER
Payer: MEDICARE

## 2022-10-10 VITALS
OXYGEN SATURATION: 96 % | HEART RATE: 70 BPM | BODY MASS INDEX: 27.06 KG/M2 | WEIGHT: 147.06 LBS | SYSTOLIC BLOOD PRESSURE: 142 MMHG | TEMPERATURE: 98 F | HEIGHT: 62 IN | DIASTOLIC BLOOD PRESSURE: 49 MMHG | RESPIRATION RATE: 16 BRPM

## 2022-10-10 DIAGNOSIS — K51.819 OTHER ULCERATIVE COLITIS WITH COMPLICATION: Primary | ICD-10-CM

## 2022-10-10 DIAGNOSIS — K51.819 OTHER ULCERATIVE COLITIS WITH COMPLICATION: ICD-10-CM

## 2022-10-10 PROCEDURE — 74018 XR KUB: ICD-10-PCS | Mod: 26,,, | Performed by: RADIOLOGY

## 2022-10-10 PROCEDURE — 96365 THER/PROPH/DIAG IV INF INIT: CPT

## 2022-10-10 PROCEDURE — 96366 THER/PROPH/DIAG IV INF ADDON: CPT

## 2022-10-10 PROCEDURE — 74018 RADEX ABDOMEN 1 VIEW: CPT | Mod: 26,,, | Performed by: RADIOLOGY

## 2022-10-10 PROCEDURE — 25000003 PHARM REV CODE 250

## 2022-10-10 PROCEDURE — 74018 RADEX ABDOMEN 1 VIEW: CPT | Mod: TC

## 2022-10-10 PROCEDURE — 63600175 PHARM REV CODE 636 W HCPCS: Performed by: NURSE PRACTITIONER

## 2022-10-10 PROCEDURE — 63600175 PHARM REV CODE 636 W HCPCS: Mod: JG

## 2022-10-10 PROCEDURE — 25000003 PHARM REV CODE 250: Performed by: NURSE PRACTITIONER

## 2022-10-10 RX ORDER — ACETAMINOPHEN 325 MG/1
650 TABLET ORAL DAILY
Status: CANCELLED
Start: 2022-10-11

## 2022-10-10 RX ORDER — DIPHENHYDRAMINE HYDROCHLORIDE 50 MG/ML
25 INJECTION INTRAMUSCULAR; INTRAVENOUS
Status: DISCONTINUED | OUTPATIENT
Start: 2022-10-10 | End: 2022-10-10 | Stop reason: HOSPADM

## 2022-10-10 RX ORDER — ACETAMINOPHEN 325 MG/1
650 TABLET ORAL DAILY
Status: DISCONTINUED | OUTPATIENT
Start: 2022-10-10 | End: 2022-10-10 | Stop reason: HOSPADM

## 2022-10-10 RX ORDER — MESALAMINE 800 MG/1
1600 TABLET, DELAYED RELEASE ORAL 3 TIMES DAILY
Qty: 540 TABLET | Refills: 3 | Status: SHIPPED | OUTPATIENT
Start: 2022-10-10 | End: 2023-03-09 | Stop reason: SDUPTHER

## 2022-10-10 RX ORDER — DIPHENHYDRAMINE HYDROCHLORIDE 50 MG/ML
25 INJECTION INTRAMUSCULAR; INTRAVENOUS
Status: CANCELLED
Start: 2022-10-11

## 2022-10-10 RX ORDER — SODIUM CHLORIDE 0.9 % (FLUSH) 0.9 %
10 SYRINGE (ML) INJECTION
Status: DISCONTINUED | OUTPATIENT
Start: 2022-10-10 | End: 2022-10-10 | Stop reason: HOSPADM

## 2022-10-10 RX ORDER — SODIUM CHLORIDE 0.9 % (FLUSH) 0.9 %
10 SYRINGE (ML) INJECTION
Status: CANCELLED | OUTPATIENT
Start: 2022-10-11

## 2022-10-10 RX ADMIN — DIPHENHYDRAMINE HYDROCHLORIDE 25 MG: 50 INJECTION, SOLUTION INTRAMUSCULAR; INTRAVENOUS at 12:10

## 2022-10-10 RX ADMIN — ACETAMINOPHEN 650 MG: 325 TABLET ORAL at 12:10

## 2022-10-10 NOTE — PROGRESS NOTES
1200 pt here for Remicade infusion, daughter at side, states feeling bad between infusions, bad episodes of diarrhea and cramping, nausea and 99.0 temp last night  Today VS stable, Petrona Corbin NP called to notified of symptoms and ok to proceed with infusion.  Pt is ok with this.  Pre meds given and tolerated  1250 Remicade Infusion started at 125ml/hr with filter   1350 pt resting with eyes closed, pt VS stable  1455 Remicade infusion complete  F/u appt made for 8 weeks  1520 pt tolerated infusion well, no signs of adverse reactions, notified to go to ER with any problems.  Pt discharged ambulatory with daughter at side.

## 2022-10-10 NOTE — TELEPHONE ENCOUNTER
Results and recommendations called to patient. Verbalized understanding.              ----- Message from MELANIA Noel sent at 10/10/2022  3:59 PM CDT -----  She is constipated. Is she taking the Miralax?

## 2022-10-12 ENCOUNTER — TELEPHONE (OUTPATIENT)
Dept: GASTROENTEROLOGY | Facility: CLINIC | Age: 77
End: 2022-10-12
Payer: MEDICARE

## 2022-10-12 NOTE — TELEPHONE ENCOUNTER
Results and recommendations called to patient. Agreeable to flex sig. Will have  call to schedule.              ----- Message from MELANIA Noel sent at 10/12/2022 12:45 PM CDT -----  CRP has increased since the last lab. Iron is normal. Hgb 12.3 and Hct 37.9. Ferritin is elevated and may indicate inflammation. Discussed with Dr. Salcido. Recommend flex sig to evaluate colitis. If she agrees, I will place the order.

## 2022-10-20 DIAGNOSIS — K51.919 ULCERATIVE COLITIS WITH COMPLICATION, UNSPECIFIED LOCATION: Primary | ICD-10-CM

## 2022-11-15 ENCOUNTER — HOSPITAL ENCOUNTER (OUTPATIENT)
Dept: GASTROENTEROLOGY | Facility: HOSPITAL | Age: 77
Discharge: HOME | End: 2022-11-15
Attending: NURSE PRACTITIONER
Payer: MEDICARE

## 2022-11-15 ENCOUNTER — ANESTHESIA (OUTPATIENT)
Dept: GASTROENTEROLOGY | Facility: HOSPITAL | Age: 77
End: 2022-11-15
Payer: MEDICARE

## 2022-11-15 ENCOUNTER — ANESTHESIA EVENT (OUTPATIENT)
Dept: GASTROENTEROLOGY | Facility: HOSPITAL | Age: 77
End: 2022-11-15
Payer: MEDICARE

## 2022-11-15 VITALS
DIASTOLIC BLOOD PRESSURE: 61 MMHG | HEART RATE: 82 BPM | TEMPERATURE: 98 F | OXYGEN SATURATION: 96 % | RESPIRATION RATE: 16 BRPM | SYSTOLIC BLOOD PRESSURE: 120 MMHG

## 2022-11-15 DIAGNOSIS — K51.919 ULCERATIVE COLITIS WITH COMPLICATION, UNSPECIFIED LOCATION: ICD-10-CM

## 2022-11-15 DIAGNOSIS — K57.30 DIVERTICULOSIS OF COLON: Primary | ICD-10-CM

## 2022-11-15 PROCEDURE — D9220A PRA ANESTHESIA: Mod: ,,, | Performed by: NURSE ANESTHETIST, CERTIFIED REGISTERED

## 2022-11-15 PROCEDURE — 37000008 HC ANESTHESIA 1ST 15 MINUTES

## 2022-11-15 PROCEDURE — 88305 TISSUE EXAM BY PATHOLOGIST: CPT | Mod: SUR | Performed by: INTERNAL MEDICINE

## 2022-11-15 PROCEDURE — 88305 SURGICAL PATHOLOGY: ICD-10-PCS | Mod: 26,,, | Performed by: PATHOLOGY

## 2022-11-15 PROCEDURE — 27000284 HC CANNULA NASAL: Performed by: NURSE ANESTHETIST, CERTIFIED REGISTERED

## 2022-11-15 PROCEDURE — D9220A PRA ANESTHESIA: ICD-10-PCS | Mod: ,,, | Performed by: NURSE ANESTHETIST, CERTIFIED REGISTERED

## 2022-11-15 PROCEDURE — 88305 TISSUE EXAM BY PATHOLOGIST: CPT | Mod: 26,,, | Performed by: PATHOLOGY

## 2022-11-15 PROCEDURE — 25000003 PHARM REV CODE 250: Performed by: NURSE ANESTHETIST, CERTIFIED REGISTERED

## 2022-11-15 PROCEDURE — 45331 SIGMOIDOSCOPY AND BIOPSY: CPT

## 2022-11-15 PROCEDURE — 27201423 OPTIME MED/SURG SUP & DEVICES STERILE SUPPLY

## 2022-11-15 PROCEDURE — 88342 IMHCHEM/IMCYTCHM 1ST ANTB: CPT | Mod: 26,,, | Performed by: PATHOLOGY

## 2022-11-15 PROCEDURE — 88342 SURGICAL PATHOLOGY: ICD-10-PCS | Mod: 26,,, | Performed by: PATHOLOGY

## 2022-11-15 PROCEDURE — 63600175 PHARM REV CODE 636 W HCPCS: Performed by: NURSE ANESTHETIST, CERTIFIED REGISTERED

## 2022-11-15 RX ORDER — LIDOCAINE HYDROCHLORIDE 20 MG/ML
INJECTION, SOLUTION EPIDURAL; INFILTRATION; INTRACAUDAL; PERINEURAL
Status: DISCONTINUED | OUTPATIENT
Start: 2022-11-15 | End: 2022-11-15

## 2022-11-15 RX ORDER — SODIUM CHLORIDE 0.9 % (FLUSH) 0.9 %
10 SYRINGE (ML) INJECTION
Status: CANCELLED | OUTPATIENT
Start: 2022-11-15

## 2022-11-15 RX ORDER — PROPOFOL 10 MG/ML
VIAL (ML) INTRAVENOUS
Status: DISCONTINUED | OUTPATIENT
Start: 2022-11-15 | End: 2022-11-15

## 2022-11-15 RX ADMIN — SODIUM CHLORIDE: 9 INJECTION, SOLUTION INTRAVENOUS at 12:11

## 2022-11-15 RX ADMIN — PROPOFOL 50 MG: 10 INJECTION, EMULSION INTRAVENOUS at 12:11

## 2022-11-15 RX ADMIN — PROPOFOL 100 MG: 10 INJECTION, EMULSION INTRAVENOUS at 12:11

## 2022-11-15 RX ADMIN — LIDOCAINE HYDROCHLORIDE 50 MG: 20 INJECTION, SOLUTION INTRAVENOUS at 12:11

## 2022-11-15 NOTE — TRANSFER OF CARE
"Anesthesia Transfer of Care Note    Patient: Jose Russell    Procedure(s) Performed: * No procedures listed *    Patient location: GI    Anesthesia Type: general    Transport from OR: Transported from OR on room air with adequate spontaneous ventilation. Continuous ECG monitoring in transport. Continuous SpO2 monitoring in transport    Post pain: adequate analgesia    Post assessment: no apparent anesthetic complications and tolerated procedure well    Post vital signs: stable    Level of consciousness: responds to stimulation    Nausea/Vomiting: no nausea/vomiting    Complications: none    Transfer of care protocol was followed      Last vitals:   Visit Vitals  BP (!) 105/38 (BP Location: Right arm, Patient Position: Lying)   Pulse (!) 56   Temp 36.7 °C (98 °F)   Resp 15   Ht (P) 5' 2" (1.575 m)   Wt (P) 68 kg (150 lb)   SpO2 98%   Breastfeeding No   BMI (P) 27.44 kg/m²     "

## 2022-11-15 NOTE — ANESTHESIA PREPROCEDURE EVALUATION
11/15/2022  Jose Russell is a 77 y.o., female.      Pre-op Assessment    I have reviewed the Patient Summary Reports.     I have reviewed the Nursing Notes. I have reviewed the NPO Status.   I have reviewed the Medications.     Review of Systems  Cardiovascular:   Hypertension    Pulmonary:   Asthma    Renal/:   Chronic Renal Disease, CRI    Hepatic/GI:   PUD, Hiatal Hernia, GERD, poorly controlled    Endocrine:   Diabetes, type 2        Physical Exam  General: Well nourished, Cooperative, Alert and Oriented    Airway:  Mallampati: II   Mouth Opening: Normal  TM Distance: Normal  Tongue: Normal  Neck ROM: Normal ROM        Anesthesia Plan  Type of Anesthesia, risks & benefits discussed:    Anesthesia Type: Gen Natural Airway  Intra-op Monitoring Plan: Standard ASA Monitors  Post Op Pain Control Plan: multimodal analgesia  Induction:  IV  Airway Plan: , Awake  Informed Consent: Informed consent signed with the Patient and all parties understand the risks and agree with anesthesia plan.  All questions answered. Patient consented to blood products? Yes  ASA Score: 3  Day of Surgery Review of History & Physical: H&P Update referred to the surgeon/provider.    Ready For Surgery From Anesthesia Perspective.     .

## 2022-11-15 NOTE — DISCHARGE INSTRUCTIONS
Procedure Date  11/15/22     Impression  Overall Impression: Diverticula are present in the sigmoid and descending colon. The rectum has inflammation and ulceration. One distal rectal polypoid nodule was biopsied.     Recommendation    Await pathology results      Avoid nsaids; change from Remicade to Entyvio infusions 300mg IV week 0,2,6, then every 8 weeks at [facility].    Return to GI clinic in 2 months. - January 16, 2023 @1:00 PM    Disp: DC to home in stable condition. Resume diet and activity, no driving x 24 hours. F/U as above. Dx: Ulcerative proctitis, colon diverticulosis.    No driving today, no operating heavy machinery, no signing any legal documents until tomorrow.    Drink lots of fluids, resume regular diet.  Take your normal medications.

## 2022-11-15 NOTE — H&P
[facility]  Gastroenterology  H&P    Patient Name: Jose Russell  MRN: [id number]  Admission Date: 11/15/2022  Code Status: Prior    Attending Provider: MELANIA Noel   Primary Care Physician: MELANIA Love  Principal Problem:<principal problem not specified>    Subjective:     History of Present Illness: Pt has UC and fecal incontinence, on Remicade and mesalamine; for Flex Sig exam.    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       Past Surgical History:   Procedure Laterality Date    CHOLECYSTECTOMY      COLONOSCOPY  06/12/2020    repeat in 3 years    ESOPHAGOGASTRODUODENOSCOPY  05/24/2018    HYSTERECTOMY      NEPHRECTOMY         Review of patient's allergies indicates:   Allergen Reactions    Levofloxacin in d5w     Mercaptopurine analogues (thiopurines)     Penicillins     Sulfa (sulfonamide antibiotics)      Family History    None       Tobacco Use    Smoking status: Never    Smokeless tobacco: Never   Substance and Sexual Activity    Alcohol use: Never    Drug use: Never    Sexual activity: Not on file     Review of Systems   Respiratory: Negative.     Cardiovascular: Negative.    Gastrointestinal:  Positive for diarrhea.   Objective:     Vital Signs (Most Recent):  Pulse: 84 (11/15/22 1219)  Resp: 20 (11/15/22 1219)  BP: (!) 150/64 (11/15/22 1219)  SpO2: 95 % (11/15/22 1219)   Vital Signs (24h Range):  Pulse:  [84] 84  Resp:  [20] 20  SpO2:  [95 %] 95 %  BP: (150)/(64) 150/64     Weight: (P) 68 kg (150 lb) (11/15/22 1209)  Body mass index is 27.44 kg/m² (pended).    No intake or output data in the 24 hours ending 11/15/22  1251    Lines/Drains/Airways       Peripheral Intravenous Line  Duration                  Peripheral IV - Single Lumen 11/15/22 1222 22 G Anterior;Left Forearm <1 day                    Physical Exam  Vitals reviewed.   Constitutional:       General: She is not in acute distress.     Appearance: Normal appearance. She is well-developed. She is not ill-appearing.   HENT:      Head: Normocephalic and atraumatic.      Nose: Nose normal.   Eyes:      Pupils: Pupils are equal, round, and reactive to light.   Cardiovascular:      Rate and Rhythm: Normal rate and regular rhythm.   Pulmonary:      Effort: Pulmonary effort is normal.      Breath sounds: Normal breath sounds. No wheezing.   Abdominal:      General: Abdomen is flat. Bowel sounds are normal. There is no distension.      Palpations: Abdomen is soft.      Tenderness: There is no abdominal tenderness. There is no guarding.   Skin:     General: Skin is warm and dry.      Coloration: Skin is not jaundiced.   Neurological:      Mental Status: She is alert.   Psychiatric:         Attention and Perception: Attention normal.         Mood and Affect: Affect normal.         Speech: Speech normal.         Behavior: Behavior is cooperative.      Comments: Pt was calm while speaking.       Significant Labs:  CBC: No results for input(s): WBC, HGB, HCT, PLT in the last 48 hours.  CMP: No results for input(s): GLU, CALCIUM, ALBUMIN, PROT, NA, K, CO2, CL, BUN, CREATININE, ALKPHOS, ALT, AST, BILITOT in the last 48 hours.    Significant Imaging:  Imaging results within the past 24 hours have been reviewed.    Assessment/Plan:     There are no hospital problems to display for this patient.        Imp: UC  Plan: FFS    Adi Salcido MD  Gastroenterology  [facility]

## 2022-11-18 ENCOUNTER — OFFICE VISIT (OUTPATIENT)
Dept: GASTROENTEROLOGY | Facility: CLINIC | Age: 77
End: 2022-11-18
Payer: MEDICARE

## 2022-11-18 VITALS
WEIGHT: 149 LBS | HEIGHT: 62 IN | DIASTOLIC BLOOD PRESSURE: 44 MMHG | BODY MASS INDEX: 27.42 KG/M2 | OXYGEN SATURATION: 95 % | HEART RATE: 67 BPM | SYSTOLIC BLOOD PRESSURE: 135 MMHG

## 2022-11-18 DIAGNOSIS — K21.9 GASTROESOPHAGEAL REFLUX DISEASE, UNSPECIFIED WHETHER ESOPHAGITIS PRESENT: ICD-10-CM

## 2022-11-18 DIAGNOSIS — E61.1 IRON DEFICIENCY: ICD-10-CM

## 2022-11-18 DIAGNOSIS — K51.919 ULCERATIVE COLITIS WITH COMPLICATION, UNSPECIFIED LOCATION: Primary | ICD-10-CM

## 2022-11-18 PROCEDURE — 99214 OFFICE O/P EST MOD 30 MIN: CPT | Mod: ,,, | Performed by: NURSE PRACTITIONER

## 2022-11-18 PROCEDURE — 99214 PR OFFICE/OUTPT VISIT, EST, LEVL IV, 30-39 MIN: ICD-10-PCS | Mod: ,,, | Performed by: NURSE PRACTITIONER

## 2022-11-18 NOTE — PROGRESS NOTES
Jose Russell is a 77 y.o. female here for Follow-up        PCP: Alyssa Michele  Referring Provider: No referring provider defined for this encounter.     HPI:  Presents for follow up after Flex sigmoidoscopy on 11/15/22. Rectum, biopsy:  Polypoid, acutely inflamed, granulation tissue. Patient has been receiving Remicade infusions every 8 weeks without improvement of symptoms. Continues to have rectal discomfort, hematochezia, and fecal urgency. Stool continues to be loose. Reports over 6 bowel movements per day at times. No nausea or vomiting. Appetite is decreased. No further weight loss. No recent fever. Does have chronic iron deficiency. She last received an iron infusion at [facility].Iron levels normal in Oct. CRP was increased at 2.04.    Follow-up  Pertinent negatives include no abdominal pain, change in bowel habit, chest pain, fatigue, fever, joint swelling, nausea or vomiting.       ROS:  Review of Systems   Constitutional:  Negative for appetite change, fatigue, fever and unexpected weight change.   HENT:  Negative for trouble swallowing.    Respiratory:  Negative for shortness of breath.    Cardiovascular:  Negative for chest pain.   Gastrointestinal:  Positive for diarrhea. Negative for abdominal pain, blood in stool, change in bowel habit, constipation, nausea, vomiting, reflux and change in bowel habit.   Musculoskeletal:  Negative for back pain, gait problem and joint swelling.   Integumentary:  Negative for pallor.   Neurological:  Negative for dizziness and light-headedness.   Psychiatric/Behavioral:  The patient is not nervous/anxious.         PMHX:  has a past medical history of Acute superficial gastritis without hemorrhage (05/18/2021), Asthma, Diabetes mellitus, Diverticula, colon (10/08/2021), HH (hiatus hernia) (05/18/2021), Hypertension, Iron deficiency anemia due to chronic blood loss (4/1/2022), Renal disorder, Thyroid disease, Transfusion reaction, Ulcerative colitis, and  Ulcerative proctitis (10/08/2021).    PSHX:  has a past surgical history that includes Colonoscopy (06/12/2020); Esophagogastroduodenoscopy (05/24/2018); Hysterectomy; Cholecystectomy; and Nephrectomy.    PFHX: family history is not on file.    PSlHX:  reports that she has never smoked. She has never used smokeless tobacco. She reports that she does not drink alcohol and does not use drugs.        Review of patient's allergies indicates:   Allergen Reactions    Levofloxacin in d5w     Mercaptopurine analogues (thiopurines)     Penicillins     Sulfa (sulfonamide antibiotics)        Medication List with Changes/Refills   Current Medications    ALLOPURINOL (ZYLOPRIM) 100 MG TABLET    Take 200 mg by mouth once daily.    ASPIRIN (ECOTRIN) 81 MG EC TABLET    Take 81 mg by mouth once daily.    ATENOLOL (TENORMIN) 25 MG TABLET    Take 25 mg by mouth once daily.    AZITHROMYCIN (ZITHROMAX Z-AKHIL) 250 MG TABLET    Take two tablets today and then one tablet daily for four days    BIFIDOBACTERIUM INFANTIS (ALIGN ORAL)    Take by mouth once daily.    CITALOPRAM (CELEXA) 20 MG TABLET    Take 20 mg by mouth once daily.    CLOBETASOL (TEMOVATE) 0.05 % EXTERNAL SOLUTION    Apply to AA on scalp BID PRN flares    ESOMEPRAZOLE (NEXIUM) 40 MG CAPSULE    Take 40 mg by mouth once daily.    GLYBURIDE (DIABETA) 5 MG TABLET    Take 5 mg by mouth 2 (two) times daily with meals.    HYDRALAZINE (APRESOLINE) 50 MG TABLET    Take 50 mg by mouth once daily.    KETOCONAZOLE (NIZORAL) 2 % SHAMPOO    Use as a scalp treatment 2-3 times a week for maintenance, massaging into scalp and leaving on for up to 3 minutes before rinsing    LEVOTHYROXINE (SYNTHROID) 50 MCG TABLET    Take 50 mcg by mouth before breakfast.    MESALAMINE (ASACOL HD) 800 MG TBEC    Take 2 tablets (1,600 mg total) by mouth 3 (three) times daily.    OMEGA-3 FATTY ACIDS/FISH OIL (FISH OIL-OMEGA-3 FATTY ACIDS) 300-1,000 MG CAPSULE    Take by mouth once daily.    PRAVASTATIN (PRAVACHOL)  "40 MG TABLET    Take 40 mg by mouth once daily.    SUCRALFATE (CARAFATE) 1 GRAM TABLET    Take 1 tablet (1 g total) by mouth 4 (four) times daily as needed (Epigastric pain).    TORSEMIDE (DEMADEX) 20 MG TAB    Take 10 mg by mouth once daily.    VERAPAMIL (VERELAN) 240 MG C24P    Take 240 mg by mouth once daily.        Objective Findings:  Vital Signs:  BP (!) 135/44   Pulse 67   Ht 5' 2" (1.575 m)   Wt 67.6 kg (149 lb)   SpO2 95%   BMI 27.25 kg/m²  Body mass index is 27.25 kg/m².    Physical Exam:  Physical Exam  Vitals and nursing note reviewed.   Constitutional:       General: She is not in acute distress.     Appearance: Normal appearance.   HENT:      Mouth/Throat:      Mouth: Mucous membranes are moist.   Cardiovascular:      Rate and Rhythm: Normal rate.   Pulmonary:      Effort: Pulmonary effort is normal.      Breath sounds: No wheezing, rhonchi or rales.   Abdominal:      General: Bowel sounds are normal. There is no distension.      Palpations: Abdomen is soft. There is no mass.      Tenderness: There is no abdominal tenderness. There is no guarding.   Skin:     General: Skin is warm and dry.      Coloration: Skin is not jaundiced or pale.   Neurological:      Mental Status: She is alert and oriented to person, place, and time.   Psychiatric:         Mood and Affect: Mood normal.        Labs:  Lab Results   Component Value Date    WBC 7.46 10/10/2022    HGB 12.3 10/10/2022    HCT 37.9 (L) 10/10/2022    MCV 93.6 10/10/2022    RDW 14.0 10/10/2022     10/10/2022    LYMPH 16.8 (L) 10/10/2022    LYMPH 1.25 10/10/2022    MONO 9.5 (H) 10/10/2022    EOS 0.28 10/10/2022    BASO 0.03 10/10/2022     Lab Results   Component Value Date     10/10/2022    K 4.4 10/10/2022     10/10/2022    CO2 28 10/10/2022     (H) 10/10/2022    BUN 24 (H) 10/10/2022    CREATININE 1.05 (H) 10/10/2022    CALCIUM 9.7 10/10/2022    PROT 6.9 10/10/2022    ALBUMIN 3.4 (L) 10/10/2022    BILITOT 0.2 10/10/2022    " ALKPHOS 165 (H) 10/10/2022    AST 22 10/10/2022    ALT 26 10/10/2022         Imaging: SIGMOIDOSCOPY, FLEXIBLE    Result Date: 11/15/2022  Table formatting from the original result was not included. Procedure Date 11/15/22 Impression Overall      Diverticula are present in the sigmoid and descending colon. The rectum has inflammation and ulceration. One distal rectal polypoid nodule was biopsied. Recommendation  Await pathology results Avoid nsaids; change from Remicade to Entyvio infusions 300mg IV week 0,2,6, then every 8 weeks at [facility]. Return to GI clinic in 2 months. Disp: DC to home in stable condition. Resume diet and activity, no driving x 24 hours. F/U as above. Dx: Ulcerative proctitis, colon diverticulosis. Indication Ulcerative colitis with complication, unspecified location Providers Marjorie Escalera Technician Valarie Sorensen, FANTASMA Galvez, RN Registered Nurse Adi Salcido MD Proceduralist Medications Moderate sedation administered by anesthesia staff - See anesthesia record. Preprocedure A history and physical has been performed, and patient medication allergies have been reviewed. The patient's tolerance of previous anesthesia has been reviewed. The risks and benefits of the procedure and the sedation options and risks were discussed with the patient. All questions were answered and informed consent obtained. ASA Score: ASA 2 - Patient with mild systemic disease with no functional limitations Mallampati Airway Score: II (hard and soft palate, upper portion of tonsils anduvula visible) Details of the Procedure The patient underwent monitored anesthesia care, which was administered by an anesthesia professional. The patient's blood pressure, ECG, ETCO2, heart rate, level of consciousness, oxygen and respirations were monitored throughout the procedure. A digital rectal exam was performed. The scope was introduced through the anus and advanced to the descending  colon. Retroflexion was performed in the rectum. The quality of bowel preparation was evaluated using the [place] Bowel Preparation Scale with scores of: right colon = not assessed, transverse colon = not assessed, left colon = 2. The patient's estimated blood loss was minimal (<5 mL). The procedure was not difficult. The patient tolerated the procedure well. There were no apparent complications. The total BBPS score was 2. Scope: Pediatric Colonoscope Scope Serial: [id number] Events Procedure Events Event Event Time Procedure Events Event Event Time SCOPE IN 11/15/2022 12:54 PM SCOPE OUT 11/15/2022 12:58 PM Findings Performed forceps biopsies in the rectum Diverticula in the descending colon and sigmoid colon         Assessment:  Jose Russell is a 77 y.o. female here with:  1. Ulcerative colitis with complication, unspecified location    2. Iron deficiency    3. Gastroesophageal reflux disease, unspecified whether esophagitis present          Recommendations:  1. Labs below today  2. Continue ASACOL. Avoid NSAID's  3. Will start Entyvio following lab return  4. Next Remicade infusion is Dec 5, will stop if Remicade if Entyvio is approved    Follow up in about 4 weeks (around 12/16/2022).      Order summary:  Orders Placed This Encounter    Hepatitis B Surface Antigen    Hepatitis C Antibody    HIV 1/2 Ag/Ab (4th Gen)    Quantiferon Gold TB    Hepatitis B Core Antibody, IgM       Thank you for allowing me to participate in the care of Jose Russell.      CHRIS Abdi

## 2022-11-18 NOTE — ANESTHESIA POSTPROCEDURE EVALUATION
Anesthesia Post Evaluation    Patient: Jose Russell    Procedure(s) Performed: * No procedures listed *    Final Anesthesia Type: general      Patient location during evaluation: GI PACU  Patient participation: Yes- Able to Participate  Level of consciousness: awake and alert  Post-procedure vital signs: reviewed and stable  Pain management: adequate  Airway patency: patent    PONV status at discharge: No PONV  Anesthetic complications: no      Cardiovascular status: stable  Respiratory status: unassisted, spontaneous ventilation and room air  Hydration status: euvolemic  Follow-up not needed.          Vitals Value Taken Time   /61 11/15/22 1320   Temp 98 11/18/22 1002   Pulse 80 11/15/22 1324   Resp 16 11/15/22 1324   SpO2 97 % 11/15/22 1324   Vitals shown include unvalidated device data.      Event Time   Out of Recovery 13:49:31         Pain/Sri Score: No data recorded

## 2022-11-22 ENCOUNTER — TELEPHONE (OUTPATIENT)
Dept: GASTROENTEROLOGY | Facility: CLINIC | Age: 77
End: 2022-11-22
Payer: MEDICARE

## 2022-11-22 RX ORDER — ACETAMINOPHEN 325 MG/1
650 TABLET ORAL
Status: CANCELLED | OUTPATIENT
Start: 2022-12-19

## 2022-11-22 RX ORDER — IPRATROPIUM BROMIDE AND ALBUTEROL SULFATE 2.5; .5 MG/3ML; MG/3ML
3 SOLUTION RESPIRATORY (INHALATION)
Status: CANCELLED | OUTPATIENT
Start: 2022-12-19

## 2022-11-22 RX ORDER — DIPHENHYDRAMINE HYDROCHLORIDE 50 MG/ML
25 INJECTION INTRAMUSCULAR; INTRAVENOUS
Status: CANCELLED | OUTPATIENT
Start: 2022-12-19

## 2022-11-22 RX ORDER — SODIUM CHLORIDE 0.9 % (FLUSH) 0.9 %
10 SYRINGE (ML) INJECTION
Status: CANCELLED | OUTPATIENT
Start: 2022-12-19

## 2022-11-22 RX ORDER — ACETAMINOPHEN 325 MG/1
650 TABLET ORAL ONCE
Status: CANCELLED | OUTPATIENT
Start: 2022-12-19 | End: 2022-12-19

## 2022-11-22 RX ORDER — EPINEPHRINE 1 MG/ML
0.3 INJECTION, SOLUTION, CONCENTRATE INTRAVENOUS
Status: CANCELLED | OUTPATIENT
Start: 2022-12-19

## 2022-11-22 RX ORDER — METHYLPREDNISOLONE SOD SUCC 125 MG
40 VIAL (EA) INJECTION
Status: CANCELLED | OUTPATIENT
Start: 2022-12-19

## 2022-11-22 NOTE — TELEPHONE ENCOUNTER
Results and recommendations called to patient. Patient also states someone tried to call her yesterday. Pathology results given. Verbalized understanding            ----- Message from MELANIA Noel sent at 11/22/2022  9:16 AM CST -----  HIV and Hepatitis labs are negative. Entyvio is ordered. If precert is not complete by the next Remicade infusion. She should continue to get the Remicade dose until precert is complete. Please advise the patient.

## 2022-12-05 ENCOUNTER — INFUSION (OUTPATIENT)
Dept: INFUSION THERAPY | Facility: HOSPITAL | Age: 77
End: 2022-12-05
Attending: NURSE PRACTITIONER
Payer: MEDICARE

## 2022-12-05 VITALS
SYSTOLIC BLOOD PRESSURE: 142 MMHG | RESPIRATION RATE: 17 BRPM | HEART RATE: 61 BPM | TEMPERATURE: 98 F | DIASTOLIC BLOOD PRESSURE: 65 MMHG

## 2022-12-05 DIAGNOSIS — K51.919 ULCERATIVE COLITIS WITH COMPLICATION, UNSPECIFIED LOCATION: Primary | ICD-10-CM

## 2022-12-05 PROCEDURE — 96374 THER/PROPH/DIAG INJ IV PUSH: CPT

## 2022-12-05 PROCEDURE — 25000003 PHARM REV CODE 250: Performed by: NURSE PRACTITIONER

## 2022-12-05 PROCEDURE — 96365 THER/PROPH/DIAG IV INF INIT: CPT

## 2022-12-05 PROCEDURE — 63600175 PHARM REV CODE 636 W HCPCS: Performed by: NURSE PRACTITIONER

## 2022-12-05 PROCEDURE — 96375 TX/PRO/DX INJ NEW DRUG ADDON: CPT

## 2022-12-05 RX ORDER — ACETAMINOPHEN 325 MG/1
650 TABLET ORAL ONCE
Status: COMPLETED | OUTPATIENT
Start: 2022-12-05 | End: 2022-12-05

## 2022-12-05 RX ORDER — DIPHENHYDRAMINE HYDROCHLORIDE 50 MG/ML
25 INJECTION INTRAMUSCULAR; INTRAVENOUS
Status: CANCELLED | OUTPATIENT
Start: 2023-01-24

## 2022-12-05 RX ORDER — SODIUM CHLORIDE 0.9 % (FLUSH) 0.9 %
10 SYRINGE (ML) INJECTION
Status: DISCONTINUED | OUTPATIENT
Start: 2022-12-05 | End: 2022-12-05 | Stop reason: HOSPADM

## 2022-12-05 RX ORDER — ACETAMINOPHEN 325 MG/1
650 TABLET ORAL ONCE
Status: CANCELLED | OUTPATIENT
Start: 2023-01-24 | End: 2023-01-24

## 2022-12-05 RX ORDER — DIPHENHYDRAMINE HYDROCHLORIDE 50 MG/ML
25 INJECTION INTRAMUSCULAR; INTRAVENOUS ONCE
Status: COMPLETED | OUTPATIENT
Start: 2022-12-05 | End: 2022-12-05

## 2022-12-05 RX ORDER — METHYLPREDNISOLONE SOD SUCC 125 MG
40 VIAL (EA) INJECTION
Status: CANCELLED | OUTPATIENT
Start: 2023-01-24

## 2022-12-05 RX ORDER — ACETAMINOPHEN 325 MG/1
650 TABLET ORAL
Status: CANCELLED | OUTPATIENT
Start: 2023-01-24

## 2022-12-05 RX ORDER — IPRATROPIUM BROMIDE AND ALBUTEROL SULFATE 2.5; .5 MG/3ML; MG/3ML
3 SOLUTION RESPIRATORY (INHALATION)
Status: CANCELLED | OUTPATIENT
Start: 2023-01-24

## 2022-12-05 RX ORDER — EPINEPHRINE 1 MG/ML
0.3 INJECTION, SOLUTION, CONCENTRATE INTRAVENOUS
Status: CANCELLED | OUTPATIENT
Start: 2023-01-24

## 2022-12-05 RX ORDER — SODIUM CHLORIDE 0.9 % (FLUSH) 0.9 %
10 SYRINGE (ML) INJECTION
Status: CANCELLED | OUTPATIENT
Start: 2023-01-24

## 2022-12-05 RX ADMIN — ACETAMINOPHEN 650 MG: 325 TABLET ORAL at 12:12

## 2022-12-05 RX ADMIN — DIPHENHYDRAMINE HYDROCHLORIDE 25 MG: 50 INJECTION INTRAMUSCULAR; INTRAVENOUS at 12:12

## 2022-12-05 RX ADMIN — VEDOLIZUMAB 300 MG: 300 INJECTION, POWDER, LYOPHILIZED, FOR SOLUTION INTRAVENOUS at 12:12

## 2022-12-05 NOTE — PROGRESS NOTES
1200 pt here for Entyvio infusion, This is pts first infusion with Entyvio recent Remicade pt.  TP released and pharmacy notified   Pre meds given and tolerated  1238 Entyvio started to go in over 30 min  1315 Entyvio infusion complete, will continue to monitor, follow up appt made for 2 weeks

## 2022-12-19 ENCOUNTER — INFUSION (OUTPATIENT)
Dept: INFUSION THERAPY | Facility: HOSPITAL | Age: 77
End: 2022-12-19
Attending: NURSE PRACTITIONER
Payer: MEDICARE

## 2022-12-19 VITALS
DIASTOLIC BLOOD PRESSURE: 54 MMHG | HEART RATE: 71 BPM | SYSTOLIC BLOOD PRESSURE: 152 MMHG | TEMPERATURE: 98 F | RESPIRATION RATE: 18 BRPM

## 2022-12-19 DIAGNOSIS — K51.919 ULCERATIVE COLITIS WITH COMPLICATION, UNSPECIFIED LOCATION: Primary | ICD-10-CM

## 2022-12-19 PROCEDURE — 96365 THER/PROPH/DIAG IV INF INIT: CPT

## 2022-12-19 PROCEDURE — 63600175 PHARM REV CODE 636 W HCPCS: Mod: JG | Performed by: NURSE PRACTITIONER

## 2022-12-19 PROCEDURE — 25000003 PHARM REV CODE 250: Performed by: NURSE PRACTITIONER

## 2022-12-19 RX ORDER — EPINEPHRINE 1 MG/ML
0.3 INJECTION, SOLUTION, CONCENTRATE INTRAVENOUS
Status: CANCELLED | OUTPATIENT
Start: 2023-01-24

## 2022-12-19 RX ORDER — SODIUM CHLORIDE 0.9 % (FLUSH) 0.9 %
10 SYRINGE (ML) INJECTION
Status: DISCONTINUED | OUTPATIENT
Start: 2022-12-19 | End: 2022-12-19 | Stop reason: HOSPADM

## 2022-12-19 RX ORDER — ACETAMINOPHEN 325 MG/1
650 TABLET ORAL
Status: CANCELLED | OUTPATIENT
Start: 2023-01-24

## 2022-12-19 RX ORDER — ACETAMINOPHEN 325 MG/1
650 TABLET ORAL ONCE
Status: CANCELLED | OUTPATIENT
Start: 2023-01-24 | End: 2023-01-24

## 2022-12-19 RX ORDER — ACETAMINOPHEN 325 MG/1
650 TABLET ORAL ONCE
Status: COMPLETED | OUTPATIENT
Start: 2022-12-19 | End: 2022-12-19

## 2022-12-19 RX ORDER — IPRATROPIUM BROMIDE AND ALBUTEROL SULFATE 2.5; .5 MG/3ML; MG/3ML
3 SOLUTION RESPIRATORY (INHALATION)
Status: CANCELLED | OUTPATIENT
Start: 2023-01-24

## 2022-12-19 RX ORDER — METHYLPREDNISOLONE SOD SUCC 125 MG
40 VIAL (EA) INJECTION
Status: CANCELLED | OUTPATIENT
Start: 2023-01-24

## 2022-12-19 RX ORDER — DIPHENHYDRAMINE HYDROCHLORIDE 50 MG/ML
25 INJECTION INTRAMUSCULAR; INTRAVENOUS ONCE
Status: COMPLETED | OUTPATIENT
Start: 2022-12-19 | End: 2022-12-19

## 2022-12-19 RX ORDER — SODIUM CHLORIDE 0.9 % (FLUSH) 0.9 %
10 SYRINGE (ML) INJECTION
Status: CANCELLED | OUTPATIENT
Start: 2023-01-24

## 2022-12-19 RX ORDER — DIPHENHYDRAMINE HYDROCHLORIDE 50 MG/ML
25 INJECTION INTRAMUSCULAR; INTRAVENOUS
Status: CANCELLED | OUTPATIENT
Start: 2023-01-24

## 2022-12-19 RX ADMIN — DIPHENHYDRAMINE HYDROCHLORIDE 25 MG: 50 INJECTION INTRAMUSCULAR; INTRAVENOUS at 12:12

## 2022-12-19 RX ADMIN — VEDOLIZUMAB 300 MG: 300 INJECTION, POWDER, LYOPHILIZED, FOR SOLUTION INTRAVENOUS at 12:12

## 2022-12-19 RX ADMIN — ACETAMINOPHEN 650 MG: 325 TABLET ORAL at 12:12

## 2022-12-27 ENCOUNTER — TELEPHONE (OUTPATIENT)
Dept: GASTROENTEROLOGY | Facility: CLINIC | Age: 77
End: 2022-12-27
Payer: MEDICARE

## 2022-12-27 NOTE — TELEPHONE ENCOUNTER
Returned call to patient after speaking with Johana Corbin NP. Informed patient that Johana Corbin NP does not think that Entyvio would be causing the palpitation, and to increase her water intake and follow up with her PCP MELANIA Olivia. Patient verbalized understanding.

## 2022-12-27 NOTE — TELEPHONE ENCOUNTER
Attempted to return call. Left message.            ----- Message from Bibiana Doe sent at 12/27/2022 11:24 AM CST -----  Re: recent infusion made her feel bad wanted to talk to nurse.

## 2022-12-27 NOTE — TELEPHONE ENCOUNTER
Patient called me back. States after her second infusion of Entyvio, she has been feeling like her heart was racing. Patient called off several BP readings she had taken, only one heart rate, of 89 reported. BP readings ok. Informed patient I would notify Johana Corbin NP and get back to her. Patient verbalized understanding.

## 2023-01-17 ENCOUNTER — TELEPHONE (OUTPATIENT)
Dept: GASTROENTEROLOGY | Facility: CLINIC | Age: 78
End: 2023-01-17
Payer: MEDICARE

## 2023-01-17 NOTE — TELEPHONE ENCOUNTER
Patient daughter called states that patient has noticed for about 3-4 days that she has had tenderness in her rectal area and not able to sit down, and noticed a white, puss like area when looking with mirror. Informed daughter that I would speak with Johana Corbin NP and let her know. Verbalized understanding.

## 2023-01-17 NOTE — TELEPHONE ENCOUNTER
Returned call to patient daughter after speaking with Johana Corbin NP. Informed that Johana Corbin NP states she should see her PCP for this, that it could be an abcess, but anything external GI does not see. Patient daughter verbalized understanding.

## 2023-01-23 ENCOUNTER — INFUSION (OUTPATIENT)
Dept: INFUSION THERAPY | Facility: HOSPITAL | Age: 78
End: 2023-01-23
Attending: NURSE PRACTITIONER
Payer: MEDICARE

## 2023-01-23 VITALS
SYSTOLIC BLOOD PRESSURE: 125 MMHG | HEART RATE: 70 BPM | TEMPERATURE: 98 F | DIASTOLIC BLOOD PRESSURE: 53 MMHG | OXYGEN SATURATION: 97 % | RESPIRATION RATE: 17 BRPM

## 2023-01-23 DIAGNOSIS — K51.919 ULCERATIVE COLITIS WITH COMPLICATION, UNSPECIFIED LOCATION: Primary | ICD-10-CM

## 2023-01-23 PROCEDURE — 96374 THER/PROPH/DIAG INJ IV PUSH: CPT

## 2023-01-23 PROCEDURE — 96365 THER/PROPH/DIAG IV INF INIT: CPT

## 2023-01-23 PROCEDURE — 96375 TX/PRO/DX INJ NEW DRUG ADDON: CPT

## 2023-01-23 PROCEDURE — 25000003 PHARM REV CODE 250: Performed by: NURSE PRACTITIONER

## 2023-01-23 PROCEDURE — 63600175 PHARM REV CODE 636 W HCPCS: Mod: JG | Performed by: NURSE PRACTITIONER

## 2023-01-23 RX ORDER — ACETAMINOPHEN 325 MG/1
650 TABLET ORAL
Status: CANCELLED | OUTPATIENT
Start: 2023-01-24

## 2023-01-23 RX ORDER — IPRATROPIUM BROMIDE AND ALBUTEROL SULFATE 2.5; .5 MG/3ML; MG/3ML
3 SOLUTION RESPIRATORY (INHALATION)
Status: CANCELLED | OUTPATIENT
Start: 2023-01-24

## 2023-01-23 RX ORDER — EPINEPHRINE 1 MG/ML
0.3 INJECTION, SOLUTION, CONCENTRATE INTRAVENOUS
Status: CANCELLED | OUTPATIENT
Start: 2023-01-24

## 2023-01-23 RX ORDER — METHYLPREDNISOLONE SOD SUCC 125 MG
40 VIAL (EA) INJECTION
Status: CANCELLED | OUTPATIENT
Start: 2023-01-24

## 2023-01-23 RX ORDER — SODIUM CHLORIDE 0.9 % (FLUSH) 0.9 %
10 SYRINGE (ML) INJECTION
Status: CANCELLED | OUTPATIENT
Start: 2023-01-24

## 2023-01-23 RX ORDER — DIPHENHYDRAMINE HYDROCHLORIDE 50 MG/ML
25 INJECTION INTRAMUSCULAR; INTRAVENOUS
Status: COMPLETED | OUTPATIENT
Start: 2023-01-23 | End: 2023-01-23

## 2023-01-23 RX ORDER — SODIUM CHLORIDE 0.9 % (FLUSH) 0.9 %
10 SYRINGE (ML) INJECTION
Status: DISCONTINUED | OUTPATIENT
Start: 2023-01-23 | End: 2023-01-23 | Stop reason: HOSPADM

## 2023-01-23 RX ORDER — DIPHENHYDRAMINE HYDROCHLORIDE 50 MG/ML
25 INJECTION INTRAMUSCULAR; INTRAVENOUS
Status: CANCELLED | OUTPATIENT
Start: 2023-01-24

## 2023-01-23 RX ORDER — ACETAMINOPHEN 325 MG/1
650 TABLET ORAL ONCE
Status: COMPLETED | OUTPATIENT
Start: 2023-01-23 | End: 2023-01-23

## 2023-01-23 RX ORDER — ACETAMINOPHEN 325 MG/1
650 TABLET ORAL ONCE
Status: CANCELLED | OUTPATIENT
Start: 2023-01-24 | End: 2023-01-24

## 2023-01-23 RX ADMIN — VEDOLIZUMAB 300 MG: 300 INJECTION, POWDER, LYOPHILIZED, FOR SOLUTION INTRAVENOUS at 12:01

## 2023-01-23 RX ADMIN — ACETAMINOPHEN 650 MG: 325 TABLET ORAL at 12:01

## 2023-01-23 RX ADMIN — DIPHENHYDRAMINE HYDROCHLORIDE 25 MG: 50 INJECTION INTRAMUSCULAR; INTRAVENOUS at 12:01

## 2023-01-23 NOTE — PROGRESS NOTES
1220 Pt here for Entyvio infusion, resting in recliner, TP released and pharmacy notified   Pre meds given and tolerated   1238 Entyvio infusion started  1315 Entyvio infusion complete, tolerated well, will continue to monitor  Follow up appt made for 8 weeks  1345 pt discharged ambulatory with no adverse reaction noted, pt notified to go to ER with any adverse reactions, AVS given

## 2023-03-09 ENCOUNTER — OFFICE VISIT (OUTPATIENT)
Dept: GASTROENTEROLOGY | Facility: CLINIC | Age: 78
End: 2023-03-09
Payer: MEDICARE

## 2023-03-09 VITALS
OXYGEN SATURATION: 96 % | HEART RATE: 59 BPM | SYSTOLIC BLOOD PRESSURE: 140 MMHG | BODY MASS INDEX: 26.57 KG/M2 | WEIGHT: 144.38 LBS | DIASTOLIC BLOOD PRESSURE: 45 MMHG | HEIGHT: 62 IN

## 2023-03-09 DIAGNOSIS — K21.9 GASTROESOPHAGEAL REFLUX DISEASE, UNSPECIFIED WHETHER ESOPHAGITIS PRESENT: ICD-10-CM

## 2023-03-09 DIAGNOSIS — K51.819 OTHER ULCERATIVE COLITIS WITH COMPLICATION: ICD-10-CM

## 2023-03-09 DIAGNOSIS — D50.0 IRON DEFICIENCY ANEMIA DUE TO CHRONIC BLOOD LOSS: ICD-10-CM

## 2023-03-09 DIAGNOSIS — K51.919 ULCERATIVE COLITIS WITH COMPLICATION, UNSPECIFIED LOCATION: Primary | ICD-10-CM

## 2023-03-09 PROCEDURE — 99214 PR OFFICE/OUTPT VISIT, EST, LEVL IV, 30-39 MIN: ICD-10-PCS | Mod: S$PBB,,, | Performed by: NURSE PRACTITIONER

## 2023-03-09 PROCEDURE — 99215 OFFICE O/P EST HI 40 MIN: CPT | Mod: PBBFAC | Performed by: NURSE PRACTITIONER

## 2023-03-09 PROCEDURE — 99214 OFFICE O/P EST MOD 30 MIN: CPT | Mod: S$PBB,,, | Performed by: NURSE PRACTITIONER

## 2023-03-09 RX ORDER — MESALAMINE 800 MG/1
1600 TABLET, DELAYED RELEASE ORAL 2 TIMES DAILY
Qty: 360 TABLET | Refills: 3 | Status: SHIPPED | OUTPATIENT
Start: 2023-03-09 | End: 2023-06-20 | Stop reason: SDUPTHER

## 2023-03-09 RX ORDER — LANOLIN ALCOHOL/MO/W.PET/CERES
1 CREAM (GRAM) TOPICAL
COMMUNITY
Start: 2023-01-24

## 2023-03-09 NOTE — PROGRESS NOTES
Jose Russell is a 77 y.o. female here for Follow-up        PCP: Alyssa Michele  Referring Provider: Alyssa Michele, Fnp  [address] [place] [address],  [place] [id number]     HPI:  Presents for follow up appointment due to UC. Patient is now receiving Entyvio infusions. Reports that she is doing much better. No rectal bleeding. She is taking Mag Ox and this has improved constipation as well. Appetite is improved. No weight loss. Endorses joint pain and pain in the right hip. Was evaluated by BARBARA Michele last week and did get labs drawn. Last iron infusion was received at [facility]. Discussed turning in a stool for calprotectin. She also reports that she voluntarily reduced the Asacol to 4 daily. Even with reduction of Asacol she has not had a change in symptoms. She has a a bone density at [facility] but is not sure of the date.    Follow-up  Associated symptoms include arthralgias. Pertinent negatives include no abdominal pain, change in bowel habit, chest pain, fatigue, fever, joint swelling, nausea or vomiting.       ROS:  Review of Systems   Constitutional:  Negative for appetite change, fatigue, fever and unexpected weight change.   HENT:  Negative for trouble swallowing.    Respiratory:  Negative for shortness of breath.    Cardiovascular:  Negative for chest pain.   Gastrointestinal:  Positive for constipation (improved). Negative for abdominal pain, blood in stool, change in bowel habit, diarrhea, nausea, vomiting, reflux and change in bowel habit.   Genitourinary:  Negative for dysuria.   Musculoskeletal:  Positive for arthralgias. Negative for back pain, gait problem and joint swelling.   Integumentary:  Positive for pallor.   Neurological:  Negative for light-headedness.   Hematological:  Does not bruise/bleed easily.   Psychiatric/Behavioral:  The patient is not nervous/anxious.         PMHX:  has a past medical history of Acute superficial gastritis without hemorrhage (05/18/2021), Asthma, Diabetes mellitus,  Diverticula, colon (10/08/2021), HH (hiatus hernia) (05/18/2021), Hypertension, Iron deficiency anemia due to chronic blood loss (4/1/2022), Renal disorder, Thyroid disease, Transfusion reaction, Ulcerative colitis, and Ulcerative proctitis (10/08/2021).    PSHX:  has a past surgical history that includes Colonoscopy (06/12/2020); Esophagogastroduodenoscopy (05/24/2018); Hysterectomy; Cholecystectomy; and Nephrectomy.    PFHX: family history is not on file.    PSlHX:  reports that she has never smoked. She has never used smokeless tobacco. She reports that she does not drink alcohol and does not use drugs.        Review of patient's allergies indicates:   Allergen Reactions    Levofloxacin in d5w     Mercaptopurine analogues (thiopurines)     Penicillins     Sulfa (sulfonamide antibiotics)        Medication List with Changes/Refills   Current Medications    ALLOPURINOL (ZYLOPRIM) 100 MG TABLET    Take 200 mg by mouth once daily.    ASPIRIN (ECOTRIN) 81 MG EC TABLET    Take 81 mg by mouth once daily.    ATENOLOL (TENORMIN) 25 MG TABLET    Take 25 mg by mouth once daily.    AZITHROMYCIN (ZITHROMAX Z-AKHIL) 250 MG TABLET    Take two tablets today and then one tablet daily for four days    BIFIDOBACTERIUM INFANTIS (ALIGN ORAL)    Take by mouth once daily.    CITALOPRAM (CELEXA) 20 MG TABLET    Take 20 mg by mouth once daily.    CLOBETASOL (TEMOVATE) 0.05 % EXTERNAL SOLUTION    Apply to AA on scalp BID PRN flares    ESOMEPRAZOLE (NEXIUM) 40 MG CAPSULE    Take 40 mg by mouth once daily.    GLYBURIDE (DIABETA) 5 MG TABLET    Take 5 mg by mouth 2 (two) times daily with meals.    HYDRALAZINE (APRESOLINE) 50 MG TABLET    Take 50 mg by mouth once daily.    KETOCONAZOLE (NIZORAL) 2 % SHAMPOO    Use as a scalp treatment 2-3 times a week for maintenance, massaging into scalp and leaving on for up to 3 minutes before rinsing    LEVOTHYROXINE (SYNTHROID) 50 MCG TABLET    Take 50 mcg by mouth before breakfast.    MAGNESIUM OXIDE  "(MAG-OX) 400 MG (241.3 MG MAGNESIUM) TABLET    Take 1 tablet by mouth.    OMEGA-3 FATTY ACIDS/FISH OIL (FISH OIL-OMEGA-3 FATTY ACIDS) 300-1,000 MG CAPSULE    Take by mouth once daily.    PRAVASTATIN (PRAVACHOL) 40 MG TABLET    Take 40 mg by mouth once daily.    SUCRALFATE (CARAFATE) 1 GRAM TABLET    Take 1 tablet (1 g total) by mouth 4 (four) times daily as needed (Epigastric pain).    TORSEMIDE (DEMADEX) 20 MG TAB    Take 10 mg by mouth once daily.    VERAPAMIL (VERELAN) 240 MG C24P    Take 240 mg by mouth once daily.   Changed and/or Refilled Medications    Modified Medication Previous Medication    MESALAMINE (ASACOL HD) 800 MG TBEC mesalamine (ASACOL HD) 800 mg TbEC       Take 2 tablets (1,600 mg total) by mouth 2 (two) times a day.    Take 2 tablets (1,600 mg total) by mouth 3 (three) times daily.        Objective Findings:  Vital Signs:  BP (!) 140/45   Pulse (!) 59   Ht 5' 2" (1.575 m)   Wt 65.5 kg (144 lb 6.4 oz)   SpO2 96%   BMI 26.41 kg/m²  Body mass index is 26.41 kg/m².    Physical Exam:  Physical Exam  Vitals and nursing note reviewed.   Constitutional:       General: She is not in acute distress.     Appearance: Normal appearance.   HENT:      Mouth/Throat:      Mouth: Mucous membranes are moist.   Cardiovascular:      Rate and Rhythm: Bradycardia present.   Pulmonary:      Effort: Pulmonary effort is normal.      Breath sounds: No wheezing, rhonchi or rales.   Abdominal:      General: Bowel sounds are normal. There is no distension.      Palpations: Abdomen is soft. There is no mass.      Tenderness: There is no abdominal tenderness. There is no guarding.   Musculoskeletal:      Right lower leg: No edema.      Left lower leg: No edema.   Skin:     General: Skin is warm and dry.      Coloration: Skin is pale. Skin is not jaundiced.   Neurological:      Mental Status: She is alert and oriented to person, place, and time.   Psychiatric:         Mood and Affect: Mood normal.        Labs:  Lab Results "   Component Value Date    WBC 7.46 10/10/2022    HGB 12.3 10/10/2022    HCT 37.9 (L) 10/10/2022    MCV 93.6 10/10/2022    RDW 14.0 10/10/2022     10/10/2022    LYMPH 16.8 (L) 10/10/2022    LYMPH 1.25 10/10/2022    MONO 9.5 (H) 10/10/2022    EOS 0.28 10/10/2022    BASO 0.03 10/10/2022     Lab Results   Component Value Date     10/10/2022    K 4.4 10/10/2022     10/10/2022    CO2 28 10/10/2022     (H) 10/10/2022    BUN 24 (H) 10/10/2022    CREATININE 1.05 (H) 10/10/2022    CALCIUM 9.7 10/10/2022    PROT 6.9 10/10/2022    ALBUMIN 3.4 (L) 10/10/2022    BILITOT 0.2 10/10/2022    ALKPHOS 165 (H) 10/10/2022    AST 22 10/10/2022    ALT 26 10/10/2022         Imaging: No results found.      Assessment:  Jose Russell is a 77 y.o. female here with:  1. Ulcerative colitis with complication, unspecified location    2. Iron deficiency anemia due to chronic blood loss    3. Gastroesophageal reflux disease, unspecified whether esophagitis present    4. Other ulcerative colitis with complication          Recommendations:  1. Continue Entyvio infusions as prescribed  2. Request labs from M. Michele's office  3. Request last bone density from M. Michele's office  4. Asacol 800 mg, 2 twice daily    Follow up in about 3 months (around 6/9/2023).      Order summary:  Orders Placed This Encounter    mesalamine (ASACOL HD) 800 mg TbEC       Thank you for allowing me to participate in the care of Jose Russell.      CHRIS Abdi

## 2023-03-20 ENCOUNTER — INFUSION (OUTPATIENT)
Dept: INFUSION THERAPY | Facility: HOSPITAL | Age: 78
End: 2023-03-20
Attending: NURSE PRACTITIONER
Payer: MEDICARE

## 2023-03-20 VITALS
DIASTOLIC BLOOD PRESSURE: 56 MMHG | HEART RATE: 66 BPM | TEMPERATURE: 98 F | RESPIRATION RATE: 17 BRPM | OXYGEN SATURATION: 97 % | SYSTOLIC BLOOD PRESSURE: 134 MMHG

## 2023-03-20 DIAGNOSIS — K51.919 ULCERATIVE COLITIS WITH COMPLICATION, UNSPECIFIED LOCATION: Primary | ICD-10-CM

## 2023-03-20 PROCEDURE — 25000003 PHARM REV CODE 250: Performed by: NURSE PRACTITIONER

## 2023-03-20 PROCEDURE — 96365 THER/PROPH/DIAG IV INF INIT: CPT

## 2023-03-20 PROCEDURE — 96375 TX/PRO/DX INJ NEW DRUG ADDON: CPT

## 2023-03-20 PROCEDURE — 63600175 PHARM REV CODE 636 W HCPCS: Performed by: NURSE PRACTITIONER

## 2023-03-20 RX ORDER — EPINEPHRINE 1 MG/ML
0.3 INJECTION, SOLUTION, CONCENTRATE INTRAVENOUS
Status: CANCELLED | OUTPATIENT
Start: 2023-05-15

## 2023-03-20 RX ORDER — ACETAMINOPHEN 325 MG/1
650 TABLET ORAL ONCE
Status: COMPLETED | OUTPATIENT
Start: 2023-03-20 | End: 2023-03-20

## 2023-03-20 RX ORDER — METHYLPREDNISOLONE SOD SUCC 125 MG
40 VIAL (EA) INJECTION
Status: CANCELLED | OUTPATIENT
Start: 2023-05-15

## 2023-03-20 RX ORDER — ACETAMINOPHEN 325 MG/1
650 TABLET ORAL
Status: CANCELLED | OUTPATIENT
Start: 2023-05-15

## 2023-03-20 RX ORDER — ACETAMINOPHEN 325 MG/1
650 TABLET ORAL ONCE
Status: CANCELLED | OUTPATIENT
Start: 2023-05-15 | End: 2023-05-15

## 2023-03-20 RX ORDER — SODIUM CHLORIDE 0.9 % (FLUSH) 0.9 %
10 SYRINGE (ML) INJECTION
Status: CANCELLED | OUTPATIENT
Start: 2023-05-15

## 2023-03-20 RX ORDER — IPRATROPIUM BROMIDE AND ALBUTEROL SULFATE 2.5; .5 MG/3ML; MG/3ML
3 SOLUTION RESPIRATORY (INHALATION)
Status: CANCELLED | OUTPATIENT
Start: 2023-05-15

## 2023-03-20 RX ORDER — SODIUM CHLORIDE 0.9 % (FLUSH) 0.9 %
10 SYRINGE (ML) INJECTION
Status: DISCONTINUED | OUTPATIENT
Start: 2023-03-20 | End: 2023-03-20 | Stop reason: HOSPADM

## 2023-03-20 RX ORDER — DIPHENHYDRAMINE HYDROCHLORIDE 50 MG/ML
25 INJECTION INTRAMUSCULAR; INTRAVENOUS
Status: CANCELLED | OUTPATIENT
Start: 2023-05-15

## 2023-03-20 RX ADMIN — VEDOLIZUMAB 300 MG: 300 INJECTION, POWDER, LYOPHILIZED, FOR SOLUTION INTRAVENOUS at 12:03

## 2023-03-20 RX ADMIN — ACETAMINOPHEN 650 MG: 325 TABLET ORAL at 12:03

## 2023-03-20 RX ADMIN — DIPHENHYDRAMINE HYDROCHLORIDE 25 MG: 50 INJECTION INTRAMUSCULAR; INTRAVENOUS at 12:03

## 2023-03-20 NOTE — PROGRESS NOTES
1220 Pt here for Entyvio infusion, resting in recliner, TP released and pharmacy notified   Pre meds given and tolerated  1244 Entyvio infusion started to go in over 30 min  1318 Entyvio infusion complete, tolerated well, will continue to monitor  Follow up appt made for 8 weeks  1330 pt discharged ambulatory, daughter at side, with no adverse reaction noted, pt notified to go to ER with any adverse reactions, AVS given

## 2023-03-27 ENCOUNTER — HOSPITAL ENCOUNTER (EMERGENCY)
Facility: HOSPITAL | Age: 78
Discharge: HOME | End: 2023-03-27
Attending: EMERGENCY MEDICINE
Payer: MEDICARE

## 2023-03-27 VITALS
SYSTOLIC BLOOD PRESSURE: 144 MMHG | WEIGHT: 143 LBS | HEIGHT: 62 IN | DIASTOLIC BLOOD PRESSURE: 82 MMHG | BODY MASS INDEX: 26.31 KG/M2 | OXYGEN SATURATION: 96 % | RESPIRATION RATE: 17 BRPM | TEMPERATURE: 98 F | HEART RATE: 104 BPM

## 2023-03-27 DIAGNOSIS — R11.2 NAUSEA AND VOMITING, UNSPECIFIED VOMITING TYPE: Primary | ICD-10-CM

## 2023-03-27 DIAGNOSIS — R50.9 FEVER: ICD-10-CM

## 2023-03-27 LAB
ALBUMIN SERPL BCP-MCNC: 3.5 G/DL (ref 3.5–5)
ALBUMIN/GLOB SERPL: 0.9 {RATIO}
ALP SERPL-CCNC: 129 U/L (ref 55–142)
ALT SERPL W P-5'-P-CCNC: 68 U/L (ref 13–56)
ANION GAP SERPL CALCULATED.3IONS-SCNC: 16 MMOL/L (ref 7–16)
AST SERPL W P-5'-P-CCNC: 47 U/L (ref 15–37)
BASOPHILS # BLD AUTO: 0.03 K/UL (ref 0–0.2)
BASOPHILS NFR BLD AUTO: 0.3 % (ref 0–1)
BASOPHILS NFR BLD MANUAL: 1 % (ref 0–1)
BILIRUB SERPL-MCNC: 0.4 MG/DL (ref ?–1.2)
BILIRUB UR QL STRIP: NEGATIVE
BUN SERPL-MCNC: 20 MG/DL (ref 7–18)
BUN/CREAT SERPL: 16 (ref 6–20)
CALCIUM SERPL-MCNC: 10.6 MG/DL (ref 8.5–10.1)
CHLORIDE SERPL-SCNC: 98 MMOL/L (ref 98–107)
CLARITY UR: CLEAR
CO2 SERPL-SCNC: 24 MMOL/L (ref 21–32)
COLOR UR: YELLOW
CREAT SERPL-MCNC: 1.27 MG/DL (ref 0.55–1.02)
DIFFERENTIAL METHOD BLD: ABNORMAL
EGFR (NO RACE VARIABLE) (RUSH/TITUS): 43 ML/MIN/1.73M²
EOSINOPHIL # BLD AUTO: 0.01 K/UL (ref 0–0.5)
EOSINOPHIL NFR BLD AUTO: 0.1 % (ref 1–4)
ERYTHROCYTE [DISTWIDTH] IN BLOOD BY AUTOMATED COUNT: 13.5 % (ref 11.5–14.5)
FLUAV AG UPPER RESP QL IA.RAPID: NEGATIVE
FLUBV AG UPPER RESP QL IA.RAPID: NEGATIVE
GLOBULIN SER-MCNC: 3.9 G/DL (ref 2–4)
GLUCOSE SERPL-MCNC: 279 MG/DL (ref 74–106)
GLUCOSE UR STRIP-MCNC: 50 MG/DL
HCT VFR BLD AUTO: 36.3 % (ref 38–47)
HGB BLD-MCNC: 11.9 G/DL (ref 12–16)
IMM GRANULOCYTES # BLD AUTO: 0.03 K/UL (ref 0–0.04)
IMM GRANULOCYTES NFR BLD: 0.3 % (ref 0–0.4)
KETONES UR STRIP-SCNC: NEGATIVE MG/DL
LEUKOCYTE ESTERASE UR QL STRIP: NEGATIVE
LYMPHOCYTES # BLD AUTO: 0.32 K/UL (ref 1–4.8)
LYMPHOCYTES NFR BLD AUTO: 3.6 % (ref 27–41)
LYMPHOCYTES NFR BLD MANUAL: 7 % (ref 27–41)
MCH RBC QN AUTO: 30 PG (ref 27–31)
MCHC RBC AUTO-ENTMCNC: 32.8 G/DL (ref 32–36)
MCV RBC AUTO: 91.4 FL (ref 80–96)
MONOCYTES # BLD AUTO: 0.82 K/UL (ref 0–0.8)
MONOCYTES NFR BLD AUTO: 9.2 % (ref 2–6)
MONOCYTES NFR BLD MANUAL: 6 % (ref 2–6)
MPC BLD CALC-MCNC: 9.6 FL (ref 9.4–12.4)
NEUTROPHILS # BLD AUTO: 7.72 K/UL (ref 1.8–7.7)
NEUTROPHILS NFR BLD AUTO: 86.5 % (ref 53–65)
NEUTS BAND NFR BLD MANUAL: 2 % (ref 1–5)
NEUTS SEG NFR BLD MANUAL: 84 % (ref 50–62)
NITRITE UR QL STRIP: NEGATIVE
NRBC # BLD AUTO: 0 X10E3/UL
NRBC, AUTO (.00): 0 %
PH UR STRIP: 5.5 PH UNITS
PLATELET # BLD AUTO: 187 K/UL (ref 150–400)
PLATELET MORPHOLOGY: NORMAL
POTASSIUM SERPL-SCNC: 3.6 MMOL/L (ref 3.5–5.1)
PROT SERPL-MCNC: 7.4 G/DL (ref 6.4–8.2)
PROT UR QL STRIP: 10
RBC # BLD AUTO: 3.97 M/UL (ref 4.2–5.4)
RBC # UR STRIP: NEGATIVE /UL
RBC MORPH BLD: NORMAL
SARS-COV+SARS-COV-2 AG RESP QL IA.RAPID: NEGATIVE
SODIUM SERPL-SCNC: 134 MMOL/L (ref 136–145)
SP GR UR STRIP: 1.02
UROBILINOGEN UR STRIP-ACNC: NORMAL MG/DL
WBC # BLD AUTO: 8.93 K/UL (ref 4.5–11)

## 2023-03-27 PROCEDURE — 87428 SARSCOV & INF VIR A&B AG IA: CPT | Performed by: EMERGENCY MEDICINE

## 2023-03-27 PROCEDURE — 85025 COMPLETE CBC W/AUTO DIFF WBC: CPT | Performed by: EMERGENCY MEDICINE

## 2023-03-27 PROCEDURE — 99285 EMERGENCY DEPT VISIT HI MDM: CPT | Mod: 25

## 2023-03-27 PROCEDURE — 99284 EMERGENCY DEPT VISIT MOD MDM: CPT | Mod: ,,, | Performed by: EMERGENCY MEDICINE

## 2023-03-27 PROCEDURE — 80053 COMPREHEN METABOLIC PANEL: CPT | Performed by: EMERGENCY MEDICINE

## 2023-03-27 PROCEDURE — 87040 BLOOD CULTURE FOR BACTERIA: CPT | Performed by: EMERGENCY MEDICINE

## 2023-03-27 PROCEDURE — 63600175 PHARM REV CODE 636 W HCPCS: Performed by: EMERGENCY MEDICINE

## 2023-03-27 PROCEDURE — 96374 THER/PROPH/DIAG INJ IV PUSH: CPT

## 2023-03-27 PROCEDURE — 99284 PR EMERGENCY DEPT VISIT,LEVEL IV: ICD-10-PCS | Mod: ,,, | Performed by: EMERGENCY MEDICINE

## 2023-03-27 PROCEDURE — 81003 URINALYSIS AUTO W/O SCOPE: CPT | Performed by: EMERGENCY MEDICINE

## 2023-03-27 RX ORDER — ONDANSETRON 4 MG/1
4 TABLET, FILM COATED ORAL EVERY 6 HOURS
Qty: 12 TABLET | Refills: 0 | Status: SHIPPED | OUTPATIENT
Start: 2023-03-27

## 2023-03-27 RX ORDER — ONDANSETRON 2 MG/ML
4 INJECTION INTRAMUSCULAR; INTRAVENOUS
Status: COMPLETED | OUTPATIENT
Start: 2023-03-27 | End: 2023-03-27

## 2023-03-27 RX ADMIN — ONDANSETRON HYDROCHLORIDE 4 MG: 2 SOLUTION INTRAMUSCULAR; INTRAVENOUS at 01:03

## 2023-03-27 NOTE — DISCHARGE INSTRUCTIONS
TREAT FEVER WITH TYLENOL OR IBUPROFEN.  TAKE ZOFRAN AS DIRECTED FOR NAUSEA.  FOLLOW UP WITH YOUR PRIMARY CARE PROVIDER OR RETURN TO THE EMERGENCY DEPARTMENT IF SYMPTOMS PERSIST OR WORSEN OR OTHERWISE AS NEEDED.

## 2023-03-27 NOTE — ED TRIAGE NOTES
Pt reports fever, chills, and vomiting that started yesterday around 12. Her last dose of tylenol was around 10, pt states she can not take ibuprofen.

## 2023-03-27 NOTE — ED PROVIDER NOTES
Encounter Date: 3/27/2023    SCRIBE #1 NOTE: I, Christelle Garcia, am scribing for, and in the presence of,  Haroon Goncalves MD. I have scribed the entire note.     History     Chief Complaint   Patient presents with    Fever    Chills    Vomiting     This is a 76 y/o white female,who presents to the ED with complaints of a 24 hour fever and chills. She states she started running a fever last night and took Tylenol. She reports chills as well. She denies any abdominal pain or diarrhea but notes vomiting. She denies any sore throat but reports congestion. Pt also complains of a fall which happened this morning. She hit her head during the fall but there was no LOC. She takes a baby Aspirin daily. Th There are no other complaints/pain in the ED at this time.     The history is provided by the patient and a relative. No  was used.   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  Social History     Tobacco Use    Smoking status: Never    Smokeless tobacco: Never   Substance Use Topics    Alcohol use: Never    Drug use: Never     Review of Systems   Constitutional:  Positive for chills and fever.   HENT:  Positive for congestion. Negative for  sore throat.    Gastrointestinal:  Positive for vomiting. Negative for abdominal pain and diarrhea.   Neurological:  Negative for syncope.   All other systems reviewed and are negative.    Physical Exam     Initial Vitals [03/27/23 1125]   BP Pulse Resp Temp SpO2   (!) 144/82 104 17 (!) 101.6 °F (38.7 °C) 96 %      MAP       --         Physical Exam    Nursing note and vitals reviewed.  Constitutional: She appears well-developed and well-nourished.   HENT:   Head: Normocephalic and atraumatic.   Eyes: Conjunctivae and EOM are normal. Pupils are equal, round, and reactive to light.   Neck: Neck supple.   Normal range of motion.  Cardiovascular:  Normal rate, regular rhythm, normal heart sounds and intact distal pulses.           Pulmonary/Chest: Breath sounds normal.   Abdominal: Abdomen is soft. Bowel sounds are normal.   Musculoskeletal:         General: Normal range of motion.      Cervical back: Normal range of motion and neck supple.     Neurological: She is alert and oriented to person, place, and time. She has normal strength.   Skin: Skin is warm and dry. Capillary refill takes less than 2 seconds.   Psychiatric: She has a normal mood and affect. Thought content normal.       ED Course   Procedures  Labs Reviewed   COMPREHENSIVE METABOLIC PANEL - Abnormal; Notable for the following components:       Result Value    Sodium 134 (*)     Glucose 279 (*)     BUN 20 (*)     Creatinine 1.27 (*)     Calcium 10.6 (*)     ALT 68 (*)     AST 47 (*)     eGFR 43 (*)     All other components within normal limits   URINALYSIS, REFLEX TO URINE CULTURE - Abnormal; Notable for the following components:    Protein, UA 10 (*)     Glucose, UA 50 (*)     All other components within normal limits   CBC WITH DIFFERENTIAL - Abnormal; Notable for the following components:    RBC 3.97 (*)     Hemoglobin 11.9 (*)     Hematocrit 36.3 (*)     Neutrophils % 86.5 (*)     Lymphocytes % 3.6 (*)     Monocytes % 9.2 (*)     Eosinophils % 0.1  (*)     Neutrophils, Abs 7.72 (*)     Lymphocytes, Absolute 0.32 (*)     Monocytes, Absolute 0.82 (*)     All other components within normal limits   MANUAL DIFFERENTIAL - Abnormal; Notable for the following components:    Segmented Neutrophils, Man % 84 (*)     Lymphocytes, Man % 7 (*)     All other components within normal limits   SARS-COV2 (COVID) W/ FLU ANTIGEN - Normal    Narrative:     Negative SARS-CoV results should not be used as the sole basis for treatment or patient management decisions; negative results should be considered in the context of a patient's recent exposures, history and the presene of clinical signs and symptoms consistent with COVID-19.  Negative results should be treated as presumptive and confirmed by molecular assay, if necessary for patient management.   CULTURE, BLOOD   CULTURE, BLOOD   CBC W/ AUTO DIFFERENTIAL    Narrative:     The following orders were created for panel order CBC auto differential.  Procedure                               Abnormality         Status                     ---------                               -----------         ------                     CBC with Differential[[id number]]        Abnormal            Final result               Manual Differential[[id number]]          Abnormal            Final result                 Please view results for these tests on the individual orders.          Imaging Results              X-Ray Sinuses Ltd Less Than 3 Views (Final result)  Result time 03/27/23 14:08:21      Final result by Tavon Lambert II, MD (03/27/23 14:08:21)                   Impression:      No evidence of sinus abnormality demonstrated.      Electronically signed by: Tavon Lambert  Date:    03/27/2023  Time:    14:08               Narrative:    EXAMINATION:  XR SINUSES LTD LESS THAN 3 VIEWS    CLINICAL HISTORY:  Fever, unspecified    COMPARISON:  None.    FINDINGS:  No air-fluid levels or abnormal soft tissue density are present within the sinuses.  No fracture is identified. Sinuses appear normally formed. No other abnormality seen.                                       CT Chest Without Contrast (Final result)  Result time 03/27/23 14:10:12      Final result by Tavon Lambert II, MD (03/27/23 14:10:12)                   Impression:      No evidence of abnormality demonstrated.      Electronically signed by: Tavon Lambert  Date:    03/27/2023  Time:    14:10               Narrative:    EXAMINATION:  CT CHEST WITHOUT CONTRAST    CLINICAL HISTORY:  Respiratory illness, nondiagnostic xray;    TECHNIQUE:  Axial CT imaging of the chest was done at 3 mm intervals without contrast.    CT dose reduction technique used - Dose Rite and tube current modulation.    COMPARISON:  None available    FINDINGS:  The lungs show no evidence of infiltrates or airspace disease. No nodule or mass is identified. No effusion or pneumothorax is seen. The heart, mediastinum and great vessels appear within normal limits. No other abnormality is identified.                                       CT Head Without Contrast (Final result)  Result time 03/27/23 13:36:59      Final result by Tavon Lambert II, MD (03/27/23 13:36:59)                   Impression:      No evidence of abnormality demonstrated.      Electronically signed by: Tavon Lambert  Date:    03/27/2023  Time:    13:36               Narrative:    EXAMINATION:  CT HEAD WITHOUT CONTRAST    CLINICAL HISTORY:  Head trauma, moderate-severe;    TECHNIQUE:  Axial CT imaging of the brain is performed without contrast with 3 mm increments.    CT dose reduction technique used - Dose Rite and tube current modulation.    COMPARISON:  None available    FINDINGS:  No evidence of hemorrhage, mass, mass effect, midline shift or acute infarct seen.  The brain parenchyma attenuation and differentiation appears within normal limits. The ventricles and cisterns are normal in caliber.  No cranial or skull base abnormality is identified.                                        X-Ray Chest AP Portable (Final result)  Result time 03/27/23 12:09:01      Final result by Tavon Lambert II, MD (03/27/23 12:09:01)                   Impression:      No evidence of cardiopulmonary disease.      Electronically signed by: Tavon Lambert  Date:    03/27/2023  Time:    12:09               Narrative:    EXAMINATION:  XR CHEST AP PORTABLE    CLINICAL HISTORY:  Fever, unspecified    COMPARISON:  9 April 2022    TECHNIQUE:  XR CHEST AP PORTABLE    FINDINGS:  The heart and mediastinum are normal in size and configuration.  The pulmonary vascularity is normal in caliber.  No lung infiltrates, effusions, pneumothorax or other abnormality is demonstrated.                                    X-Rays:   Independently Interpreted Readings:   Chest X-Ray: Xray Chest AP Portable:   No evidence of cardiopulmonary disease.   Head CT: CT Head without contrast:   No evidence of abnormality demonstrated.   Other Readings:  CT Chest without contrast:   No evidence of abnormality demonstrated.        Xray Sinuses Ltd Less than 3 views:   No evidence of sinus abnormality demonstrated.  Medications   ondansetron injection 4 mg (4 mg Intravenous Given 3/27/23 1347)     Medical Decision Making:   Initial Assessment:   INTERMITTENT FEVER AND CHILLS.  NO SOURCE IDENTIFIED.  NO SPECIFIC SYMPTOMS OTHERWISE.    Differential Diagnosis:   DDX:  VIRAL SYNDROME VS OTHER.    Clinical Tests:   Lab Tests: Ordered and Reviewed  Radiological Study: Ordered and Reviewed  ED Management:  DX:  VIRAL ILLNESS.  SYMPTOMATIC TREATMENT.            Attending Attestation:           Physician Attestation for Scribe:  Physician Attestation Statement for Scribe #1: I, Haroon Goncalves MD, reviewed documentation, as scribed by Christelle Garcia in my presence, and it is both accurate and complete.           ED Course as of 03/27/23 1421   Mon Mar 27, 2023   1243 Xray Chest AP Portable:   No evidence of  cardiopulmonary disease. [BW]   1400 CT Head without contrast:   No evidence of abnormality demonstrated. [BW]   1416 CT Chest without contrast:   No evidence of abnormality demonstrated. [BW]   1418 Xray Sinuses Ltd Less than 3 views:   No evidence of sinus abnormality demonstrated. [BW]      ED Course User Index  [BW] Christelle Garcia                   Clinical Impression:   Final diagnoses:  [R50.9] Fever  [R11.2] Nausea and vomiting, unspecified vomiting type (Primary)        ED Disposition Condition    Discharge Stable          ED Prescriptions       Medication Sig Dispense Start Date End Date Auth. Provider    ondansetron (ZOFRAN) 4 MG tablet Take 1 tablet (4 mg total) by mouth every 6 (six) hours. 12 tablet 3/27/2023 -- Haroon Goncalves MD          Follow-up Information       Follow up With Specialties Details Why Contact Info    [facility]  As needed [address] [facility] [address]  [phone]               Haroon Goncalves MD  03/27/23 [id number]

## 2023-03-31 ENCOUNTER — TELEPHONE (OUTPATIENT)
Dept: GASTROENTEROLOGY | Facility: CLINIC | Age: 78
End: 2023-03-31
Payer: MEDICARE

## 2023-03-31 NOTE — TELEPHONE ENCOUNTER
Results and recommendations called to patient. Verbalized understanding.                ----- Message from MELANIA Noel sent at 3/31/2023 11:43 AM CDT -----  Calprotectin is still elevated. She reduced her Asacol. If she is having problems she should take the 6 daily instead of 4. Continue Entyvio

## 2023-04-02 LAB
BACTERIA BLD CULT: NORMAL
BACTERIA BLD CULT: NORMAL

## 2023-05-15 ENCOUNTER — INFUSION (OUTPATIENT)
Dept: INFUSION THERAPY | Facility: HOSPITAL | Age: 78
End: 2023-05-15
Attending: NURSE PRACTITIONER
Payer: MEDICARE

## 2023-05-15 VITALS
SYSTOLIC BLOOD PRESSURE: 153 MMHG | DIASTOLIC BLOOD PRESSURE: 65 MMHG | RESPIRATION RATE: 17 BRPM | HEART RATE: 65 BPM | OXYGEN SATURATION: 95 % | TEMPERATURE: 98 F

## 2023-05-15 DIAGNOSIS — K51.919 ULCERATIVE COLITIS WITH COMPLICATION, UNSPECIFIED LOCATION: Primary | ICD-10-CM

## 2023-05-15 PROCEDURE — 25000003 PHARM REV CODE 250: Performed by: NURSE PRACTITIONER

## 2023-05-15 PROCEDURE — 96365 THER/PROPH/DIAG IV INF INIT: CPT

## 2023-05-15 PROCEDURE — 96375 TX/PRO/DX INJ NEW DRUG ADDON: CPT

## 2023-05-15 PROCEDURE — 63600175 PHARM REV CODE 636 W HCPCS: Mod: JZ,JG | Performed by: NURSE PRACTITIONER

## 2023-05-15 RX ORDER — SODIUM CHLORIDE 0.9 % (FLUSH) 0.9 %
10 SYRINGE (ML) INJECTION
Status: DISCONTINUED | OUTPATIENT
Start: 2023-05-15 | End: 2023-05-15 | Stop reason: HOSPADM

## 2023-05-15 RX ORDER — ACETAMINOPHEN 325 MG/1
650 TABLET ORAL
Status: DISCONTINUED | OUTPATIENT
Start: 2023-05-15 | End: 2023-05-15 | Stop reason: HOSPADM

## 2023-05-15 RX ORDER — METHYLPREDNISOLONE SOD SUCC 125 MG
40 VIAL (EA) INJECTION
Status: CANCELLED | OUTPATIENT
Start: 2023-07-10

## 2023-05-15 RX ORDER — ACETAMINOPHEN 325 MG/1
650 TABLET ORAL
Status: CANCELLED | OUTPATIENT
Start: 2023-07-10

## 2023-05-15 RX ORDER — DIPHENHYDRAMINE HYDROCHLORIDE 50 MG/ML
25 INJECTION INTRAMUSCULAR; INTRAVENOUS
Status: CANCELLED | OUTPATIENT
Start: 2023-07-10

## 2023-05-15 RX ORDER — DIPHENHYDRAMINE HYDROCHLORIDE 50 MG/ML
25 INJECTION INTRAMUSCULAR; INTRAVENOUS
Status: DISCONTINUED | OUTPATIENT
Start: 2023-05-15 | End: 2023-05-15 | Stop reason: HOSPADM

## 2023-05-15 RX ORDER — SODIUM CHLORIDE 0.9 % (FLUSH) 0.9 %
10 SYRINGE (ML) INJECTION
Status: CANCELLED | OUTPATIENT
Start: 2023-07-10

## 2023-05-15 RX ORDER — ACETAMINOPHEN 325 MG/1
650 TABLET ORAL ONCE
Status: COMPLETED | OUTPATIENT
Start: 2023-05-15 | End: 2023-05-15

## 2023-05-15 RX ORDER — IPRATROPIUM BROMIDE AND ALBUTEROL SULFATE 2.5; .5 MG/3ML; MG/3ML
3 SOLUTION RESPIRATORY (INHALATION)
Status: CANCELLED | OUTPATIENT
Start: 2023-07-10

## 2023-05-15 RX ORDER — EPINEPHRINE 1 MG/ML
0.3 INJECTION, SOLUTION, CONCENTRATE INTRAVENOUS
Status: CANCELLED | OUTPATIENT
Start: 2023-07-10

## 2023-05-15 RX ORDER — ACETAMINOPHEN 325 MG/1
650 TABLET ORAL ONCE
Status: CANCELLED | OUTPATIENT
Start: 2023-07-10 | End: 2023-07-10

## 2023-05-15 RX ADMIN — VEDOLIZUMAB 300 MG: 300 INJECTION, POWDER, LYOPHILIZED, FOR SOLUTION INTRAVENOUS at 12:05

## 2023-05-15 RX ADMIN — ACETAMINOPHEN 650 MG: 325 TABLET ORAL at 12:05

## 2023-05-15 RX ADMIN — DIPHENHYDRAMINE HYDROCHLORIDE 25 MG: 50 INJECTION INTRAMUSCULAR; INTRAVENOUS at 12:05

## 2023-05-15 NOTE — PROGRESS NOTES
1200 Pt here for Entyvio infusion, resting in recliner, TP released and pharmacy notified   1217 Entyvio infusion started to go in over 30 min  1250 Entyvio infusion complete, tolerated well, will continue to monitor  1310 pt discharged ambulatory with no adverse reaction noted, pt notified to go to ER with any adverse reactions, AVS given along with delayed transfusion reaction information, pt verbalized understanding  Follow up appt made for eight weeks

## 2023-05-15 NOTE — PATIENT INSTRUCTIONS
Return to ER if you feel short of breath, tongue swelling, or feel as though your throat is closing up.

## 2023-06-13 ENCOUNTER — OFFICE VISIT (OUTPATIENT)
Dept: GASTROENTEROLOGY | Facility: CLINIC | Age: 78
End: 2023-06-13
Payer: MEDICARE

## 2023-06-13 VITALS
WEIGHT: 147 LBS | HEIGHT: 62 IN | BODY MASS INDEX: 27.05 KG/M2 | SYSTOLIC BLOOD PRESSURE: 145 MMHG | DIASTOLIC BLOOD PRESSURE: 57 MMHG

## 2023-06-13 DIAGNOSIS — D50.0 IRON DEFICIENCY ANEMIA DUE TO CHRONIC BLOOD LOSS: ICD-10-CM

## 2023-06-13 DIAGNOSIS — K51.919 ULCERATIVE COLITIS WITH COMPLICATION, UNSPECIFIED LOCATION: Primary | ICD-10-CM

## 2023-06-13 PROCEDURE — 99214 OFFICE O/P EST MOD 30 MIN: CPT | Mod: S$PBB,,, | Performed by: NURSE PRACTITIONER

## 2023-06-13 PROCEDURE — 99215 OFFICE O/P EST HI 40 MIN: CPT | Mod: PBBFAC | Performed by: NURSE PRACTITIONER

## 2023-06-13 PROCEDURE — 99214 PR OFFICE/OUTPT VISIT, EST, LEVL IV, 30-39 MIN: ICD-10-PCS | Mod: S$PBB,,, | Performed by: NURSE PRACTITIONER

## 2023-06-13 RX ORDER — NITROGLYCERIN 0.4 MG/1
0.4 TABLET SUBLINGUAL EVERY 5 MIN PRN
COMMUNITY

## 2023-06-13 RX ORDER — CHOLECALCIFEROL (VITAMIN D3) 25 MCG
5000 TABLET ORAL DAILY
COMMUNITY

## 2023-06-13 RX ORDER — METFORMIN HYDROCHLORIDE 500 MG/1
500 TABLET ORAL 2 TIMES DAILY WITH MEALS
COMMUNITY

## 2023-06-13 RX ORDER — ISOSORBIDE MONONITRATE 30 MG/1
30 TABLET, EXTENDED RELEASE ORAL DAILY
COMMUNITY
Start: 2023-05-23

## 2023-06-13 RX ORDER — LORATADINE 10 MG/1
10 TABLET ORAL DAILY
COMMUNITY

## 2023-06-13 RX ORDER — POLYETHYLENE GLYCOL 3350 17 G/17G
POWDER, FOR SOLUTION ORAL
COMMUNITY

## 2023-06-13 NOTE — PROGRESS NOTES
Jose Russell is a 78 y.o. female here for Follow-up        PCP: Alyssa Michele  Referring Provider: No referring provider defined for this encounter.     HPI:  Presents for follow-up due to ulcerative colitis.  Patient is receiving Entyvio infusions.  Reports that about 7-10 days following her last infusion that she was hospitalized at [facility] due to chest pain.  Reports that chest pain lasted for 2-3 days following infusion.  The chest pain was increased with trying to take a deep breath.  Reports that she has been told that she has small-vessel disease.  I do not have the records from [facility] for review.  The patient is concerned that this may be related to her infusions.  She is also had a couple of episodes of fever and chills several days after infusions.  Reports that UC symptoms are much better.  States that bowel movements are improved with less constipation.  No rectal bleeding.  Abdominal pain is improved.  Appetite is increased.  Weight has increased 3 lb since the last visit.  She does use MiraLax powder daily to prevent constipation.  States that she does not want to stop infusions unless absolutely necessary.  She has seen great improvement since starting Entyvio.  She is currently taking mesalamine 800 mg 2 tablets twice daily.  She does have a history of iron-deficiency.  She is up-to-date on bone density scans, last scan performed at [facility].    Follow-up  Pertinent negatives include no abdominal pain, change in bowel habit, chest pain, fatigue, fever, joint swelling, nausea or vomiting.       ROS:  Review of Systems   Constitutional:  Negative for appetite change, fatigue, fever and unexpected weight change.   HENT:  Negative for trouble swallowing.    Respiratory:  Negative for shortness of breath.    Cardiovascular:  Negative for chest pain.   Gastrointestinal:  Positive for constipation. Negative for abdominal pain, anal bleeding, blood in stool, change in bowel habit, diarrhea,  nausea, vomiting, reflux and change in bowel habit.   Musculoskeletal:  Negative for back pain, gait problem and joint swelling.   Integumentary:  Negative for pallor.   Neurological:  Negative for dizziness and light-headedness.   Hematological:  Does not bruise/bleed easily.   Psychiatric/Behavioral:  The patient is not nervous/anxious.         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    PSHX:  has a past surgical history that includes Colonoscopy (06/12/2020); Esophagogastroduodenoscopy (05/24/2018); Hysterectomy; Cholecystectomy; and Nephrectomy.    PFHX: family history is not on file.    PSlHX:  reports that she has never smoked. She has never used smokeless tobacco. She reports that she does not drink alcohol and does not use drugs.        Review of patient's allergies indicates:   Allergen Reactions    Levofloxacin in d5w     Mercaptopurine analogues (thiopurines)     Penicillins     Sulfa (sulfonamide antibiotics)        Medication List with Changes/Refills   Current Medications    ALLOPURINOL (ZYLOPRIM) 100 MG TABLET    Take 200 mg by mouth once daily.    ASPIRIN (ECOTRIN) 81 MG EC TABLET    Take 81 mg by mouth once daily.    ATENOLOL (TENORMIN) 25 MG TABLET    Take 25 mg by mouth once daily.    AZITHROMYCIN (ZITHROMAX Z-AKHIL) 250 MG TABLET    Take two tablets today and then one tablet daily for four days    BIFIDOBACTERIUM INFANTIS (ALIGN ORAL)    Take by mouth once daily.    CITALOPRAM (CELEXA) 20 MG TABLET    Take 20 mg by mouth once daily.    CLOBETASOL (TEMOVATE) 0.05 % EXTERNAL SOLUTION    Apply to AA on scalp BID PRN flares    ESOMEPRAZOLE (NEXIUM) 40 MG CAPSULE     "Take 40 mg by mouth once daily.    GLYBURIDE (DIABETA) 5 MG TABLET    Take 5 mg by mouth 2 (two) times daily with meals.    HYDRALAZINE (APRESOLINE) 50 MG TABLET    Take 50 mg by mouth once daily.    ISOSORBIDE MONONITRATE (IMDUR) 30 MG 24 HR TABLET    Take 30 mg by mouth once daily.    KETOCONAZOLE (NIZORAL) 2 % SHAMPOO    Use as a scalp treatment 2-3 times a week for maintenance, massaging into scalp and leaving on for up to 3 minutes before rinsing    LEVOTHYROXINE (SYNTHROID) 50 MCG TABLET    Take 50 mcg by mouth before breakfast.    LORATADINE (CLARITIN) 10 MG TABLET    Take 10 mg by mouth once daily.    MAGNESIUM OXIDE (MAG-OX) 400 MG (241.3 MG MAGNESIUM) TABLET    Take 1 tablet by mouth.    MESALAMINE (ASACOL HD) 800 MG TBEC    Take 2 tablets (1,600 mg total) by mouth 2 (two) times a day.    METFORMIN (GLUCOPHAGE) 500 MG TABLET    Take 500 mg by mouth 2 (two) times daily with meals.    NITROGLYCERIN (NITROSTAT) 0.4 MG SL TABLET    Place 0.4 mg under the tongue every 5 (five) minutes as needed for Chest pain.    OMEGA-3 FATTY ACIDS/FISH OIL (FISH OIL-OMEGA-3 FATTY ACIDS) 300-1,000 MG CAPSULE    Take by mouth once daily.    ONDANSETRON (ZOFRAN) 4 MG TABLET    Take 1 tablet (4 mg total) by mouth every 6 (six) hours.    POLYETHYLENE GLYCOL (GLYCOLAX) 17 GRAM PWPK    Take by mouth.    PRAVASTATIN (PRAVACHOL) 40 MG TABLET    Take 40 mg by mouth once daily.    SUCRALFATE (CARAFATE) 1 GRAM TABLET    Take 1 tablet (1 g total) by mouth 4 (four) times daily as needed (Epigastric pain).    TORSEMIDE (DEMADEX) 20 MG TAB    Take 10 mg by mouth once daily.    VEDOLIZUMAB (ENTYVIO IV)    Inject 300 mg into the vein every 8 weeks.    VERAPAMIL (VERELAN) 240 MG C24P    Take 240 mg by mouth once daily.    VITAMIN D (VITAMIN D3) 1000 UNITS TAB    Take 5,000 Units by mouth once daily.        Objective Findings:  Vital Signs:  BP (!) 145/57   Ht 5' 2" (1.575 m)   Wt 66.7 kg (147 lb)   BMI 26.89 kg/m²  Body mass index is 26.89 " kg/m².    Physical Exam:  Physical Exam  Vitals and nursing note reviewed.   Constitutional:       General: She is not in acute distress.     Appearance: Normal appearance.   HENT:      Mouth/Throat:      Mouth: Mucous membranes are moist.   Cardiovascular:      Rate and Rhythm: Normal rate.   Pulmonary:      Effort: Pulmonary effort is normal.      Breath sounds: No rhonchi.   Abdominal:      General: Bowel sounds are normal. There is no distension.      Palpations: Abdomen is soft. There is no mass.      Tenderness: There is no abdominal tenderness. There is no guarding.   Skin:     General: Skin is warm and dry.      Coloration: Skin is not jaundiced or pale.   Neurological:      Mental Status: She is alert and oriented to person, place, and time.   Psychiatric:         Mood and Affect: Mood normal.        Labs:  Lab Results   Component Value Date    WBC 7.38 06/13/2023    HGB 11.5 (L) 06/13/2023    HCT 35.9 (L) 06/13/2023    MCV 93.7 06/13/2023    RDW 14.3 06/13/2023     06/13/2023    LYMPH 18.2 (L) 06/13/2023    LYMPH 1.34 06/13/2023    MONO 6.5 (H) 06/13/2023    EOS 0.18 06/13/2023    BASO 0.03 06/13/2023     Lab Results   Component Value Date     (L) 03/27/2023    K 3.6 03/27/2023    CL 98 03/27/2023    CO2 24 03/27/2023     (H) 03/27/2023    BUN 20 (H) 03/27/2023    CREATININE 1.27 (H) 03/27/2023    CALCIUM 10.6 (H) 03/27/2023    PROT 7.4 03/27/2023    ALBUMIN 3.5 03/27/2023    BILITOT 0.4 03/27/2023    ALKPHOS 129 03/27/2023    AST 47 (H) 03/27/2023    ALT 68 (H) 03/27/2023         Imaging: No results found.      Assessment:  Jose Russell is a 78 y.o. female here with:  1. Ulcerative colitis with complication, unspecified location    2. Iron deficiency anemia due to chronic blood loss          Recommendations:  1. CRP, CBC, CMP, iron studies  2. Request records from [facility], we will consider plan of care after review of medical records and make a decision whether or not to  proceed with Entyvio infusions  3. Continue mesalamine  4. Avoid NSAID's    Follow up in about 4 weeks (around 7/11/2023).      Order summary:  Orders Placed This Encounter    C-Reactive Protein    CBC Auto Differential    Comprehensive Metabolic Panel    Iron and TIBC    Ferritin       Thank you for allowing me to participate in the care of Jose Russell.      SHIVA AbdiC

## 2023-06-16 ENCOUNTER — TELEPHONE (OUTPATIENT)
Dept: GASTROENTEROLOGY | Facility: CLINIC | Age: 78
End: 2023-06-16
Payer: MEDICARE

## 2023-06-16 RX ORDER — METHYLPREDNISOLONE SOD SUCC 125 MG
125 VIAL (EA) INJECTION
Status: CANCELLED
Start: 2023-07-09

## 2023-06-16 NOTE — TELEPHONE ENCOUNTER
Results and recommendations called to patient. Verbalized understanding.            ----- Message from MELANIA Noel sent at 6/16/2023  7:19 AM CDT -----  CRP is still slightly elevated but decreased from 8 months ago.  Iron is normal.  Ferritin is elevated but decreased from 8 months ago ferritin elevation is probably due to inflammation.  Mild anemia that is stable.  Advised that we have discussed treatment plan with Dr. Salcido.  We will add Solu-Medrol 125 mg prior to each infusion along with Benadryl and Tylenol to try to decrease the risk of febrile reaction following infusion.  We will continue Entyvio at this time.

## 2023-06-20 DIAGNOSIS — K51.919 ULCERATIVE COLITIS WITH COMPLICATION, UNSPECIFIED LOCATION: ICD-10-CM

## 2023-06-20 RX ORDER — MESALAMINE 800 MG/1
1600 TABLET, DELAYED RELEASE ORAL 2 TIMES DAILY
Qty: 360 TABLET | Refills: 3 | Status: SHIPPED | OUTPATIENT
Start: 2023-06-20 | End: 2023-06-23 | Stop reason: SDUPTHER

## 2023-06-20 NOTE — TELEPHONE ENCOUNTER
----- Message from Bibiana Doe sent at 6/20/2023  3:09 PM CDT -----  Asacol refill. [facility] pharmacy needs  3 refills

## 2023-06-23 DIAGNOSIS — K51.919 ULCERATIVE COLITIS WITH COMPLICATION, UNSPECIFIED LOCATION: ICD-10-CM

## 2023-06-23 RX ORDER — MESALAMINE 800 MG/1
1600 TABLET, DELAYED RELEASE ORAL 2 TIMES DAILY
Qty: 360 TABLET | Refills: 3 | Status: SHIPPED | OUTPATIENT
Start: 2023-06-23 | End: 2023-08-10 | Stop reason: SDUPTHER

## 2023-07-10 ENCOUNTER — INFUSION (OUTPATIENT)
Dept: INFUSION THERAPY | Facility: HOSPITAL | Age: 78
End: 2023-07-10
Attending: NURSE PRACTITIONER
Payer: MEDICARE

## 2023-07-10 VITALS
HEART RATE: 65 BPM | SYSTOLIC BLOOD PRESSURE: 149 MMHG | OXYGEN SATURATION: 95 % | RESPIRATION RATE: 17 BRPM | DIASTOLIC BLOOD PRESSURE: 68 MMHG

## 2023-07-10 DIAGNOSIS — K51.919 ULCERATIVE COLITIS WITH COMPLICATION, UNSPECIFIED LOCATION: Primary | ICD-10-CM

## 2023-07-10 PROCEDURE — 63600175 PHARM REV CODE 636 W HCPCS: Mod: JZ,JG | Performed by: NURSE PRACTITIONER

## 2023-07-10 PROCEDURE — 25000003 PHARM REV CODE 250: Performed by: NURSE PRACTITIONER

## 2023-07-10 PROCEDURE — 96365 THER/PROPH/DIAG IV INF INIT: CPT

## 2023-07-10 RX ORDER — DIPHENHYDRAMINE HYDROCHLORIDE 50 MG/ML
25 INJECTION INTRAMUSCULAR; INTRAVENOUS
Status: CANCELLED | OUTPATIENT
Start: 2023-09-04

## 2023-07-10 RX ORDER — METHYLPREDNISOLONE SOD SUCC 125 MG
125 VIAL (EA) INJECTION
Status: COMPLETED | OUTPATIENT
Start: 2023-07-10 | End: 2023-07-10

## 2023-07-10 RX ORDER — EPINEPHRINE 1 MG/ML
0.3 INJECTION, SOLUTION, CONCENTRATE INTRAVENOUS
Status: CANCELLED | OUTPATIENT
Start: 2023-09-04

## 2023-07-10 RX ORDER — METHYLPREDNISOLONE SOD SUCC 125 MG
40 VIAL (EA) INJECTION
Status: CANCELLED | OUTPATIENT
Start: 2023-09-04

## 2023-07-10 RX ORDER — IPRATROPIUM BROMIDE AND ALBUTEROL SULFATE 2.5; .5 MG/3ML; MG/3ML
3 SOLUTION RESPIRATORY (INHALATION)
Status: CANCELLED | OUTPATIENT
Start: 2023-09-04

## 2023-07-10 RX ORDER — DIPHENHYDRAMINE HYDROCHLORIDE 50 MG/ML
25 INJECTION INTRAMUSCULAR; INTRAVENOUS
Status: DISPENSED | OUTPATIENT
Start: 2023-07-10 | End: 2023-07-10

## 2023-07-10 RX ORDER — SODIUM CHLORIDE 0.9 % (FLUSH) 0.9 %
10 SYRINGE (ML) INJECTION
Status: DISCONTINUED | OUTPATIENT
Start: 2023-07-10 | End: 2023-07-10 | Stop reason: HOSPADM

## 2023-07-10 RX ORDER — ACETAMINOPHEN 325 MG/1
650 TABLET ORAL
Status: ACTIVE | OUTPATIENT
Start: 2023-07-10 | End: 2023-07-10

## 2023-07-10 RX ORDER — METHYLPREDNISOLONE SOD SUCC 125 MG
125 VIAL (EA) INJECTION
Status: CANCELLED
Start: 2023-09-04

## 2023-07-10 RX ORDER — ACETAMINOPHEN 325 MG/1
650 TABLET ORAL
Status: CANCELLED | OUTPATIENT
Start: 2023-09-04

## 2023-07-10 RX ORDER — SODIUM CHLORIDE 0.9 % (FLUSH) 0.9 %
10 SYRINGE (ML) INJECTION
Status: CANCELLED | OUTPATIENT
Start: 2023-09-04

## 2023-07-10 RX ORDER — ACETAMINOPHEN 325 MG/1
650 TABLET ORAL ONCE
Status: CANCELLED | OUTPATIENT
Start: 2023-09-04 | End: 2023-09-04

## 2023-07-10 RX ORDER — ACETAMINOPHEN 325 MG/1
650 TABLET ORAL ONCE
Status: COMPLETED | OUTPATIENT
Start: 2023-07-10 | End: 2023-07-10

## 2023-07-10 RX ADMIN — ACETAMINOPHEN 650 MG: 325 TABLET ORAL at 11:07

## 2023-07-10 RX ADMIN — VEDOLIZUMAB 300 MG: 300 INJECTION, POWDER, LYOPHILIZED, FOR SOLUTION INTRAVENOUS at 12:07

## 2023-07-10 RX ADMIN — METHYLPREDNISOLONE SODIUM SUCCINATE 125 MG: 125 INJECTION, POWDER, FOR SOLUTION INTRAMUSCULAR; INTRAVENOUS at 11:07

## 2023-07-10 RX ADMIN — DIPHENHYDRAMINE HYDROCHLORIDE 25 MG: 50 INJECTION INTRAMUSCULAR; INTRAVENOUS at 11:07

## 2023-07-10 NOTE — PROGRESS NOTES
1122 Patient arrived for Entyvio infusion today ambulatory to [facility]   Premedications given tylenol 650mg , 25mg benadryl IV , 125mg solumedrol IV see MAR  1201 infusion started  1237 infusion finished  1238 int flushed   1250 Patient discharged , given AVS and told to watch for signs and symptoms of adverse reactions , if had any to go to the ER. Patient given upcoming appointment

## 2023-07-10 NOTE — PATIENT INSTRUCTIONS
Please return to the ER if you have any signs and symptoms of an allergic reaction or develop any adverse reactions.

## 2023-07-18 ENCOUNTER — OFFICE VISIT (OUTPATIENT)
Dept: GASTROENTEROLOGY | Facility: CLINIC | Age: 78
End: 2023-07-18
Payer: MEDICARE

## 2023-07-18 VITALS
SYSTOLIC BLOOD PRESSURE: 137 MMHG | DIASTOLIC BLOOD PRESSURE: 56 MMHG | HEIGHT: 62 IN | WEIGHT: 146 LBS | BODY MASS INDEX: 26.87 KG/M2

## 2023-07-18 DIAGNOSIS — D50.0 IRON DEFICIENCY ANEMIA DUE TO CHRONIC BLOOD LOSS: ICD-10-CM

## 2023-07-18 DIAGNOSIS — K59.00 CONSTIPATION, UNSPECIFIED CONSTIPATION TYPE: ICD-10-CM

## 2023-07-18 DIAGNOSIS — K51.919 ULCERATIVE COLITIS WITH COMPLICATION, UNSPECIFIED LOCATION: Primary | ICD-10-CM

## 2023-07-18 PROCEDURE — 99214 PR OFFICE/OUTPT VISIT, EST, LEVL IV, 30-39 MIN: ICD-10-PCS | Mod: S$PBB,,, | Performed by: NURSE PRACTITIONER

## 2023-07-18 PROCEDURE — 99215 OFFICE O/P EST HI 40 MIN: CPT | Mod: PBBFAC | Performed by: NURSE PRACTITIONER

## 2023-07-18 PROCEDURE — 99214 OFFICE O/P EST MOD 30 MIN: CPT | Mod: S$PBB,,, | Performed by: NURSE PRACTITIONER

## 2023-07-18 NOTE — PROGRESS NOTES
Jose Russell is a 78 y.o. female here for No chief complaint on file.        PCP: Alyssa Michele  Referring Provider: No referring provider defined for this encounter.     HPI:  Presents for one-month follow-up due to ulcerative colitis.  Prior to the last infusion she received Solu-Medrol 125 mg as well as Benadryl and Tylenol as a premed to Entyvio.  Reports that following Entyvio infusion that side effects of fever and chills were minimized with the premed.  No recent chest pains.  Reports 1 episode of low-grade temp of 99.6° since the last infusion.  Feels that Entyvio is working well to control her symptoms of UC.  She does have intermittent constipation.  Reports that this has also improved and that she is not requiring MiraLax daily.  No hematochezia or melena.  The last flex sigmoid was 11/15/2022, acutely inflamed granulation tissue.  Is currently also taking Asacol.  Reports that [facility].  Discussed that she may discuss with [facility] alternative mesalamine products such as Lialda.  If this is available we can change prescription.  She is scheduled to have a bone density test tomorrow at [facility].  Last EGD was 04/01/2022, no gluten sensitivity.  Labs from 06/13 reviewed, HGB is 11.5 and HCT 35.9, liver enzymes are normal, CRP is mildly elevated at 1.47.  In March calprotectin was 547.        ROS:  Review of Systems   Constitutional:  Negative for appetite change, fatigue, fever and unexpected weight change.   HENT:  Negative for trouble swallowing.    Respiratory:  Negative for shortness of breath.    Cardiovascular:  Negative for chest pain.   Gastrointestinal:  Positive for abdominal pain (intermittent cramping) and constipation. Negative for blood in stool, change in bowel habit, diarrhea, nausea, vomiting, reflux and change in bowel habit.   Musculoskeletal:  Positive for arthralgias. Negative for gait problem.   Integumentary:  Negative  for pallor.   Psychiatric/Behavioral:  The patient is not nervous/anxious.         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    PSHX:  has a past surgical history that includes Colonoscopy (06/12/2020); Esophagogastroduodenoscopy (05/24/2018); Hysterectomy; Cholecystectomy; and Nephrectomy.    PFHX: family history is not on file.    PSlHX:  reports that she has never smoked. She has never used smokeless tobacco. She reports that she does not drink alcohol and does not use drugs.        Review of patient's allergies indicates:   Allergen Reactions    Levofloxacin in d5w     Mercaptopurine analogues (thiopurines)     Penicillins     Sulfa (sulfonamide antibiotics)        Medication List with Changes/Refills   Current Medications    ALLOPURINOL (ZYLOPRIM) 100 MG TABLET    Take 200 mg by mouth once daily.    ASPIRIN (ECOTRIN) 81 MG EC TABLET    Take 81 mg by mouth once daily.    ATENOLOL (TENORMIN) 25 MG TABLET    Take 25 mg by mouth once daily.    AZITHROMYCIN (ZITHROMAX Z-AKHIL) 250 MG TABLET    Take two tablets today and then one tablet daily for four days    BIFIDOBACTERIUM INFANTIS (ALIGN ORAL)    Take by mouth once daily.    CITALOPRAM (CELEXA) 20 MG TABLET    Take 20 mg by mouth once daily.    CLOBETASOL (TEMOVATE) 0.05 % EXTERNAL SOLUTION    Apply to AA on scalp BID PRN flares    ESOMEPRAZOLE (NEXIUM) 40 MG CAPSULE    Take 40 mg by mouth once daily.    GLYBURIDE (DIABETA) 5 MG TABLET    Take 5 mg by mouth 2 (two) times daily with meals.    HYDRALAZINE (APRESOLINE) 50 MG TABLET    Take 50 mg by mouth once daily.    ISOSORBIDE MONONITRATE (IMDUR) 30 MG 24 HR TABLET    Take 30 mg by  "mouth once daily.    KETOCONAZOLE (NIZORAL) 2 % SHAMPOO    Use as a scalp treatment 2-3 times a week for maintenance, massaging into scalp and leaving on for up to 3 minutes before rinsing    LEVOTHYROXINE (SYNTHROID) 50 MCG TABLET    Take 50 mcg by mouth before breakfast.    LORATADINE (CLARITIN) 10 MG TABLET    Take 10 mg by mouth once daily.    MAGNESIUM OXIDE (MAG-OX) 400 MG (241.3 MG MAGNESIUM) TABLET    Take 1 tablet by mouth.    MESALAMINE (ASACOL HD) 800 MG TBEC    Take 2 tablets (1,600 mg total) by mouth 2 (two) times a day.    METFORMIN (GLUCOPHAGE) 500 MG TABLET    Take 500 mg by mouth 2 (two) times daily with meals.    NITROGLYCERIN (NITROSTAT) 0.4 MG SL TABLET    Place 0.4 mg under the tongue every 5 (five) minutes as needed for Chest pain.    OMEGA-3 FATTY ACIDS/FISH OIL (FISH OIL-OMEGA-3 FATTY ACIDS) 300-1,000 MG CAPSULE    Take by mouth once daily.    ONDANSETRON (ZOFRAN) 4 MG TABLET    Take 1 tablet (4 mg total) by mouth every 6 (six) hours.    POLYETHYLENE GLYCOL (GLYCOLAX) 17 GRAM PWPK    Take by mouth.    PRAVASTATIN (PRAVACHOL) 40 MG TABLET    Take 40 mg by mouth once daily.    SUCRALFATE (CARAFATE) 1 GRAM TABLET    Take 1 tablet (1 g total) by mouth 4 (four) times daily as needed (Epigastric pain).    TORSEMIDE (DEMADEX) 20 MG TAB    Take 10 mg by mouth once daily.    VEDOLIZUMAB (ENTYVIO IV)    Inject 300 mg into the vein every 8 weeks.    VERAPAMIL (VERELAN) 240 MG C24P    Take 240 mg by mouth once daily.    VITAMIN D (VITAMIN D3) 1000 UNITS TAB    Take 5,000 Units by mouth once daily.        Objective Findings:  Vital Signs:  BP (!) 137/56   Ht 5' 2" (1.575 m)   Wt 66.2 kg (146 lb)   BMI 26.70 kg/m²  Body mass index is 26.7 kg/m².    Physical Exam:  Physical Exam  Vitals and nursing note reviewed.   Constitutional:       General: She is not in acute distress.     Appearance: Normal appearance.   HENT:      Mouth/Throat:      Mouth: Mucous membranes are moist.   Cardiovascular:      Rate " and Rhythm: Normal rate.   Pulmonary:      Effort: Pulmonary effort is normal.      Breath sounds: No wheezing, rhonchi or rales.   Abdominal:      General: Bowel sounds are normal. There is no distension.      Palpations: Abdomen is soft. There is no mass.      Tenderness: There is no abdominal tenderness. There is no guarding.   Skin:     General: Skin is warm and dry.      Coloration: Skin is not jaundiced or pale.   Neurological:      Mental Status: She is alert and oriented to person, place, and time.   Psychiatric:         Mood and Affect: Mood normal.        Labs:  Lab Results   Component Value Date    WBC 7.38 06/13/2023    HGB 11.5 (L) 06/13/2023    HCT 35.9 (L) 06/13/2023    MCV 93.7 06/13/2023    RDW 14.3 06/13/2023     06/13/2023    LYMPH 18.2 (L) 06/13/2023    LYMPH 1.34 06/13/2023    MONO 6.5 (H) 06/13/2023    EOS 0.18 06/13/2023    BASO 0.03 06/13/2023     Lab Results   Component Value Date     06/13/2023    K 4.0 06/13/2023     06/13/2023    CO2 28 06/13/2023     (H) 06/13/2023    BUN 14 06/13/2023    CREATININE 0.91 06/13/2023    CALCIUM 10.6 (H) 06/13/2023    PROT 7.0 06/13/2023    ALBUMIN 3.5 06/13/2023    BILITOT 0.3 06/13/2023    ALKPHOS 134 06/13/2023    AST 28 06/13/2023    ALT 24 06/13/2023         Imaging: No results found.      Assessment:  Jose Russell is a 78 y.o. female here with:  1. Ulcerative colitis with complication, unspecified location    2. Iron deficiency anemia due to chronic blood loss    3. Constipation, unspecified constipation type          Recommendations:  1. We will continue premed of Solu-Medrol, Benadryl, and Tylenol prior to Entyvio infusions   2. Patient will discuss with [facility] alternative to Asacol for a mesalamine product.  3. CBC, CRP and CMP in 3 months    Follow up in about 3 months (around 10/18/2023).      Order summary:       Thank you for allowing me to participate in the care of Jose Russell.      Johana Corbin,  FNP-C

## 2023-08-10 DIAGNOSIS — K51.919 ULCERATIVE COLITIS WITH COMPLICATION, UNSPECIFIED LOCATION: ICD-10-CM

## 2023-08-10 RX ORDER — MESALAMINE 800 MG/1
1600 TABLET, DELAYED RELEASE ORAL 2 TIMES DAILY
Qty: 360 TABLET | Refills: 3 | Status: SHIPPED | OUTPATIENT
Start: 2023-08-10 | End: 2023-08-11 | Stop reason: SDUPTHER

## 2023-08-11 DIAGNOSIS — K51.919 ULCERATIVE COLITIS WITH COMPLICATION, UNSPECIFIED LOCATION: ICD-10-CM

## 2023-08-11 RX ORDER — MESALAMINE 800 MG/1
2400 TABLET, DELAYED RELEASE ORAL 2 TIMES DAILY
Qty: 540 TABLET | Refills: 3 | Status: SHIPPED | OUTPATIENT
Start: 2023-08-11 | End: 2024-08-10

## 2023-08-11 RX ORDER — MESALAMINE 800 MG/1
1600 TABLET, DELAYED RELEASE ORAL 2 TIMES DAILY
Qty: 360 TABLET | Refills: 3 | Status: CANCELLED | OUTPATIENT
Start: 2023-08-11 | End: 2024-08-10

## 2023-09-05 ENCOUNTER — INFUSION (OUTPATIENT)
Dept: INFUSION THERAPY | Facility: HOSPITAL | Age: 78
End: 2023-09-05
Attending: NURSE PRACTITIONER
Payer: MEDICARE

## 2023-09-05 VITALS
DIASTOLIC BLOOD PRESSURE: 72 MMHG | HEART RATE: 72 BPM | BODY MASS INDEX: 26.52 KG/M2 | SYSTOLIC BLOOD PRESSURE: 166 MMHG | RESPIRATION RATE: 17 BRPM | TEMPERATURE: 98 F | OXYGEN SATURATION: 96 % | WEIGHT: 145 LBS

## 2023-09-05 DIAGNOSIS — K51.919 ULCERATIVE COLITIS WITH COMPLICATION, UNSPECIFIED LOCATION: Primary | ICD-10-CM

## 2023-09-05 PROCEDURE — 63600175 PHARM REV CODE 636 W HCPCS: Performed by: NURSE PRACTITIONER

## 2023-09-05 PROCEDURE — 25000003 PHARM REV CODE 250: Performed by: NURSE PRACTITIONER

## 2023-09-05 PROCEDURE — 96375 TX/PRO/DX INJ NEW DRUG ADDON: CPT

## 2023-09-05 PROCEDURE — 96365 THER/PROPH/DIAG IV INF INIT: CPT

## 2023-09-05 RX ORDER — ACETAMINOPHEN 325 MG/1
650 TABLET ORAL
Status: CANCELLED | OUTPATIENT
Start: 2023-10-31

## 2023-09-05 RX ORDER — SODIUM CHLORIDE 0.9 % (FLUSH) 0.9 %
10 SYRINGE (ML) INJECTION
Status: DISCONTINUED | OUTPATIENT
Start: 2023-09-05 | End: 2023-09-05 | Stop reason: HOSPADM

## 2023-09-05 RX ORDER — DIPHENHYDRAMINE HYDROCHLORIDE 50 MG/ML
25 INJECTION INTRAMUSCULAR; INTRAVENOUS
Status: CANCELLED | OUTPATIENT
Start: 2023-10-31

## 2023-09-05 RX ORDER — METHYLPREDNISOLONE SOD SUCC 125 MG
125 VIAL (EA) INJECTION
Status: COMPLETED | OUTPATIENT
Start: 2023-09-05 | End: 2023-09-05

## 2023-09-05 RX ORDER — ACETAMINOPHEN 325 MG/1
650 TABLET ORAL
Status: ACTIVE | OUTPATIENT
Start: 2023-09-05 | End: 2023-09-05

## 2023-09-05 RX ORDER — IPRATROPIUM BROMIDE AND ALBUTEROL SULFATE 2.5; .5 MG/3ML; MG/3ML
3 SOLUTION RESPIRATORY (INHALATION)
Status: CANCELLED | OUTPATIENT
Start: 2023-10-31

## 2023-09-05 RX ORDER — METHYLPREDNISOLONE SOD SUCC 125 MG
40 VIAL (EA) INJECTION
Status: CANCELLED | OUTPATIENT
Start: 2023-10-31

## 2023-09-05 RX ORDER — METHYLPREDNISOLONE SOD SUCC 125 MG
40 VIAL (EA) INJECTION
Status: ACTIVE | OUTPATIENT
Start: 2023-09-05 | End: 2023-09-05

## 2023-09-05 RX ORDER — DIPHENHYDRAMINE HYDROCHLORIDE 50 MG/ML
25 INJECTION INTRAMUSCULAR; INTRAVENOUS
Status: ACTIVE | OUTPATIENT
Start: 2023-09-05 | End: 2023-09-05

## 2023-09-05 RX ORDER — ACETAMINOPHEN 325 MG/1
650 TABLET ORAL ONCE
Status: COMPLETED | OUTPATIENT
Start: 2023-09-05 | End: 2023-09-05

## 2023-09-05 RX ORDER — SODIUM CHLORIDE 0.9 % (FLUSH) 0.9 %
10 SYRINGE (ML) INJECTION
Status: CANCELLED | OUTPATIENT
Start: 2023-10-31

## 2023-09-05 RX ORDER — IPRATROPIUM BROMIDE AND ALBUTEROL SULFATE 2.5; .5 MG/3ML; MG/3ML
3 SOLUTION RESPIRATORY (INHALATION)
Status: ACTIVE | OUTPATIENT
Start: 2023-09-05 | End: 2023-09-05

## 2023-09-05 RX ORDER — EPINEPHRINE 1 MG/ML
0.3 INJECTION, SOLUTION, CONCENTRATE INTRAVENOUS
Status: CANCELLED | OUTPATIENT
Start: 2023-10-31

## 2023-09-05 RX ORDER — METHYLPREDNISOLONE SOD SUCC 125 MG
125 VIAL (EA) INJECTION
Status: CANCELLED
Start: 2023-10-31

## 2023-09-05 RX ORDER — ACETAMINOPHEN 325 MG/1
650 TABLET ORAL ONCE
Status: CANCELLED | OUTPATIENT
Start: 2023-10-31 | End: 2023-10-31

## 2023-09-05 RX ORDER — EPINEPHRINE CONVENIENCE KIT 1 MG/ML(1)
0.3 KIT INTRAMUSCULAR; SUBCUTANEOUS
Status: ACTIVE | OUTPATIENT
Start: 2023-09-05 | End: 2023-09-05

## 2023-09-05 RX ADMIN — ACETAMINOPHEN 650 MG: 325 TABLET ORAL at 12:09

## 2023-09-05 RX ADMIN — VEDOLIZUMAB 300 MG: 300 INJECTION, POWDER, LYOPHILIZED, FOR SOLUTION INTRAVENOUS at 12:09

## 2023-09-05 RX ADMIN — DIPHENHYDRAMINE HYDROCHLORIDE 25 MG: 50 INJECTION INTRAMUSCULAR; INTRAVENOUS at 12:09

## 2023-09-05 RX ADMIN — METHYLPREDNISOLONE SODIUM SUCCINATE 125 MG: 125 INJECTION, POWDER, FOR SOLUTION INTRAMUSCULAR; INTRAVENOUS at 12:09

## 2023-09-05 NOTE — PROGRESS NOTES
1155 Pt here for Entyvio infusion, resting in recliner, TP released and pharmacy notified    Pre meds given and tolerated (Solumedrol 125mg IV, Benadryl 25mg IV, Tylenol 625mg PO)   1231 Entvio infusion started to go in over 30 min  1300 Entvio infusion complete, tolerated well, will continue to monitor  1315 pt discharged ambulatory with no adverse reaction noted, pt notified to go to ER with any adverse reactions, AVS given along with medication information  Follow up appt made for 8 weeks

## 2023-10-12 ENCOUNTER — OFFICE VISIT (OUTPATIENT)
Dept: GASTROENTEROLOGY | Facility: CLINIC | Age: 78
End: 2023-10-12
Payer: MEDICARE

## 2023-10-12 VITALS
WEIGHT: 148.19 LBS | HEART RATE: 71 BPM | DIASTOLIC BLOOD PRESSURE: 70 MMHG | BODY MASS INDEX: 27.27 KG/M2 | SYSTOLIC BLOOD PRESSURE: 132 MMHG | HEIGHT: 62 IN

## 2023-10-12 DIAGNOSIS — K51.919 ULCERATIVE COLITIS WITH COMPLICATION, UNSPECIFIED LOCATION: Primary | ICD-10-CM

## 2023-10-12 DIAGNOSIS — R19.7 DIARRHEA, UNSPECIFIED TYPE: ICD-10-CM

## 2023-10-12 DIAGNOSIS — R11.0 NAUSEA: ICD-10-CM

## 2023-10-12 DIAGNOSIS — K21.9 GASTROESOPHAGEAL REFLUX DISEASE, UNSPECIFIED WHETHER ESOPHAGITIS PRESENT: ICD-10-CM

## 2023-10-12 DIAGNOSIS — D50.0 IRON DEFICIENCY ANEMIA DUE TO CHRONIC BLOOD LOSS: ICD-10-CM

## 2023-10-12 PROCEDURE — 99214 PR OFFICE/OUTPT VISIT, EST, LEVL IV, 30-39 MIN: ICD-10-PCS | Mod: S$PBB,,, | Performed by: NURSE PRACTITIONER

## 2023-10-12 PROCEDURE — 99213 OFFICE O/P EST LOW 20 MIN: CPT | Mod: PBBFAC | Performed by: NURSE PRACTITIONER

## 2023-10-12 PROCEDURE — 99214 OFFICE O/P EST MOD 30 MIN: CPT | Mod: S$PBB,,, | Performed by: NURSE PRACTITIONER

## 2023-10-12 RX ORDER — DAPAGLIFLOZIN 5 MG/1
5 TABLET, FILM COATED ORAL DAILY
COMMUNITY

## 2023-10-12 NOTE — PROGRESS NOTES
Jose Russell is a 78 y.o. female here for Follow-up        PCP: Alyssa Michele  Referring Provider: No referring provider defined for this encounter.     HPI:  Presents for follow-up appointment due to ulcerative colitis.  Today the patient is reporting that this week that she has been fatigued with low-grade fever and some nausea.  No vomiting.  Appetite is decreased.  Reports that she did go out of town for a wedding last weekend.  We did discuss the possibility of COVID infection.  Daughter states that the week prior to the patient becoming ill that she did have a sore throat nasal congestion and cough.  Is no longer having hematochezia.  Also reports that she is able to have a bowel movement without taking MiraLax powder.  States that overall lower abdominal discomfort is much improved.  She does have iron-deficiency anemia.  No recent labs through primary care.  Last H and H on 06/13, HGB 11.5 and HCT 35.9, CRP is 1.47.  The patient also reports that her mesalamine is not covered well by insurance.  States that her co-pay has been over 1200 dollars in the last couple of months.  She did ask the insurance regarding changing to Lialda instead of Asacol, reports that the Lialda is not covered at all by insurance.  Would like to try to wean off of mesalamine.  She continues to get Entyvio infusions 300 mg every 8 weeks.  She did have a bone density study but I do not have those results available for review. Last flex sig 11/15/22, Diverticula are present in the sigmoid and descending colon. The rectum has inflammation and ulceration. One distal rectal polypoid nodule was biopsied.    Follow-up  Associated symptoms include fatigue. Pertinent negatives include no abdominal pain, change in bowel habit, chest pain, fever, joint swelling, nausea, rash, vomiting or weakness.         ROS:  Review of Systems   Constitutional:  Positive for fatigue. Negative for appetite change, fever and unexpected weight change.    HENT:  Negative for trouble swallowing.    Respiratory:  Negative for shortness of breath.    Cardiovascular:  Negative for chest pain.   Gastrointestinal:  Negative for abdominal pain, anal bleeding, blood in stool, change in bowel habit, constipation, diarrhea, nausea, rectal pain, vomiting, reflux and fecal incontinence.   Musculoskeletal:  Negative for back pain, gait problem and joint swelling.   Integumentary:  Negative for pallor and rash.   Neurological:  Negative for dizziness, weakness and light-headedness.   Hematological:  Does not bruise/bleed easily.   Psychiatric/Behavioral:  Positive for sleep disturbance. The patient is not nervous/anxious.           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    PSHX:  has a past surgical history that includes Colonoscopy (06/12/2020); Esophagogastroduodenoscopy (05/24/2018); Hysterectomy; Cholecystectomy; and Nephrectomy.    PFHX: family history is not on file.    PSlHX:  reports that she has never smoked. She has never used smokeless tobacco. She reports that she does not drink alcohol and does not use drugs.        Review of patient's allergies indicates:   Allergen Reactions    Levofloxacin in d5w     Mercaptopurine analogues (thiopurines)     Penicillins     Sulfa (sulfonamide antibiotics)        Medication List with Changes/Refills   Current Medications    ALLOPURINOL (ZYLOPRIM) 100 MG TABLET    Take 200 mg by mouth once daily.    ASPIRIN (ECOTRIN) 81 MG EC TABLET    Take 81 mg by mouth once daily.    ATENOLOL (TENORMIN) 25 MG TABLET    Take 25 mg by mouth once daily.    AZITHROMYCIN (ZITHROMAX Z-AKHIL) 250 MG TABLET    Take two  tablets today and then one tablet daily for four days    BIFIDOBACTERIUM INFANTIS (ALIGN ORAL)    Take by mouth once daily.    CITALOPRAM (CELEXA) 20 MG TABLET    Take 20 mg by mouth once daily.    CLOBETASOL (TEMOVATE) 0.05 % EXTERNAL SOLUTION    Apply to AA on scalp BID PRN flares    DAPAGLIFLOZIN PROPANEDIOL (FARXIGA) 5 MG TAB TABLET    Take 5 mg by mouth once daily.    ESOMEPRAZOLE (NEXIUM) 40 MG CAPSULE    Take 40 mg by mouth once daily.    GLYBURIDE (DIABETA) 5 MG TABLET    Take 5 mg by mouth 2 (two) times daily with meals.    HYDRALAZINE (APRESOLINE) 50 MG TABLET    Take 50 mg by mouth once daily.    ISOSORBIDE MONONITRATE (IMDUR) 30 MG 24 HR TABLET    Take 30 mg by mouth once daily.    KETOCONAZOLE (NIZORAL) 2 % SHAMPOO    Use as a scalp treatment 2-3 times a week for maintenance, massaging into scalp and leaving on for up to 3 minutes before rinsing    LEVOTHYROXINE (SYNTHROID) 50 MCG TABLET    Take 50 mcg by mouth before breakfast.    LORATADINE (CLARITIN) 10 MG TABLET    Take 10 mg by mouth once daily.    MAGNESIUM OXIDE (MAG-OX) 400 MG (241.3 MG MAGNESIUM) TABLET    Take 1 tablet by mouth.    MESALAMINE (ASACOL HD) 800 MG TBEC    Take 3 tablets (2,400 mg total) by mouth 2 (two) times a day.    METFORMIN (GLUCOPHAGE) 500 MG TABLET    Take 500 mg by mouth 2 (two) times daily with meals.    NITROGLYCERIN (NITROSTAT) 0.4 MG SL TABLET    Place 0.4 mg under the tongue every 5 (five) minutes as needed for Chest pain.    OMEGA-3 FATTY ACIDS/FISH OIL (FISH OIL-OMEGA-3 FATTY ACIDS) 300-1,000 MG CAPSULE    Take by mouth once daily.    ONDANSETRON (ZOFRAN) 4 MG TABLET    Take 1 tablet (4 mg total) by mouth every 6 (six) hours.    POLYETHYLENE GLYCOL (GLYCOLAX) 17 GRAM PWPK    Take by mouth.    PRAVASTATIN (PRAVACHOL) 40 MG TABLET    Take 40 mg by mouth once daily.    SUCRALFATE (CARAFATE) 1 GRAM TABLET    Take 1 tablet (1 g total) by mouth 4 (four) times daily as needed (Epigastric pain).    TORSEMIDE (DEMADEX) 20  "MG TAB    Take 10 mg by mouth once daily.    VEDOLIZUMAB (ENTYVIO IV)    Inject 300 mg into the vein every 8 weeks.    VERAPAMIL (VERELAN) 240 MG C24P    Take 240 mg by mouth once daily.    VITAMIN D (VITAMIN D3) 1000 UNITS TAB    Take 5,000 Units by mouth once daily.        Objective Findings:  Vital Signs:  /70   Pulse 71   Ht 5' 2" (1.575 m)   Wt 67.2 kg (148 lb 3.2 oz)   BMI 27.11 kg/m²  Body mass index is 27.11 kg/m².    Physical Exam:  Physical Exam  Vitals and nursing note reviewed.   Constitutional:       General: She is not in acute distress.     Appearance: Normal appearance.   HENT:      Mouth/Throat:      Mouth: Mucous membranes are moist.   Cardiovascular:      Rate and Rhythm: Normal rate.   Pulmonary:      Effort: Pulmonary effort is normal.      Breath sounds: No wheezing, rhonchi or rales.   Abdominal:      General: Bowel sounds are normal. There is no distension.      Palpations: Abdomen is soft. There is no mass.      Tenderness: There is no abdominal tenderness. There is no guarding.   Skin:     General: Skin is warm and dry.      Coloration: Skin is not jaundiced or pale.   Neurological:      Mental Status: She is alert and oriented to person, place, and time.   Psychiatric:         Mood and Affect: Mood normal.          Labs:  Lab Results   Component Value Date    WBC 7.38 06/13/2023    HGB 11.5 (L) 06/13/2023    HCT 35.9 (L) 06/13/2023    MCV 93.7 06/13/2023    RDW 14.3 06/13/2023     06/13/2023    LYMPH 18.2 (L) 06/13/2023    LYMPH 1.34 06/13/2023    MONO 6.5 (H) 06/13/2023    EOS 0.18 06/13/2023    BASO 0.03 06/13/2023     Lab Results   Component Value Date     06/13/2023    K 4.0 06/13/2023     06/13/2023    CO2 28 06/13/2023     (H) 06/13/2023    BUN 14 06/13/2023    CREATININE 0.91 06/13/2023    CALCIUM 10.6 (H) 06/13/2023    PROT 7.0 06/13/2023    ALBUMIN 3.5 06/13/2023    BILITOT 0.3 06/13/2023    ALKPHOS 134 06/13/2023    AST 28 06/13/2023    ALT 24 " 06/13/2023         Imaging: No results found.      Assessment:  Jose Russell is a 78 y.o. female here with:  1. Ulcerative colitis with complication, unspecified location    2. Gastroesophageal reflux disease, unspecified whether esophagitis present    3. Diarrhea, unspecified type    4. Nausea    5. Iron deficiency anemia due to chronic blood loss          Recommendations:  1. Patient reports she will check for COVID at home  2. CBC, CMP, CRP, iron studies, stool for calprotectin  3. Request bone density study from [facility]  4. Consider weaning off Asacol  5. Follow up in one month  6. Avoid NSAID's    No follow-ups on file.      Order summary:  Orders Placed This Encounter    CBC Auto Differential    C-Reactive Protein    Comprehensive Metabolic Panel    Calprotectin, Stool    Iron and TIBC    Ferritin       Thank you for allowing me to participate in the care of Jose Russell.      CHRIS Abdi

## 2023-10-17 ENCOUNTER — TELEPHONE (OUTPATIENT)
Dept: GASTROENTEROLOGY | Facility: CLINIC | Age: 78
End: 2023-10-17
Payer: MEDICARE

## 2023-10-17 NOTE — TELEPHONE ENCOUNTER
Called patient to notify that Johana Corbin NP says she may stop her Asacol and continue with Entyvio infusions and to call back if s/s of flare occurs. Follow up made for 2 months. Patient states she just got the Asacol refilled and it was expensive and she was afraid to come off of it cold turkey, and would feel better if she could step it down. Johana Corbin NP said this would be ok. Patient verbalized understanding.              ----- Message from MELANIA Noel sent at 10/17/2023  4:15 PM CDT -----  Patient may stop Asacol and continue with Entyvio infusions.  If she has any increase abdominal pain, hematochezia or other signs of UC flare she should call back to our office.  Ensure follow-up in about 2 months if not sooner.

## 2023-10-30 ENCOUNTER — INFUSION (OUTPATIENT)
Dept: INFUSION THERAPY | Facility: HOSPITAL | Age: 78
End: 2023-10-30
Attending: NURSE PRACTITIONER
Payer: MEDICARE

## 2023-10-30 VITALS
OXYGEN SATURATION: 96 % | DIASTOLIC BLOOD PRESSURE: 72 MMHG | RESPIRATION RATE: 18 BRPM | SYSTOLIC BLOOD PRESSURE: 160 MMHG | TEMPERATURE: 98 F | HEART RATE: 67 BPM

## 2023-10-30 DIAGNOSIS — K51.919 ULCERATIVE COLITIS WITH COMPLICATION, UNSPECIFIED LOCATION: Primary | ICD-10-CM

## 2023-10-30 PROCEDURE — 25000003 PHARM REV CODE 250: Performed by: NURSE PRACTITIONER

## 2023-10-30 PROCEDURE — 63600175 PHARM REV CODE 636 W HCPCS: Mod: JZ,JG | Performed by: NURSE PRACTITIONER

## 2023-10-30 PROCEDURE — 96365 THER/PROPH/DIAG IV INF INIT: CPT

## 2023-10-30 RX ORDER — METHYLPREDNISOLONE SOD SUCC 125 MG
125 VIAL (EA) INJECTION
Status: COMPLETED | OUTPATIENT
Start: 2023-10-30 | End: 2023-10-30

## 2023-10-30 RX ORDER — IPRATROPIUM BROMIDE AND ALBUTEROL SULFATE 2.5; .5 MG/3ML; MG/3ML
3 SOLUTION RESPIRATORY (INHALATION)
Status: CANCELLED | OUTPATIENT
Start: 2023-12-25

## 2023-10-30 RX ORDER — DIPHENHYDRAMINE HYDROCHLORIDE 50 MG/ML
25 INJECTION INTRAMUSCULAR; INTRAVENOUS
Status: CANCELLED | OUTPATIENT
Start: 2023-12-25

## 2023-10-30 RX ORDER — EPINEPHRINE 1 MG/ML
0.3 INJECTION, SOLUTION, CONCENTRATE INTRAVENOUS
Status: CANCELLED | OUTPATIENT
Start: 2023-12-25

## 2023-10-30 RX ORDER — SODIUM CHLORIDE 0.9 % (FLUSH) 0.9 %
10 SYRINGE (ML) INJECTION
Status: CANCELLED | OUTPATIENT
Start: 2023-12-25

## 2023-10-30 RX ORDER — DIPHENHYDRAMINE HYDROCHLORIDE 50 MG/ML
25 INJECTION INTRAMUSCULAR; INTRAVENOUS
Status: ACTIVE | OUTPATIENT
Start: 2023-10-30 | End: 2023-10-30

## 2023-10-30 RX ORDER — METHYLPREDNISOLONE SOD SUCC 125 MG
125 VIAL (EA) INJECTION
Status: CANCELLED
Start: 2023-12-25

## 2023-10-30 RX ORDER — METHYLPREDNISOLONE SOD SUCC 125 MG
40 VIAL (EA) INJECTION
Status: CANCELLED | OUTPATIENT
Start: 2023-12-25

## 2023-10-30 RX ORDER — ACETAMINOPHEN 325 MG/1
650 TABLET ORAL
Status: CANCELLED | OUTPATIENT
Start: 2023-12-25

## 2023-10-30 RX ORDER — ACETAMINOPHEN 325 MG/1
650 TABLET ORAL ONCE
Status: CANCELLED | OUTPATIENT
Start: 2023-12-25 | End: 2023-12-25

## 2023-10-30 RX ORDER — SODIUM CHLORIDE 0.9 % (FLUSH) 0.9 %
10 SYRINGE (ML) INJECTION
Status: DISCONTINUED | OUTPATIENT
Start: 2023-10-30 | End: 2023-10-30 | Stop reason: HOSPADM

## 2023-10-30 RX ORDER — ACETAMINOPHEN 325 MG/1
650 TABLET ORAL ONCE
Status: DISCONTINUED | OUTPATIENT
Start: 2023-10-30 | End: 2023-10-30 | Stop reason: HOSPADM

## 2023-10-30 RX ADMIN — METHYLPREDNISOLONE SODIUM SUCCINATE 125 MG: 125 INJECTION, POWDER, FOR SOLUTION INTRAMUSCULAR; INTRAVENOUS at 12:10

## 2023-10-30 RX ADMIN — DIPHENHYDRAMINE HYDROCHLORIDE 25 MG: 50 INJECTION, SOLUTION INTRAMUSCULAR; INTRAVENOUS at 12:10

## 2023-10-30 RX ADMIN — VEDOLIZUMAB 300 MG: 300 INJECTION, POWDER, LYOPHILIZED, FOR SOLUTION INTRAVENOUS at 12:10

## 2023-10-30 NOTE — PROGRESS NOTES
1205 Pt here for Entyvio infusion, resting in recliner, TP released and pharmacy notified.   1226 Entyvio started infusing for 30 minutes.   1257 Infusion complete, tolerated well. Will continue to monitor.   1320 Pt discharged ambulatory with no adverse reaction noted. AVS given along with medication information. Pt notified to go to ER with any adverse reactions, verbalized understanding. Follow appointment scheduled for December.

## 2023-11-01 ENCOUNTER — TELEPHONE (OUTPATIENT)
Dept: GASTROENTEROLOGY | Facility: CLINIC | Age: 78
End: 2023-11-01
Payer: MEDICARE

## 2023-11-01 NOTE — TELEPHONE ENCOUNTER
Results called to patient. Verbalized understanding. Patient states she did have to start back taking her budesonide after being off of it for approximately 12-14 days.

## 2023-11-27 ENCOUNTER — HOSPITAL ENCOUNTER (EMERGENCY)
Facility: HOSPITAL | Age: 78
Discharge: HOME | End: 2023-11-27
Payer: MEDICARE

## 2023-11-27 VITALS
HEIGHT: 62 IN | TEMPERATURE: 98 F | RESPIRATION RATE: 18 BRPM | WEIGHT: 142.5 LBS | BODY MASS INDEX: 26.22 KG/M2 | OXYGEN SATURATION: 97 % | SYSTOLIC BLOOD PRESSURE: 129 MMHG | DIASTOLIC BLOOD PRESSURE: 59 MMHG | HEART RATE: 62 BPM

## 2023-11-27 DIAGNOSIS — E83.42 HYPOMAGNESEMIA: ICD-10-CM

## 2023-11-27 DIAGNOSIS — K52.9 GASTROENTERITIS: Primary | ICD-10-CM

## 2023-11-27 LAB
ANION GAP SERPL CALCULATED.3IONS-SCNC: 11 MMOL/L (ref 7–16)
BASOPHILS # BLD AUTO: 0.03 K/UL (ref 0–0.2)
BASOPHILS NFR BLD AUTO: 0.6 % (ref 0–1)
BUN SERPL-MCNC: 18 MG/DL (ref 7–18)
BUN/CREAT SERPL: 15 (ref 6–20)
CALCIUM SERPL-MCNC: 10.8 MG/DL (ref 8.5–10.1)
CHLORIDE SERPL-SCNC: 108 MMOL/L (ref 98–107)
CO2 SERPL-SCNC: 23 MMOL/L (ref 21–32)
CREAT SERPL-MCNC: 1.23 MG/DL (ref 0.55–1.02)
DIFFERENTIAL METHOD BLD: ABNORMAL
EGFR (NO RACE VARIABLE) (RUSH/TITUS): 45 ML/MIN/1.73M2
EOSINOPHIL # BLD AUTO: 0.18 K/UL (ref 0–0.5)
EOSINOPHIL NFR BLD AUTO: 3.7 % (ref 1–4)
ERYTHROCYTE [DISTWIDTH] IN BLOOD BY AUTOMATED COUNT: 13.4 % (ref 11.5–14.5)
GLUCOSE SERPL-MCNC: 212 MG/DL (ref 74–106)
HCT VFR BLD AUTO: 40.3 % (ref 38–47)
HGB BLD-MCNC: 13.7 G/DL (ref 12–16)
IMM GRANULOCYTES # BLD AUTO: 0.02 K/UL (ref 0–0.04)
IMM GRANULOCYTES NFR BLD: 0.4 % (ref 0–0.4)
LYMPHOCYTES # BLD AUTO: 1.03 K/UL (ref 1–4.8)
LYMPHOCYTES NFR BLD AUTO: 21.1 % (ref 27–41)
MAGNESIUM SERPL-MCNC: 1.3 MG/DL (ref 1.7–2.3)
MCH RBC QN AUTO: 29.8 PG (ref 27–31)
MCHC RBC AUTO-ENTMCNC: 34 G/DL (ref 32–36)
MCV RBC AUTO: 87.6 FL (ref 80–96)
MONOCYTES # BLD AUTO: 0.37 K/UL (ref 0–0.8)
MONOCYTES NFR BLD AUTO: 7.6 % (ref 2–6)
MPC BLD CALC-MCNC: 10.1 FL (ref 9.4–12.4)
NEUTROPHILS # BLD AUTO: 3.26 K/UL (ref 1.8–7.7)
NEUTROPHILS NFR BLD AUTO: 66.6 % (ref 53–65)
NRBC # BLD AUTO: 0 X10E3/UL
NRBC, AUTO (.00): 0 %
PLATELET # BLD AUTO: 230 K/UL (ref 150–400)
POTASSIUM SERPL-SCNC: 3.6 MMOL/L (ref 3.5–5.1)
RBC # BLD AUTO: 4.6 M/UL (ref 4.2–5.4)
SODIUM SERPL-SCNC: 138 MMOL/L (ref 136–145)
WBC # BLD AUTO: 4.89 K/UL (ref 4.5–11)

## 2023-11-27 PROCEDURE — 87045 FECES CULTURE AEROBIC BACT: CPT | Performed by: NURSE PRACTITIONER

## 2023-11-27 PROCEDURE — 96366 THER/PROPH/DIAG IV INF ADDON: CPT

## 2023-11-27 PROCEDURE — 87899 AGENT NOS ASSAY W/OPTIC: CPT | Performed by: NURSE PRACTITIONER

## 2023-11-27 PROCEDURE — 25000003 PHARM REV CODE 250: Performed by: NURSE PRACTITIONER

## 2023-11-27 PROCEDURE — 99284 PR EMERGENCY DEPT VISIT,LEVEL IV: ICD-10-PCS | Mod: ,,, | Performed by: NURSE PRACTITIONER

## 2023-11-27 PROCEDURE — 96365 THER/PROPH/DIAG IV INF INIT: CPT

## 2023-11-27 PROCEDURE — 99284 EMERGENCY DEPT VISIT MOD MDM: CPT | Mod: 25

## 2023-11-27 PROCEDURE — 89055 LEUKOCYTE ASSESSMENT FECAL: CPT | Performed by: NURSE PRACTITIONER

## 2023-11-27 PROCEDURE — 83735 ASSAY OF MAGNESIUM: CPT | Performed by: NURSE PRACTITIONER

## 2023-11-27 PROCEDURE — 87493 C DIFF AMPLIFIED PROBE: CPT | Performed by: NURSE PRACTITIONER

## 2023-11-27 PROCEDURE — 63600175 PHARM REV CODE 636 W HCPCS: Performed by: NURSE PRACTITIONER

## 2023-11-27 PROCEDURE — 85025 COMPLETE CBC W/AUTO DIFF WBC: CPT | Performed by: NURSE PRACTITIONER

## 2023-11-27 PROCEDURE — 99284 EMERGENCY DEPT VISIT MOD MDM: CPT | Mod: ,,, | Performed by: NURSE PRACTITIONER

## 2023-11-27 PROCEDURE — 80048 BASIC METABOLIC PNL TOTAL CA: CPT | Performed by: NURSE PRACTITIONER

## 2023-11-27 RX ORDER — MAGNESIUM SULFATE HEPTAHYDRATE 40 MG/ML
2 INJECTION, SOLUTION INTRAVENOUS
Status: COMPLETED | OUTPATIENT
Start: 2023-11-27 | End: 2023-11-27

## 2023-11-27 RX ORDER — ONDANSETRON 4 MG/1
4 TABLET, ORALLY DISINTEGRATING ORAL EVERY 6 HOURS PRN
Qty: 10 TABLET | Refills: 0 | Status: SHIPPED | OUTPATIENT
Start: 2023-11-27

## 2023-11-27 RX ORDER — CALCIUM CARBONATE 300MG(750)
1 TABLET,CHEWABLE ORAL 2 TIMES DAILY
Qty: 30 TABLET | Refills: 0 | Status: SHIPPED | OUTPATIENT
Start: 2023-11-27

## 2023-11-27 RX ADMIN — MAGNESIUM SULFATE HEPTAHYDRATE 2 G: 40 INJECTION, SOLUTION INTRAVENOUS at 01:11

## 2023-11-27 RX ADMIN — SODIUM CHLORIDE 1000 ML: 9 INJECTION, SOLUTION INTRAVENOUS at 12:11

## 2023-11-27 NOTE — DISCHARGE INSTRUCTIONS
Drink plenty of fluids.  Take magnesium as directed.  Follow up with your primary care provider in 2 days. Return to the emergency department for any increase in symptoms or for any other new or worrisome symptoms.

## 2023-11-27 NOTE — ED PROVIDER NOTES
Encounter Date: 11/27/2023       History     Chief Complaint   Patient presents with    Abdominal Pain     78 year old female presents to the emergency department to be evaluated for nausea, vomiting and diarrhea that began 1 week ago. She vomited once yesterday. She has been having diarrhea about 15 times per day over the last week.     The history is provided by the patient.   Diarrhea   This is a new problem. The current episode started several days ago. The problem occurs more than 10 times per day. Associated symptoms include bloating and vomiting. Pertinent negatives include no abdominal pain, arthralgias, chills, coughing, fever, headaches, increased  flatus, myalgias, sweats, URI or weight loss.     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  Social History     Tobacco Use    Smoking status: Never    Smokeless tobacco: Never   Substance Use Topics    Alcohol use: Never    Drug use: Never     Review of Systems   Constitutional:  Negative for chills, fever and weight loss.   Respiratory:  Negative for cough.    Gastrointestinal:  Positive for bloating, diarrhea, nausea and vomiting. Negative for abdominal pain and flatus.   Musculoskeletal:  Negative  for arthralgias and myalgias.   Neurological:  Negative for headaches.   All other systems reviewed and are negative.      Physical Exam     Initial Vitals [11/27/23 1031]   BP Pulse Resp Temp SpO2   (!) 148/72 78 18 97.9 °F (36.6 °C) 97 %      MAP       --         Physical Exam    Vitals reviewed.  Constitutional: She appears well-developed and well-nourished.   Cardiovascular:  Normal rate and regular rhythm.           Pulmonary/Chest: Breath sounds normal.   Abdominal: Abdomen is soft. Bowel sounds are normal. She exhibits no distension and no mass. There is no abdominal tenderness. There is no rebound and no guarding.   Musculoskeletal:         General: Normal range of motion.     Neurological: She is alert and oriented to person, place, and time. She has normal strength. GCS score is 15. GCS eye subscore is 4. GCS verbal subscore is 5. GCS motor subscore is 6.   Skin: Skin is warm and dry.         Medical Screening Exam   See Full Note    ED Course   Procedures  Labs Reviewed   BASIC METABOLIC PANEL - Abnormal; Notable for the following components:       Result Value    Chloride 108 (*)     Glucose 212 (*)     Creatinine 1.23 (*)     Calcium 10.8 (*)     eGFR 45 (*)     All other components within normal limits   MAGNESIUM - Abnormal; Notable for the following components:    Magnesium 1.3 (*)     All other components within normal limits   CBC WITH DIFFERENTIAL - Abnormal; Notable for the following components:    Neutrophils % 66.6 (*)     Lymphocytes % 21.1 (*)     Monocytes % 7.6 (*)     All other components within normal limits   FECAL LEUKOCYTES   CULTURE, STOOL   CBC W/ AUTO DIFFERENTIAL    Narrative:     The following orders were created for panel order CBC auto differential.  Procedure                               Abnormality         Status                     ---------                               -----------         ------                     CBC with Differential[[id number]]       Abnormal             Final result                 Please view results for these tests on the individual orders.          Imaging Results    None          Medications   sodium chloride 0.9% bolus 1,000 mL 1,000 mL (1,000 mLs Intravenous New Bag 11/27/23 1252)   magnesium sulfate 2g in water 50mL IVPB (premix) (2 g Intravenous New Bag 11/27/23 1308)     Medical Decision Making  78 year old female presents to the emergency department to be evaluated for nausea, vomiting and diarrhea that began 1 week ago. She vomited once yesterday. She has been having diarrhea about 15 times per day over the last week.   Labs ordered and reviewed.  Treated with magnesium and normal saline in the emergency department.  Patient was improved.  Diagnosis: Gastroenteritis, hypomagnesemia  Stool studies are pending  Prescribed Zofran and magnesium    Amount and/or Complexity of Data Reviewed  Labs: ordered.    Risk  OTC drugs.  Prescription drug management.                                      Clinical Impression:   Final diagnoses:  [K52.9] Gastroenteritis (Primary)  [E83.42] Hypomagnesemia        ED Disposition Condition    Discharge Stable          ED Prescriptions       Medication Sig Dispense Start Date End Date Auth. Provider    magnesium oxide 400 mg magnesium Tab Take 1 tablet by mouth 2 (two) times a day. 30 tablet 11/27/2023 -- Mary Ellen Palm FNP    ondansetron (ZOFRAN-ODT) 4 MG TbDL Take 1 tablet (4 mg total) by mouth every 6 (six) hours as needed. 10 tablet 11/27/2023 -- Mary Ellen Palm FNP          Follow-up Information    None          Mary Ellen Palm FNP  11/27/23 1312

## 2023-11-28 ENCOUNTER — TELEPHONE (OUTPATIENT)
Dept: EMERGENCY MEDICINE | Facility: HOSPITAL | Age: 78
End: 2023-11-28
Payer: MEDICARE

## 2023-11-28 LAB
CAMPYLOBACTER ANTIGEN: NEGATIVE
EHEC (SHIGA TOXIN 1): NORMAL
EHEC (SHIGA TOXIN 2): NORMAL
FECAL LEUKOCYTES: NORMAL /HPF

## 2023-11-29 LAB — SALM+SHIG+E COLI ETEC STL CULT: NORMAL

## 2023-12-05 ENCOUNTER — TELEPHONE (OUTPATIENT)
Dept: GASTROENTEROLOGY | Facility: CLINIC | Age: 78
End: 2023-12-05
Payer: MEDICARE

## 2023-12-05 DIAGNOSIS — K51.919 ULCERATIVE COLITIS WITH COMPLICATION, UNSPECIFIED LOCATION: Primary | ICD-10-CM

## 2023-12-05 NOTE — TELEPHONE ENCOUNTER
Returned call to patient. States she wanted to let Johana Corbin NP know that she was in the ER on Monday, and she could look at the record. Patient complaining of watery diarrhea that started on 11/18/23. Patient states they gave her IVF and replaced her magnesium in the ER, but did not do any imaging or stool studies. Asked patient if she had any recent antibiotic therapy for anything. Patient states that she did take some antibiotics around the first of November for a UTI. Informed patient that I would speak with Johana Corbin NP and let her know what she says.        Spoke with Johana Corbin NP. She is ordering stool studies to check for c diff. And also a CRP level. Instructed patient to turn in stool sample and report to lab for blood draw. Keep previously scheduled appointment. Verbalized understanding.              ----- Message from Bibiana Doe sent at 12/4/2023  1:01 PM CST -----  Pt daughter Eloisa called about stomach Issues having. I see where she has appt on 12/19...

## 2023-12-14 ENCOUNTER — TELEPHONE (OUTPATIENT)
Dept: GASTROENTEROLOGY | Facility: CLINIC | Age: 78
End: 2023-12-14
Payer: MEDICARE

## 2023-12-19 ENCOUNTER — OFFICE VISIT (OUTPATIENT)
Dept: GASTROENTEROLOGY | Facility: CLINIC | Age: 78
End: 2023-12-19
Payer: MEDICARE

## 2023-12-19 VITALS
HEART RATE: 63 BPM | WEIGHT: 145 LBS | SYSTOLIC BLOOD PRESSURE: 141 MMHG | DIASTOLIC BLOOD PRESSURE: 70 MMHG | HEIGHT: 62 IN | BODY MASS INDEX: 26.68 KG/M2

## 2023-12-19 DIAGNOSIS — K51.919 ULCERATIVE COLITIS WITH COMPLICATION, UNSPECIFIED LOCATION: ICD-10-CM

## 2023-12-19 DIAGNOSIS — R19.7 DIARRHEA, UNSPECIFIED TYPE: Primary | ICD-10-CM

## 2023-12-19 PROCEDURE — 99214 OFFICE O/P EST MOD 30 MIN: CPT | Mod: S$PBB,,, | Performed by: NURSE PRACTITIONER

## 2023-12-19 PROCEDURE — 99215 OFFICE O/P EST HI 40 MIN: CPT | Mod: PBBFAC | Performed by: NURSE PRACTITIONER

## 2023-12-19 PROCEDURE — 99214 PR OFFICE/OUTPT VISIT, EST, LEVL IV, 30-39 MIN: ICD-10-PCS | Mod: S$PBB,,, | Performed by: NURSE PRACTITIONER

## 2023-12-19 RX ORDER — METFORMIN HYDROCHLORIDE 1000 MG/1
1000 TABLET ORAL 2 TIMES DAILY WITH MEALS
COMMUNITY

## 2023-12-19 RX ORDER — BUDESONIDE 3 MG/1
3 CAPSULE, COATED PELLETS ORAL DAILY
Qty: 14 CAPSULE | Refills: 0 | Status: SHIPPED | OUTPATIENT
Start: 2023-12-19 | End: 2024-01-02

## 2023-12-19 RX ORDER — METRONIDAZOLE 500 MG/1
500 TABLET ORAL EVERY 12 HOURS
Qty: 28 TABLET | Refills: 0 | Status: SHIPPED | OUTPATIENT
Start: 2023-12-19 | End: 2024-01-02

## 2023-12-19 NOTE — PROGRESS NOTES
Jose Russell is a 78 y.o. female here for Follow-up        PCP: Alyssa Michele  Referring Provider: No referring provider defined for this encounter.     HPI:  Presents for follow-up due to ulcerative colitis.  Patient reports increased diarrhea stools.  States that she sometimes has 8 9 in more stools per day.  Reports that stools are pure liquid.  She has had a recent increase in Mag oxide.  Is now taking 800 mg daily.  Advised that magnesium may increased diarrhea stools.  I did do stool studies on 12/06/2023, calprotectin was less than 50, CRP is normal, HGB is 13.7 and HCT is 40.3.  Iron is normal.  She denies any hematochezia.  She is also taking metformin.  Reports that with diarrhea that she has had episodes of fecal incontinence.  She is taking Entyvio infusions.  Also is on Asacol.  States that she is compliant with Asacol.  She is due for an Entyvio infusion next week.  Reports that she is also going to follow up with primary care on Thursday for magnesium levels.  States that she has had palpitations in is going to have a cardiac monitor placed.  She is followed by Dr. Hobbs.  I did discuss that she should follow up with Cardiology regarding magnesium and the possible increase in diarrhea.  She had a flex sig on 11/15/2022, acutely inflamed polypoid lesion.  The patient is hesitant about the possibility of changing biologic therapy.  I did discuss that with calprotectin being normal that diarrhea may be attributed to medications.    Follow-up  Pertinent negatives include no abdominal pain, change in bowel habit, chest pain, fatigue, fever, nausea or vomiting.         ROS:  Review of Systems   Constitutional:  Negative for appetite change, fatigue, fever and unexpected weight change.   HENT:  Negative for trouble swallowing.    Cardiovascular:  Positive for palpitations. Negative for chest pain.   Gastrointestinal:  Positive for diarrhea and fecal incontinence. Negative for abdominal pain, blood  in stool, change in bowel habit, constipation, nausea, vomiting and reflux.   Musculoskeletal:  Negative for gait problem.   Integumentary:  Negative for pallor.   Psychiatric/Behavioral:  The patient is not nervous/anxious.           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    PSHX:  has a past surgical history that includes Colonoscopy (06/12/2020); Esophagogastroduodenoscopy (05/24/2018); Hysterectomy; Cholecystectomy; and Nephrectomy.    PFHX: family history is not on file.    PSlHX:  reports that she has never smoked. She has never used smokeless tobacco. She reports that she does not drink alcohol and does not use drugs.        Review of patient's allergies indicates:   Allergen Reactions    Levofloxacin in d5w     Mercaptopurine analogues (thiopurines)     Penicillins     Sulfa (sulfonamide antibiotics)        Medication List with Changes/Refills   New Medications    BUDESONIDE (ENTOCORT EC) 3 MG CAPSULE    Take 1 capsule (3 mg total) by mouth once daily. for 14 days    METRONIDAZOLE (FLAGYL) 500 MG TABLET    Take 1 tablet (500 mg total) by mouth every 12 (twelve) hours. for 14 days   Current Medications    ALLOPURINOL (ZYLOPRIM) 100 MG TABLET    Take 200 mg by mouth once daily.    ASPIRIN (ECOTRIN) 81 MG EC TABLET    Take 81 mg by mouth once daily.    ATENOLOL (TENORMIN) 25 MG TABLET    Take 25 mg by mouth once daily.    AZITHROMYCIN (ZITHROMAX Z-AKHIL) 250 MG TABLET    Take two tablets today and then one tablet daily for four days    BIFIDOBACTERIUM INFANTIS (ALIGN ORAL)    Take by mouth once daily.    CITALOPRAM (CELEXA) 20 MG TABLET    Take 20 mg by mouth once daily.     CLOBETASOL (TEMOVATE) 0.05 % EXTERNAL SOLUTION    Apply to AA on scalp BID PRN flares    DAPAGLIFLOZIN PROPANEDIOL (FARXIGA) 5 MG TAB TABLET    Take 5 mg by mouth once daily.    ESOMEPRAZOLE (NEXIUM) 40 MG CAPSULE    Take 40 mg by mouth once daily.    GLYBURIDE (DIABETA) 5 MG TABLET    Take 5 mg by mouth 2 (two) times daily with meals.    HYDRALAZINE (APRESOLINE) 50 MG TABLET    Take 50 mg by mouth once daily.    ISOSORBIDE MONONITRATE (IMDUR) 30 MG 24 HR TABLET    Take 30 mg by mouth once daily.    KETOCONAZOLE (NIZORAL) 2 % SHAMPOO    Use as a scalp treatment 2-3 times a week for maintenance, massaging into scalp and leaving on for up to 3 minutes before rinsing    LEVOTHYROXINE (SYNTHROID) 50 MCG TABLET    Take 50 mcg by mouth before breakfast.    LORATADINE (CLARITIN) 10 MG TABLET    Take 10 mg by mouth once daily.    MAGNESIUM OXIDE (MAG-OX) 400 MG (241.3 MG MAGNESIUM) TABLET    Take 1 tablet by mouth.    MAGNESIUM OXIDE 400 MG MAGNESIUM TAB    Take 1 tablet by mouth 2 (two) times a day.    MESALAMINE (ASACOL HD) 800 MG TBEC    Take 3 tablets (2,400 mg total) by mouth 2 (two) times a day.    METFORMIN (GLUCOPHAGE) 1000 MG TABLET    Take 1,000 mg by mouth 2 (two) times daily with meals.    METFORMIN (GLUCOPHAGE) 500 MG TABLET    Take 500 mg by mouth 2 (two) times daily with meals.    NITROGLYCERIN (NITROSTAT) 0.4 MG SL TABLET    Place 0.4 mg under the tongue every 5 (five) minutes as needed for Chest pain.    OMEGA-3 FATTY ACIDS/FISH OIL (FISH OIL-OMEGA-3 FATTY ACIDS) 300-1,000 MG CAPSULE    Take by mouth once daily.    ONDANSETRON (ZOFRAN) 4 MG TABLET    Take 1 tablet (4 mg total) by mouth every 6 (six) hours.    ONDANSETRON (ZOFRAN-ODT) 4 MG TBDL    Take 1 tablet (4 mg total) by mouth every 6 (six) hours as needed.    POLYETHYLENE GLYCOL (GLYCOLAX) 17 GRAM PWPK    Take by mouth.    PRAVASTATIN (PRAVACHOL) 40 MG TABLET    Take 40 mg by mouth once daily.    SUCRALFATE (CARAFATE) 1 GRAM TABLET    Take 1 tablet (1  "g total) by mouth 4 (four) times daily as needed (Epigastric pain).    TORSEMIDE (DEMADEX) 20 MG TAB    Take 10 mg by mouth once daily.    VEDOLIZUMAB (ENTYVIO IV)    Inject 300 mg into the vein every 8 weeks.    VERAPAMIL (VERELAN) 240 MG C24P    Take 240 mg by mouth once daily.    VITAMIN D (VITAMIN D3) 1000 UNITS TAB    Take 5,000 Units by mouth once daily.        Objective Findings:  Vital Signs:  BP (!) 141/70   Pulse 63   Ht 5' 2" (1.575 m)   Wt 65.8 kg (145 lb)   BMI 26.52 kg/m²  Body mass index is 26.52 kg/m².    Physical Exam:  Physical Exam  Vitals and nursing note reviewed.   Constitutional:       General: She is not in acute distress.     Appearance: Normal appearance.   HENT:      Mouth/Throat:      Mouth: Mucous membranes are moist.   Cardiovascular:      Rate and Rhythm: Normal rate.   Pulmonary:      Effort: Pulmonary effort is normal.      Breath sounds: No wheezing, rhonchi or rales.   Abdominal:      General: Bowel sounds are normal. There is no distension.      Palpations: Abdomen is soft. There is no mass.      Tenderness: There is no abdominal tenderness. There is no guarding.   Skin:     General: Skin is warm and dry.      Coloration: Skin is not jaundiced or pale.   Neurological:      Mental Status: She is alert and oriented to person, place, and time.   Psychiatric:         Mood and Affect: Mood normal.          Labs:  Lab Results   Component Value Date    WBC 4.89 11/27/2023    HGB 13.7 11/27/2023    HCT 40.3 11/27/2023    MCV 87.6 11/27/2023    RDW 13.4 11/27/2023     11/27/2023    LYMPH 21.1 (L) 11/27/2023    LYMPH 1.03 11/27/2023    MONO 7.6 (H) 11/27/2023    EOS 0.18 11/27/2023    BASO 0.03 11/27/2023     Lab Results   Component Value Date     11/27/2023    K 3.6 11/27/2023     (H) 11/27/2023    CO2 23 11/27/2023     (H) 11/27/2023    BUN 18 11/27/2023    CREATININE 1.23 (H) 11/27/2023    CALCIUM 10.8 (H) 11/27/2023    PROT 7.4 10/12/2023    ALBUMIN 3.8 " 10/12/2023    BILITOT 0.3 10/12/2023    ALKPHOS 123 10/12/2023    AST 14 (L) 10/12/2023    ALT 30 10/12/2023         Imaging: No results found.      Assessment:  Jose Russell is a 78 y.o. female here with:  1. Diarrhea, unspecified type    2. Ulcerative colitis with complication, unspecified location          Recommendations:  1. We will discuss with Dr. Salcido  2. Budesonide 3 mg daily for 2 weeks  3. Flagyl 500 mg twice daily for 14 days  4. Patient will follow up with primary care on Thursday for labs including magnesium level  5. Recommend that she discuss metformin as well as magnesium is possibly contributing to her increased diarrhea with a normal calprotectin    Follow up in about 4 weeks (around 1/16/2024).      Order summary:  Orders Placed This Encounter    budesonide (ENTOCORT EC) 3 mg capsule    metroNIDAZOLE (FLAGYL) 500 MG tablet       Thank you for allowing me to participate in the care of Jose Russell.      CHRIS Abdi

## 2023-12-29 ENCOUNTER — INFUSION (OUTPATIENT)
Dept: INFUSION THERAPY | Facility: HOSPITAL | Age: 78
End: 2023-12-29
Attending: NURSE PRACTITIONER
Payer: MEDICARE

## 2023-12-29 VITALS
DIASTOLIC BLOOD PRESSURE: 65 MMHG | OXYGEN SATURATION: 95 % | HEART RATE: 65 BPM | SYSTOLIC BLOOD PRESSURE: 146 MMHG | RESPIRATION RATE: 18 BRPM

## 2023-12-29 DIAGNOSIS — K51.919 ULCERATIVE COLITIS WITH COMPLICATION, UNSPECIFIED LOCATION: Primary | ICD-10-CM

## 2023-12-29 PROCEDURE — 96375 TX/PRO/DX INJ NEW DRUG ADDON: CPT

## 2023-12-29 PROCEDURE — 96365 THER/PROPH/DIAG IV INF INIT: CPT

## 2023-12-29 PROCEDURE — 25000003 PHARM REV CODE 250: Performed by: NURSE PRACTITIONER

## 2023-12-29 PROCEDURE — 63600175 PHARM REV CODE 636 W HCPCS: Mod: JZ,JG | Performed by: NURSE PRACTITIONER

## 2023-12-29 RX ORDER — ACETAMINOPHEN 325 MG/1
650 TABLET ORAL ONCE
Status: COMPLETED | OUTPATIENT
Start: 2023-12-29 | End: 2023-12-29

## 2023-12-29 RX ORDER — METHYLPREDNISOLONE SOD SUCC 125 MG
125 VIAL (EA) INJECTION
Status: CANCELLED
Start: 2024-02-23

## 2023-12-29 RX ORDER — METHYLPREDNISOLONE SOD SUCC 125 MG
40 VIAL (EA) INJECTION
Status: ACTIVE | OUTPATIENT
Start: 2023-12-29 | End: 2023-12-29

## 2023-12-29 RX ORDER — DIPHENHYDRAMINE HYDROCHLORIDE 50 MG/ML
25 INJECTION INTRAMUSCULAR; INTRAVENOUS
Status: DISPENSED | OUTPATIENT
Start: 2023-12-29 | End: 2023-12-29

## 2023-12-29 RX ORDER — ACETAMINOPHEN 325 MG/1
650 TABLET ORAL
Status: ACTIVE | OUTPATIENT
Start: 2023-12-29 | End: 2023-12-29

## 2023-12-29 RX ORDER — IPRATROPIUM BROMIDE AND ALBUTEROL SULFATE 2.5; .5 MG/3ML; MG/3ML
3 SOLUTION RESPIRATORY (INHALATION)
Status: ACTIVE | OUTPATIENT
Start: 2023-12-29 | End: 2023-12-29

## 2023-12-29 RX ORDER — METHYLPREDNISOLONE SOD SUCC 125 MG
125 VIAL (EA) INJECTION
Status: COMPLETED | OUTPATIENT
Start: 2023-12-29 | End: 2023-12-29

## 2023-12-29 RX ORDER — SODIUM CHLORIDE 0.9 % (FLUSH) 0.9 %
10 SYRINGE (ML) INJECTION
Status: DISCONTINUED | OUTPATIENT
Start: 2023-12-29 | End: 2023-12-29 | Stop reason: HOSPADM

## 2023-12-29 RX ORDER — EPINEPHRINE CONVENIENCE KIT 1 MG/ML(1)
0.3 KIT INTRAMUSCULAR; SUBCUTANEOUS
Status: ACTIVE | OUTPATIENT
Start: 2023-12-29 | End: 2023-12-29

## 2023-12-29 RX ADMIN — DIPHENHYDRAMINE HYDROCHLORIDE 25 MG: 50 INJECTION, SOLUTION INTRAMUSCULAR; INTRAVENOUS at 10:12

## 2023-12-29 RX ADMIN — VEDOLIZUMAB 300 MG: 300 INJECTION, POWDER, LYOPHILIZED, FOR SOLUTION INTRAVENOUS at 11:12

## 2023-12-29 RX ADMIN — METHYLPREDNISOLONE SODIUM SUCCINATE 125 MG: 125 INJECTION, POWDER, FOR SOLUTION INTRAMUSCULAR; INTRAVENOUS at 10:12

## 2023-12-29 RX ADMIN — ACETAMINOPHEN 650 MG: 325 TABLET ORAL at 10:12

## 2023-12-29 NOTE — PROGRESS NOTES
1016 Patient arrived for entyvio infusion today ambulatory to bay. Therapy plan released and pharmacy notified.   Premedications given : 125mg solu-medrol iv , benadryl 25mg iv , 650mg tylenol PO   1101 infusion started  1139 infusion finished  1139 Int flushed   1145Patient discharged , given AVS and told to watch for signs and symptoms of adverse reactions , if had any to go to the ER. Patient given upcoming appointment

## 2024-01-18 ENCOUNTER — OFFICE VISIT (OUTPATIENT)
Dept: GASTROENTEROLOGY | Facility: CLINIC | Age: 79
End: 2024-01-18
Payer: MEDICARE

## 2024-01-18 VITALS
HEART RATE: 70 BPM | SYSTOLIC BLOOD PRESSURE: 136 MMHG | HEIGHT: 62 IN | WEIGHT: 145.81 LBS | DIASTOLIC BLOOD PRESSURE: 74 MMHG | BODY MASS INDEX: 26.83 KG/M2

## 2024-01-18 DIAGNOSIS — K51.919 ULCERATIVE COLITIS WITH COMPLICATION, UNSPECIFIED LOCATION: Primary | ICD-10-CM

## 2024-01-18 PROCEDURE — 99214 OFFICE O/P EST MOD 30 MIN: CPT | Mod: S$PBB,,, | Performed by: NURSE PRACTITIONER

## 2024-01-18 PROCEDURE — 99215 OFFICE O/P EST HI 40 MIN: CPT | Mod: PBBFAC | Performed by: NURSE PRACTITIONER

## 2024-01-18 NOTE — PROGRESS NOTES
Jose Russell is a 78 y.o. female here for Follow-up        PCP: Alyssa Michele  Referring Provider: No referring provider defined for this encounter.     HPI:  Presents for follow-up due to ulcerative colitis with diarrhea.  Recent diarrhea was attributed to medication changes.  Enteric pathogens and C diff were negative.  No fecal leukocytes.  Patient reports that diarrhea is much improved.  Patient does continue to take metformin.  Magnesium has been reduced to 400 mg daily.  Reports that since reducing the magnesium that diarrhea has improved.  States that she does not have diarrhea daily.  No hematochezia or melena.  Calprotectin on 12/06/23 was normal, CRP normal, no anemia. Iron studies are normal. She had a flex sig on 11/15/2022, acutely inflamed polypoid lesion.  Patient is currently taking Asacol and Entyvio.  Previously tried to wean off of mesalamine but was unable to.  Last Entyvio infusion was 12/29/2023.  She did bring the bone density study that was performed at [facility] on 07/19/2023.  Osteoporosis.  We did discuss that she should follow-up with primary care for evaluation of osteoporosis and possible replacement.  Is currently taking vitamin-D.    Follow-up  Pertinent negatives include no abdominal pain, change in bowel habit, chest pain, fatigue, fever, joint swelling, nausea or vomiting.         ROS:  Review of Systems   Constitutional:  Negative for appetite change, fatigue, fever and unexpected weight change.   HENT:  Negative for trouble swallowing.    Cardiovascular:  Negative for chest pain.   Gastrointestinal:  Positive for diarrhea. Negative for abdominal pain, blood in stool, change in bowel habit, constipation, nausea, rectal pain (improved), vomiting and reflux.   Musculoskeletal:  Negative for back pain, gait problem and joint swelling.   Integumentary:  Negative for pallor.   Hematological:  Does not bruise/bleed easily.   Psychiatric/Behavioral:  The patient is not  nervous/anxious.           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    PSHX:  has a past surgical history that includes Colonoscopy (06/12/2020); Esophagogastroduodenoscopy (05/24/2018); Hysterectomy; Cholecystectomy; and Nephrectomy.    PFHX: family history is not on file.    PSlHX:  reports that she has never smoked. She has never used smokeless tobacco. She reports that she does not drink alcohol and does not use drugs.        Review of patient's allergies indicates:   Allergen Reactions    Levofloxacin in d5w     Mercaptopurine analogues (thiopurines)     Penicillins     Sulfa (sulfonamide antibiotics)        Medication List with Changes/Refills   Current Medications    ALLOPURINOL (ZYLOPRIM) 100 MG TABLET    Take 200 mg by mouth once daily.    ASPIRIN (ECOTRIN) 81 MG EC TABLET    Take 81 mg by mouth once daily.    ATENOLOL (TENORMIN) 25 MG TABLET    Take 25 mg by mouth once daily.    AZITHROMYCIN (ZITHROMAX Z-AKHIL) 250 MG TABLET    Take two tablets today and then one tablet daily for four days    BIFIDOBACTERIUM INFANTIS (ALIGN ORAL)    Take by mouth once daily.    CITALOPRAM (CELEXA) 20 MG TABLET    Take 20 mg by mouth once daily.    CLOBETASOL (TEMOVATE) 0.05 % EXTERNAL SOLUTION    Apply to AA on scalp BID PRN flares    DAPAGLIFLOZIN PROPANEDIOL (FARXIGA) 5 MG TAB TABLET    Take 5 mg by mouth once daily.    ESOMEPRAZOLE (NEXIUM) 40 MG CAPSULE    Take 40 mg by mouth once daily.    GLYBURIDE (DIABETA) 5 MG TABLET    Take 5 mg by mouth 2 (two) times daily with meals.    HYDRALAZINE (APRESOLINE) 50 MG TABLET    Take 50 mg by mouth once daily.    ISOSORBIDE MONONITRATE (IMDUR) 30 MG  "24 HR TABLET    Take 30 mg by mouth once daily.    KETOCONAZOLE (NIZORAL) 2 % SHAMPOO    Use as a scalp treatment 2-3 times a week for maintenance, massaging into scalp and leaving on for up to 3 minutes before rinsing    LEVOTHYROXINE (SYNTHROID) 50 MCG TABLET    Take 50 mcg by mouth before breakfast.    LORATADINE (CLARITIN) 10 MG TABLET    Take 10 mg by mouth once daily.    MAGNESIUM OXIDE (MAG-OX) 400 MG (241.3 MG MAGNESIUM) TABLET    Take 1 tablet by mouth.    MAGNESIUM OXIDE 400 MG MAGNESIUM TAB    Take 1 tablet by mouth 2 (two) times a day.    MESALAMINE (ASACOL HD) 800 MG TBEC    Take 3 tablets (2,400 mg total) by mouth 2 (two) times a day.    METFORMIN (GLUCOPHAGE) 1000 MG TABLET    Take 1,000 mg by mouth 2 (two) times daily with meals.    METFORMIN (GLUCOPHAGE) 500 MG TABLET    Take 500 mg by mouth 2 (two) times daily with meals.    NITROGLYCERIN (NITROSTAT) 0.4 MG SL TABLET    Place 0.4 mg under the tongue every 5 (five) minutes as needed for Chest pain.    OMEGA-3 FATTY ACIDS/FISH OIL (FISH OIL-OMEGA-3 FATTY ACIDS) 300-1,000 MG CAPSULE    Take by mouth once daily.    ONDANSETRON (ZOFRAN) 4 MG TABLET    Take 1 tablet (4 mg total) by mouth every 6 (six) hours.    ONDANSETRON (ZOFRAN-ODT) 4 MG TBDL    Take 1 tablet (4 mg total) by mouth every 6 (six) hours as needed.    POLYETHYLENE GLYCOL (GLYCOLAX) 17 GRAM PWPK    Take by mouth.    PRAVASTATIN (PRAVACHOL) 40 MG TABLET    Take 40 mg by mouth once daily.    SUCRALFATE (CARAFATE) 1 GRAM TABLET    Take 1 tablet (1 g total) by mouth 4 (four) times daily as needed (Epigastric pain).    TORSEMIDE (DEMADEX) 20 MG TAB    Take 10 mg by mouth once daily.    VEDOLIZUMAB (ENTYVIO IV)    Inject 300 mg into the vein every 8 weeks.    VERAPAMIL (VERELAN) 240 MG C24P    Take 240 mg by mouth once daily.    VITAMIN D (VITAMIN D3) 1000 UNITS TAB    Take 5,000 Units by mouth once daily.        Objective Findings:  Vital Signs:  /74   Pulse 70   Ht 5' 2" (1.575 m)   " Wt 66.1 kg (145 lb 12.8 oz)   BMI 26.67 kg/m²  Body mass index is 26.67 kg/m².    Physical Exam:  Physical Exam  Vitals and nursing note reviewed.   Constitutional:       General: She is not in acute distress.     Appearance: Normal appearance.   HENT:      Mouth/Throat:      Mouth: Mucous membranes are moist.   Cardiovascular:      Rate and Rhythm: Normal rate.   Pulmonary:      Effort: Pulmonary effort is normal.      Breath sounds: No wheezing, rhonchi or rales.   Abdominal:      General: Bowel sounds are normal. There is no distension.      Palpations: Abdomen is soft. There is no mass.      Tenderness: There is no abdominal tenderness. There is no guarding.      Hernia: No hernia is present.   Skin:     General: Skin is warm and dry.      Coloration: Skin is not jaundiced or pale.   Neurological:      Mental Status: She is alert and oriented to person, place, and time.   Psychiatric:         Mood and Affect: Mood normal.          Labs:  Lab Results   Component Value Date    WBC 4.89 11/27/2023    HGB 13.7 11/27/2023    HCT 40.3 11/27/2023    MCV 87.6 11/27/2023    RDW 13.4 11/27/2023     11/27/2023    LYMPH 21.1 (L) 11/27/2023    LYMPH 1.03 11/27/2023    MONO 7.6 (H) 11/27/2023    EOS 0.18 11/27/2023    BASO 0.03 11/27/2023     Lab Results   Component Value Date     11/27/2023    K 3.6 11/27/2023     (H) 11/27/2023    CO2 23 11/27/2023     (H) 11/27/2023    BUN 18 11/27/2023    CREATININE 1.23 (H) 11/27/2023    CALCIUM 10.8 (H) 11/27/2023    PROT 7.4 10/12/2023    ALBUMIN 3.8 10/12/2023    BILITOT 0.3 10/12/2023    ALKPHOS 123 10/12/2023    AST 14 (L) 10/12/2023    ALT 30 10/12/2023         Imaging: No results found.      Assessment:  Jose Russell is a 78 y.o. female here with:  1. Ulcerative colitis with complication, unspecified location          Recommendations:  1. Avoid NSAIDs.  2. May continue Entyvio and mesalamine as scheduled  3. Patient will call back for  appointment    No follow-ups on file.      Order summary:       Thank you for allowing me to participate in the care of Jose Russell.      CHRIS Abdi

## 2024-02-26 ENCOUNTER — INFUSION (OUTPATIENT)
Dept: INFUSION THERAPY | Facility: HOSPITAL | Age: 79
End: 2024-02-26
Attending: INTERNAL MEDICINE
Payer: MEDICARE

## 2024-02-26 VITALS
RESPIRATION RATE: 17 BRPM | TEMPERATURE: 98 F | HEART RATE: 58 BPM | SYSTOLIC BLOOD PRESSURE: 139 MMHG | BODY MASS INDEX: 26.52 KG/M2 | WEIGHT: 145 LBS | DIASTOLIC BLOOD PRESSURE: 59 MMHG | OXYGEN SATURATION: 97 %

## 2024-02-26 DIAGNOSIS — K51.919 ULCERATIVE COLITIS WITH COMPLICATION, UNSPECIFIED LOCATION: Primary | ICD-10-CM

## 2024-02-26 PROCEDURE — 63600175 PHARM REV CODE 636 W HCPCS: Performed by: NURSE PRACTITIONER

## 2024-02-26 PROCEDURE — 25000003 PHARM REV CODE 250: Performed by: INTERNAL MEDICINE

## 2024-02-26 PROCEDURE — 63600175 PHARM REV CODE 636 W HCPCS: Performed by: INTERNAL MEDICINE

## 2024-02-26 PROCEDURE — 96365 THER/PROPH/DIAG IV INF INIT: CPT

## 2024-02-26 PROCEDURE — 96375 TX/PRO/DX INJ NEW DRUG ADDON: CPT

## 2024-02-26 RX ORDER — DIPHENHYDRAMINE HYDROCHLORIDE 50 MG/ML
25 INJECTION INTRAMUSCULAR; INTRAVENOUS
Status: DISPENSED | OUTPATIENT
Start: 2024-02-26 | End: 2024-02-26

## 2024-02-26 RX ORDER — METHYLPREDNISOLONE SOD SUCC 125 MG
40 VIAL (EA) INJECTION
Status: ACTIVE | OUTPATIENT
Start: 2024-02-26 | End: 2024-02-26

## 2024-02-26 RX ORDER — IPRATROPIUM BROMIDE AND ALBUTEROL SULFATE 2.5; .5 MG/3ML; MG/3ML
3 SOLUTION RESPIRATORY (INHALATION)
Status: ACTIVE | OUTPATIENT
Start: 2024-02-26 | End: 2024-02-26

## 2024-02-26 RX ORDER — SODIUM CHLORIDE 0.9 % (FLUSH) 0.9 %
10 SYRINGE (ML) INJECTION
Status: DISCONTINUED | OUTPATIENT
Start: 2024-02-26 | End: 2024-02-26 | Stop reason: HOSPADM

## 2024-02-26 RX ORDER — METHYLPREDNISOLONE SOD SUCC 125 MG
125 VIAL (EA) INJECTION
Status: COMPLETED | OUTPATIENT
Start: 2024-02-26 | End: 2024-02-26

## 2024-02-26 RX ORDER — METHYLPREDNISOLONE SOD SUCC 125 MG
125 VIAL (EA) INJECTION
Status: CANCELLED
Start: 2024-04-15

## 2024-02-26 RX ORDER — ACETAMINOPHEN 325 MG/1
650 TABLET ORAL ONCE
Status: COMPLETED | OUTPATIENT
Start: 2024-02-26 | End: 2024-02-26

## 2024-02-26 RX ORDER — EPINEPHRINE 1 MG/ML
0.3 INJECTION, SOLUTION, CONCENTRATE INTRAVENOUS
Status: ACTIVE | OUTPATIENT
Start: 2024-02-26 | End: 2024-02-26

## 2024-02-26 RX ORDER — ACETAMINOPHEN 325 MG/1
650 TABLET ORAL
Status: ACTIVE | OUTPATIENT
Start: 2024-02-26 | End: 2024-02-26

## 2024-02-26 RX ADMIN — METHYLPREDNISOLONE SODIUM SUCCINATE 125 MG: 125 INJECTION, POWDER, FOR SOLUTION INTRAMUSCULAR; INTRAVENOUS at 12:02

## 2024-02-26 RX ADMIN — DIPHENHYDRAMINE HYDROCHLORIDE 25 MG: 50 INJECTION INTRAMUSCULAR; INTRAVENOUS at 12:02

## 2024-02-26 RX ADMIN — ACETAMINOPHEN 650 MG: 325 TABLET ORAL at 12:02

## 2024-02-26 RX ADMIN — VEDOLIZUMAB 300 MG: 300 INJECTION, POWDER, LYOPHILIZED, FOR SOLUTION INTRAVENOUS at 12:02

## 2024-02-26 NOTE — PROGRESS NOTES
Patient arrived for entivyo infusion today ambulatory to bay. Therapy plan released and pharmacy notified.   1231 infusion started  1310 infusion finished  1311 Int flushed   1330 Patient discharged , given AVS and told to watch for signs and symptoms of adverse reactions , if had any to go to the ER. Patient given upcoming appointment

## 2024-04-22 ENCOUNTER — INFUSION (OUTPATIENT)
Dept: INFUSION THERAPY | Facility: HOSPITAL | Age: 79
End: 2024-04-22
Attending: NURSE PRACTITIONER
Payer: MEDICARE

## 2024-04-22 VITALS
RESPIRATION RATE: 17 BRPM | WEIGHT: 145 LBS | TEMPERATURE: 98 F | DIASTOLIC BLOOD PRESSURE: 64 MMHG | HEART RATE: 68 BPM | BODY MASS INDEX: 26.52 KG/M2 | OXYGEN SATURATION: 96 % | SYSTOLIC BLOOD PRESSURE: 163 MMHG

## 2024-04-22 DIAGNOSIS — K51.919 ULCERATIVE COLITIS WITH COMPLICATION, UNSPECIFIED LOCATION: Primary | ICD-10-CM

## 2024-04-22 PROCEDURE — 63600175 PHARM REV CODE 636 W HCPCS: Mod: JZ,JG | Performed by: NURSE PRACTITIONER

## 2024-04-22 PROCEDURE — 96375 TX/PRO/DX INJ NEW DRUG ADDON: CPT

## 2024-04-22 PROCEDURE — 25000003 PHARM REV CODE 250: Performed by: NURSE PRACTITIONER

## 2024-04-22 PROCEDURE — 96365 THER/PROPH/DIAG IV INF INIT: CPT

## 2024-04-22 RX ORDER — EPINEPHRINE 1 MG/ML
0.3 INJECTION INTRAMUSCULAR; INTRAVENOUS; SUBCUTANEOUS
Status: ACTIVE | OUTPATIENT
Start: 2024-04-22 | End: 2024-04-22

## 2024-04-22 RX ORDER — METHYLPREDNISOLONE SOD SUCC 125 MG
125 VIAL (EA) INJECTION
Status: CANCELLED
Start: 2024-06-17

## 2024-04-22 RX ORDER — ACETAMINOPHEN 325 MG/1
650 TABLET ORAL
Status: ACTIVE | OUTPATIENT
Start: 2024-04-22 | End: 2024-04-22

## 2024-04-22 RX ORDER — METHYLPREDNISOLONE SOD SUCC 125 MG
125 VIAL (EA) INJECTION
Status: COMPLETED | OUTPATIENT
Start: 2024-04-22 | End: 2024-04-22

## 2024-04-22 RX ORDER — ACETAMINOPHEN 325 MG/1
650 TABLET ORAL ONCE
Status: COMPLETED | OUTPATIENT
Start: 2024-04-22 | End: 2024-04-22

## 2024-04-22 RX ORDER — IPRATROPIUM BROMIDE AND ALBUTEROL SULFATE 2.5; .5 MG/3ML; MG/3ML
3 SOLUTION RESPIRATORY (INHALATION)
Status: ACTIVE | OUTPATIENT
Start: 2024-04-22 | End: 2024-04-22

## 2024-04-22 RX ORDER — DIPHENHYDRAMINE HYDROCHLORIDE 50 MG/ML
25 INJECTION INTRAMUSCULAR; INTRAVENOUS
Status: ACTIVE | OUTPATIENT
Start: 2024-04-22 | End: 2024-04-22

## 2024-04-22 RX ORDER — METHYLPREDNISOLONE SOD SUCC 125 MG
40 VIAL (EA) INJECTION
Status: ACTIVE | OUTPATIENT
Start: 2024-04-22 | End: 2024-04-22

## 2024-04-22 RX ORDER — SODIUM CHLORIDE 0.9 % (FLUSH) 0.9 %
10 SYRINGE (ML) INJECTION
Status: DISCONTINUED | OUTPATIENT
Start: 2024-04-22 | End: 2024-04-22 | Stop reason: HOSPADM

## 2024-04-22 RX ADMIN — DIPHENHYDRAMINE HYDROCHLORIDE 25 MG: 50 INJECTION INTRAMUSCULAR; INTRAVENOUS at 12:04

## 2024-04-22 RX ADMIN — ACETAMINOPHEN 650 MG: 325 TABLET ORAL at 12:04

## 2024-04-22 RX ADMIN — VEDOLIZUMAB 300 MG: 300 INJECTION, POWDER, LYOPHILIZED, FOR SOLUTION INTRAVENOUS at 12:04

## 2024-04-22 RX ADMIN — METHYLPREDNISOLONE SODIUM SUCCINATE 125 MG: 125 INJECTION, POWDER, FOR SOLUTION INTRAMUSCULAR; INTRAVENOUS at 12:04

## 2024-04-22 NOTE — PROGRESS NOTES
Patient arrived for entivyo infusion today ambulatory to bay.   Int started to right hand   Therapy plan released and pharmacy notified.   Premedication given :  Tylenol 650mg PO  Solumedrol 125mg IV  Benadryl 25mg IVP   1204 infusion started  1240 infusion finished  1240 Int flushed   1255 Patient discharged , given AVS and told to watch for signs and symptoms of adverse reactions , if had any to go to the ER. Patient given upcoming appointment

## 2024-05-09 ENCOUNTER — OFFICE VISIT (OUTPATIENT)
Dept: DERMATOLOGY | Facility: CLINIC | Age: 79
End: 2024-05-09
Payer: MEDICARE

## 2024-05-09 DIAGNOSIS — L65.0 TELOGEN EFFLUVIUM: ICD-10-CM

## 2024-05-09 DIAGNOSIS — L40.9 SCALP PSORIASIS: ICD-10-CM

## 2024-05-09 DIAGNOSIS — L82.0 INFLAMED SEBORRHEIC KERATOSIS: Primary | ICD-10-CM

## 2024-05-09 DIAGNOSIS — D18.01 CHERRY ANGIOMA: ICD-10-CM

## 2024-05-09 DIAGNOSIS — D22.9 MULTIPLE BENIGN NEVI: ICD-10-CM

## 2024-05-09 PROCEDURE — 17110 DESTRUCTION B9 LES UP TO 14: CPT | Mod: ICN,,, | Performed by: STUDENT IN AN ORGANIZED HEALTH CARE EDUCATION/TRAINING PROGRAM

## 2024-05-09 PROCEDURE — 99214 OFFICE O/P EST MOD 30 MIN: CPT | Mod: 25,ICN,, | Performed by: STUDENT IN AN ORGANIZED HEALTH CARE EDUCATION/TRAINING PROGRAM

## 2024-05-09 RX ORDER — CLOBETASOL PROPIONATE 0.05 G/100ML
SHAMPOO TOPICAL 2 TIMES DAILY
Qty: 118 ML | Refills: 11 | Status: SHIPPED | OUTPATIENT
Start: 2024-05-09

## 2024-05-09 NOTE — PROGRESS NOTES
[facility]  Mathew Allen MD    [address] [place] [address]  [phone]    Fax: [phone]    Patient Name: Jose Russell  Medical Record Number: [id number]  PCP: Alyssa Michele FNP  Age: 79 y.o. : 1945  Contact: [phone] (home)     History of Present Illness:     Jose Russell is a 79 y.o.  female here for follow up of scalp psoriasis treated with clobetasol solution and ketoconazole shampoo that has not improved. Patient is concerned with and irritated lesion on right cheek.     The patient has no other concerns today.    Review of Systems:     Unremarkable other than mentioned above.     Physical Exam:     General: Relaxed, oriented, alert    Skin examination of the scalp, face, neck, chest, back, abdomen, upper extremities and lower extremities were normal except for as listed below      Assessment and Plan:     1. Irritated Seborrheic Keratoses (L82.0)  Stuck-on inflamed papules with crust located on the face  Associated diagnoses: Pruritus and Cutaneous Inflammation    Plan: Liquid Nitrogen.  A total of 1 lesions were treated with liquid nitrogen, located on the above listed location.  This procedure was medically necessary because the lesions that were treated were: irritated and itchy. The  patient's consent was obtained including but not limited to risks of crusting, scabbing, blistering, scarring, darker  or lighter pigmentary change, recurrence, incomplete removal and infection.      2. Scalp Psoriasis  - psoriasiform plaques with micaceous scale    Plan:   - clobetasol 0.05% shampoo     Counseling  I counseled patient regarding systemic effects of inflammation from psoriasis, and the association with cardiac disease, metabolic syndrome, depression, and arthritis    3. Telogen effluvium  - diffuse non-scarring thinning of hair     Plan:  - Discussed etiology and prognosis   - Recommend daily multivitamin with vitamin D  - recommend  correcting any underlying issues such as thyroid abnormalities, anemia, or vitamin deficiencies   - Recommend Nutrafol or Viviscal OTC suppliment    4. Cherry Angiomas  - Scattered bright pink papules    Plan: Counseling  I counseled the patient regarding the diagnosis including that cherry angiomas are benign skin growths requiring no treatment. Patients get more as they age.    5. Benign Nevus   - Dome shaped regular papule    Plan: Reassurance.    Counseling.  Monthly self-skin checks to monitor for any changes in moles are recommended.  Contact Office if: Any moles change in size, shape or color; itch, burn or bleed.  Discussed use of sunscreen SPF 30 or great       Return to clinic in 3 months    AVS printed with patient instructions     Mathew Allen MD   Mohs Surgery/Dermatologic Oncology  Dermatology

## 2024-05-09 NOTE — PATIENT INSTRUCTIONS
"Viviscal supplement can get on [employer] for hair loss    Liquid Nitrogen Wound Care     - the areas treated with liquid nitrogen may form sores, blisters, and scabs. This is normal   - if a blister forms, please keep it intact and do not rupture it. It will resolve within a few days   - please keep petrolatum ointment to the area once to twice daily. You can cover with a bandage if you wish, but it is usually not necessary   - the area may be painful for a few days. We recommend ice, or over the counter tylenol or NSAIDs (such as ibuprofen or naproxen, if you are able to take these)   - this may result in a scar or a "hypopigmented" or lighter area of skin. This may or may not resolve with time   - please call our clinic if the lesion does not resolve, as this can be treated again     "

## 2024-05-21 DIAGNOSIS — R11.0 NAUSEA: Primary | ICD-10-CM

## 2024-06-05 ENCOUNTER — HOSPITAL ENCOUNTER (OUTPATIENT)
Dept: RADIOLOGY | Facility: HOSPITAL | Age: 79
Discharge: HOME | End: 2024-06-05
Attending: INTERNAL MEDICINE
Payer: MEDICARE

## 2024-06-05 DIAGNOSIS — R11.0 NAUSEA: ICD-10-CM

## 2024-06-05 PROCEDURE — 78264 GASTRIC EMPTYING IMG STUDY: CPT | Mod: TC

## 2024-06-05 PROCEDURE — A9541 TC99M SULFUR COLLOID: HCPCS | Performed by: INTERNAL MEDICINE

## 2024-06-05 PROCEDURE — 78264 GASTRIC EMPTYING IMG STUDY: CPT | Mod: 26,,, | Performed by: RADIOLOGY

## 2024-06-05 RX ORDER — TECHNETIUM TC 99M SULFUR COLLOID 2 MG
538 KIT MISCELLANEOUS
Status: COMPLETED | OUTPATIENT
Start: 2024-06-05 | End: 2024-06-05

## 2024-06-05 RX ADMIN — TECHNETIUM TC 99M SULFUR COLLOID KIT 0.54 MILLICURIE: KIT at 08:06

## 2024-06-17 ENCOUNTER — INFUSION (OUTPATIENT)
Dept: INFUSION THERAPY | Facility: HOSPITAL | Age: 79
End: 2024-06-17
Attending: NURSE PRACTITIONER
Payer: MEDICARE

## 2024-06-17 VITALS
BODY MASS INDEX: 25.06 KG/M2 | DIASTOLIC BLOOD PRESSURE: 61 MMHG | HEART RATE: 65 BPM | WEIGHT: 137 LBS | RESPIRATION RATE: 17 BRPM | SYSTOLIC BLOOD PRESSURE: 142 MMHG | OXYGEN SATURATION: 96 %

## 2024-06-17 DIAGNOSIS — K51.919 ULCERATIVE COLITIS WITH COMPLICATION, UNSPECIFIED LOCATION: Primary | ICD-10-CM

## 2024-06-17 PROCEDURE — 63600175 PHARM REV CODE 636 W HCPCS: Performed by: NURSE PRACTITIONER

## 2024-06-17 PROCEDURE — 25000003 PHARM REV CODE 250: Performed by: NURSE PRACTITIONER

## 2024-06-17 PROCEDURE — 96365 THER/PROPH/DIAG IV INF INIT: CPT

## 2024-06-17 PROCEDURE — 96375 TX/PRO/DX INJ NEW DRUG ADDON: CPT

## 2024-06-17 RX ORDER — EPINEPHRINE 1 MG/ML
0.3 INJECTION, SOLUTION, CONCENTRATE INTRAVENOUS
Status: ACTIVE | OUTPATIENT
Start: 2024-06-17 | End: 2024-06-17

## 2024-06-17 RX ORDER — METHYLPREDNISOLONE SOD SUCC 125 MG
40 VIAL (EA) INJECTION
Status: ACTIVE | OUTPATIENT
Start: 2024-06-17 | End: 2024-06-17

## 2024-06-17 RX ORDER — IPRATROPIUM BROMIDE AND ALBUTEROL SULFATE 2.5; .5 MG/3ML; MG/3ML
3 SOLUTION RESPIRATORY (INHALATION)
Status: ACTIVE | OUTPATIENT
Start: 2024-06-17 | End: 2024-06-17

## 2024-06-17 RX ORDER — METHYLPREDNISOLONE SOD SUCC 125 MG
125 VIAL (EA) INJECTION
Status: COMPLETED | OUTPATIENT
Start: 2024-06-17 | End: 2024-06-17

## 2024-06-17 RX ORDER — ACETAMINOPHEN 325 MG/1
650 TABLET ORAL ONCE
Status: COMPLETED | OUTPATIENT
Start: 2024-06-17 | End: 2024-06-17

## 2024-06-17 RX ORDER — ACETAMINOPHEN 325 MG/1
650 TABLET ORAL
Status: ACTIVE | OUTPATIENT
Start: 2024-06-17 | End: 2024-06-17

## 2024-06-17 RX ORDER — SODIUM CHLORIDE 0.9 % (FLUSH) 0.9 %
10 SYRINGE (ML) INJECTION
Status: DISCONTINUED | OUTPATIENT
Start: 2024-06-17 | End: 2024-06-17 | Stop reason: HOSPADM

## 2024-06-17 RX ORDER — DIPHENHYDRAMINE HYDROCHLORIDE 50 MG/ML
25 INJECTION INTRAMUSCULAR; INTRAVENOUS
Status: DISPENSED | OUTPATIENT
Start: 2024-06-17 | End: 2024-06-17

## 2024-06-17 RX ADMIN — METHYLPREDNISOLONE SODIUM SUCCINATE 125 MG: 125 INJECTION, POWDER, FOR SOLUTION INTRAMUSCULAR; INTRAVENOUS at 12:06

## 2024-06-17 RX ADMIN — ACETAMINOPHEN 650 MG: 325 TABLET ORAL at 12:06

## 2024-06-17 RX ADMIN — VEDOLIZUMAB 300 MG: 300 INJECTION, POWDER, LYOPHILIZED, FOR SOLUTION INTRAVENOUS at 12:06

## 2024-06-17 RX ADMIN — DIPHENHYDRAMINE HYDROCHLORIDE 25 MG: 50 INJECTION, SOLUTION INTRAMUSCULAR; INTRAVENOUS at 12:06

## 2024-06-17 NOTE — PROGRESS NOTES
1150 Pt here for Entyvio infusion, resting in recliner, TP released and pharmacy notified    Pre meds given and tolerated  1221 Entyvio infusion started to infuse over 30 min  1254 Entyvio infusion complete, tolerated well, will continue to monitor  1310 pt discharged ambulatory with no adverse reaction noted, pt notified to go to ER with any adverse reactions, AVS given along with medication information  Follow up appt made 8 weeks

## 2024-08-12 ENCOUNTER — INFUSION (OUTPATIENT)
Dept: INFUSION THERAPY | Facility: HOSPITAL | Age: 79
End: 2024-08-12
Attending: OBSTETRICS & GYNECOLOGY
Payer: MEDICARE

## 2024-08-12 VITALS
RESPIRATION RATE: 17 BRPM | DIASTOLIC BLOOD PRESSURE: 77 MMHG | SYSTOLIC BLOOD PRESSURE: 168 MMHG | HEART RATE: 70 BPM | OXYGEN SATURATION: 97 %

## 2024-08-12 DIAGNOSIS — K51.919 ULCERATIVE COLITIS WITH COMPLICATION, UNSPECIFIED LOCATION: Primary | ICD-10-CM

## 2024-08-12 PROCEDURE — 96375 TX/PRO/DX INJ NEW DRUG ADDON: CPT

## 2024-08-12 PROCEDURE — 96365 THER/PROPH/DIAG IV INF INIT: CPT

## 2024-08-12 PROCEDURE — 63600175 PHARM REV CODE 636 W HCPCS: Performed by: OBSTETRICS & GYNECOLOGY

## 2024-08-12 PROCEDURE — 25000003 PHARM REV CODE 250: Performed by: OBSTETRICS & GYNECOLOGY

## 2024-08-12 RX ORDER — SODIUM CHLORIDE 0.9 % (FLUSH) 0.9 %
10 SYRINGE (ML) INJECTION
Status: DISCONTINUED | OUTPATIENT
Start: 2024-08-12 | End: 2024-08-12 | Stop reason: HOSPADM

## 2024-08-12 RX ORDER — ACETAMINOPHEN 325 MG/1
650 TABLET ORAL ONCE
Status: COMPLETED | OUTPATIENT
Start: 2024-08-12 | End: 2024-08-12

## 2024-08-12 RX ORDER — DIPHENHYDRAMINE HYDROCHLORIDE 50 MG/ML
25 INJECTION INTRAMUSCULAR; INTRAVENOUS
Status: COMPLETED | OUTPATIENT
Start: 2024-08-12 | End: 2024-08-12

## 2024-08-12 RX ORDER — METHYLPREDNISOLONE SOD SUCC 125 MG
125 VIAL (EA) INJECTION
Status: COMPLETED | OUTPATIENT
Start: 2024-08-12 | End: 2024-08-12

## 2024-08-12 RX ADMIN — ACETAMINOPHEN 650 MG: 325 TABLET ORAL at 12:08

## 2024-08-12 RX ADMIN — VEDOLIZUMAB 300 MG: 300 INJECTION, POWDER, LYOPHILIZED, FOR SOLUTION INTRAVENOUS at 12:08

## 2024-08-12 RX ADMIN — DIPHENHYDRAMINE HYDROCHLORIDE 25 MG: 50 INJECTION, SOLUTION INTRAMUSCULAR; INTRAVENOUS at 12:08

## 2024-08-12 RX ADMIN — METHYLPREDNISOLONE SODIUM SUCCINATE 62.5 MG: 125 INJECTION, POWDER, FOR SOLUTION INTRAMUSCULAR; INTRAVENOUS at 12:08

## 2024-08-12 NOTE — PROGRESS NOTES
1205 Pt here for Entyvio infusion, resting in recliner, TP released and pharmacy notified  1222 Entyvio infusion started to infuse over 30 min  1254 Entyvio infusion complete, tolerated well, will continue to monitor  1310 pt discharged ambulatory with no adverse reaction noted, pt notified to go to ER with any adverse reactions, AVS given along with delayed transfusion reaction information, pt verbalized understanding  Follow up appt made 8 weeks

## 2024-08-27 DIAGNOSIS — K51.919 ULCERATIVE COLITIS WITH COMPLICATION, UNSPECIFIED LOCATION: ICD-10-CM

## 2024-08-27 RX ORDER — MESALAMINE 800 MG/1
2400 TABLET, DELAYED RELEASE ORAL 2 TIMES DAILY
Qty: 540 TABLET | Refills: 3 | Status: SHIPPED | OUTPATIENT
Start: 2024-08-27 | End: 2025-08-27

## 2024-09-18 ENCOUNTER — OFFICE VISIT (OUTPATIENT)
Dept: GASTROENTEROLOGY | Facility: CLINIC | Age: 79
End: 2024-09-18
Payer: MEDICARE

## 2024-09-18 VITALS
HEART RATE: 71 BPM | OXYGEN SATURATION: 96 % | DIASTOLIC BLOOD PRESSURE: 70 MMHG | BODY MASS INDEX: 25.58 KG/M2 | SYSTOLIC BLOOD PRESSURE: 140 MMHG | WEIGHT: 139 LBS | HEIGHT: 62 IN

## 2024-09-18 DIAGNOSIS — R19.7 DIARRHEA, UNSPECIFIED TYPE: ICD-10-CM

## 2024-09-18 DIAGNOSIS — K51.219 ULCERATIVE PROCTITIS WITH COMPLICATION: Primary | ICD-10-CM

## 2024-09-18 DIAGNOSIS — K51.919 ULCERATIVE COLITIS WITH COMPLICATION, UNSPECIFIED LOCATION: ICD-10-CM

## 2024-09-18 PROCEDURE — 99214 OFFICE O/P EST MOD 30 MIN: CPT | Mod: S$PBB,,, | Performed by: NURSE PRACTITIONER

## 2024-09-18 PROCEDURE — 99999 PR PBB SHADOW E&M-EST. PATIENT-LVL V: CPT | Mod: PBBFAC,,, | Performed by: NURSE PRACTITIONER

## 2024-09-18 PROCEDURE — 99215 OFFICE O/P EST HI 40 MIN: CPT | Mod: PBBFAC | Performed by: NURSE PRACTITIONER

## 2024-09-18 NOTE — PROGRESS NOTES
Jose Russell is a 79 y.o. female here for Follow-up        PCP: Alyssa Michele  Referring Provider: No referring provider defined for this encounter.     HPI:  Presents for follow-up due to ulcerative colitis.  Patient is currently receiving Entyvio infusions for ulcerative colitis.  Her last infusion was on 08/12/2024.  Reports that she is tolerating Entyvio.  Endorses diarrhea with multiple stools per day.  Patient is currently taking metformin 1000 mg twice daily as well as mag ox 400 mg daily.  The last calprotectin in December was normal.  CRP on 05/29/2024 normal, HGB 12.4 and HCT 37.3 showing very minimal anemia.  Last colonoscopy was 10/08/2021, mildly active to moderate colitis in the rectum.  Flexible sigmoid was performed on 11/15/2022, report reviewed, acutely inflamed polypoid tissue from the rectum.  The patient denies hematochezia or melena.  We did discuss that repeat colonoscopy is needed to assess for mucosal healing.  The patient agrees to repeat colonoscopy.  We also discussed that we may need to choose an alternate biologic therapy.  The patient has an appointment with Endocrinology next week to discuss changing diabetic medication.  States that metformin does not control blood sugar but feels that this does increase her diarrhea.  Hemorrhoids have improved.  Stool studies has been normal.  She had a gastric emptying study on 06/05/2024 that was normal.  She continues to report occasional nausea.    Follow-up  Associated symptoms include fatigue and nausea. Pertinent negatives include no abdominal pain, change in bowel habit, chest pain, fever or vomiting.         ROS:  Review of Systems   Constitutional:  Positive for fatigue. Negative for appetite change, fever and unexpected weight change.   HENT:  Negative for trouble swallowing.    Respiratory:  Negative for shortness of breath.    Cardiovascular:  Negative for chest pain.   Gastrointestinal:  Positive for diarrhea and nausea.  Negative for abdominal pain, blood in stool, change in bowel habit, constipation, vomiting and reflux.   Musculoskeletal:  Negative for gait problem.   Integumentary:  Negative for pallor.   Psychiatric/Behavioral:  The patient is not nervous/anxious.           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    PSHX:  has a past surgical history that includes Colonoscopy (06/12/2020); Esophagogastroduodenoscopy (05/24/2018); Hysterectomy; Cholecystectomy; and Nephrectomy.    PFHX: family history is not on file.    PSlHX:  reports that she has never smoked. She has never used smokeless tobacco. She reports that she does not drink alcohol and does not use drugs.        Review of patient's allergies indicates:   Allergen Reactions    Levofloxacin in d5w     Mercaptopurine analogues (thiopurines)     Penicillins     Sulfa (sulfonamide antibiotics)        Medication List with Changes/Refills   Current Medications    ALLOPURINOL (ZYLOPRIM) 100 MG TABLET    Take 200 mg by mouth once daily.    ASPIRIN (ECOTRIN) 81 MG EC TABLET    Take 81 mg by mouth once daily.    ATENOLOL (TENORMIN) 25 MG TABLET    Take 25 mg by mouth once daily.    AZITHROMYCIN (ZITHROMAX Z-AKHIL) 250 MG TABLET    Take two tablets today and then one tablet daily for four days    BIFIDOBACTERIUM INFANTIS (ALIGN ORAL)    Take by mouth once daily.    CITALOPRAM (CELEXA) 20 MG TABLET    Take 20 mg by mouth once daily.    CLOBETASOL (CLOBEX) 0.05 % SHAMPOO    Apply topically 2 (two) times daily.    CLOBETASOL (TEMOVATE) 0.05 % EXTERNAL SOLUTION    Apply to AA on scalp BID PRN flares    DAPAGLIFLOZIN PROPANEDIOL (FARXIGA) 5 MG TAB TABLET     Take 5 mg by mouth once daily.    ESOMEPRAZOLE (NEXIUM) 40 MG CAPSULE    Take 40 mg by mouth once daily.    GLYBURIDE (DIABETA) 5 MG TABLET    Take 5 mg by mouth 2 (two) times daily with meals.    HYDRALAZINE (APRESOLINE) 50 MG TABLET    Take 50 mg by mouth once daily.    ISOSORBIDE MONONITRATE (IMDUR) 30 MG 24 HR TABLET    Take 30 mg by mouth once daily.    KETOCONAZOLE (NIZORAL) 2 % SHAMPOO    Use as a scalp treatment 2-3 times a week for maintenance, massaging into scalp and leaving on for up to 3 minutes before rinsing    LEVOTHYROXINE (SYNTHROID) 50 MCG TABLET    Take 50 mcg by mouth before breakfast.    LORATADINE (CLARITIN) 10 MG TABLET    Take 10 mg by mouth once daily.    MAGNESIUM OXIDE (MAG-OX) 400 MG (241.3 MG MAGNESIUM) TABLET    Take 1 tablet by mouth.    MAGNESIUM OXIDE 400 MG MAGNESIUM TAB    Take 1 tablet by mouth 2 (two) times a day.    MESALAMINE (ASACOL HD) 800 MG TBEC    Take 3 tablets (2,400 mg total) by mouth 2 (two) times a day.    METFORMIN (GLUCOPHAGE) 1000 MG TABLET    Take 1,000 mg by mouth 2 (two) times daily with meals.    METFORMIN (GLUCOPHAGE) 500 MG TABLET    Take 500 mg by mouth 2 (two) times daily with meals.    NITROGLYCERIN (NITROSTAT) 0.4 MG SL TABLET    Place 0.4 mg under the tongue every 5 (five) minutes as needed for Chest pain.    ONDANSETRON (ZOFRAN) 4 MG TABLET    Take 1 tablet (4 mg total) by mouth every 6 (six) hours.    ONDANSETRON (ZOFRAN-ODT) 4 MG TBDL    Take 1 tablet (4 mg total) by mouth every 6 (six) hours as needed.    POLYETHYLENE GLYCOL (GLYCOLAX) 17 GRAM PWPK    Take by mouth.    PRAVASTATIN (PRAVACHOL) 40 MG TABLET    Take 40 mg by mouth once daily.    SUCRALFATE (CARAFATE) 1 GRAM TABLET    Take 1 tablet (1 g total) by mouth 4 (four) times daily as needed (Epigastric pain).    TORSEMIDE (DEMADEX) 20 MG TAB    Take 10 mg by mouth once daily.    VEDOLIZUMAB (ENTYVIO IV)    Inject 300 mg into the vein every 8 weeks.    VERAPAMIL (VERELAN) 240 MG C24P    Take  "240 mg by mouth once daily.    VITAMIN D (VITAMIN D3) 1000 UNITS TAB    Take 5,000 Units by mouth once daily.   Discontinued Medications    OMEGA-3 FATTY ACIDS/FISH OIL (FISH OIL-OMEGA-3 FATTY ACIDS) 300-1,000 MG CAPSULE    Take by mouth once daily.        Objective Findings:  Vital Signs:  BP (!) 140/70   Pulse 71   Ht 5' 2" (1.575 m)   Wt 63 kg (139 lb)   SpO2 96%   BMI 25.42 kg/m²  Body mass index is 25.42 kg/m².    Physical Exam:  Physical Exam  Vitals and nursing note reviewed.   Constitutional:       General: She is not in acute distress.     Appearance: Normal appearance.   HENT:      Mouth/Throat:      Mouth: Mucous membranes are moist.   Cardiovascular:      Rate and Rhythm: Normal rate.   Pulmonary:      Effort: Pulmonary effort is normal.      Breath sounds: No wheezing, rhonchi or rales.   Abdominal:      General: Bowel sounds are normal. There is no distension.      Palpations: Abdomen is soft. There is no mass.      Tenderness: There is no abdominal tenderness.      Hernia: No hernia is present.   Skin:     General: Skin is warm and dry.      Coloration: Skin is not jaundiced or pale.   Neurological:      Mental Status: She is alert and oriented to person, place, and time.   Psychiatric:         Mood and Affect: Mood normal.          Labs:  Lab Results   Component Value Date    WBC 6.49 09/18/2024    HGB 11.8 (L) 09/18/2024    HCT 35.5 (L) 09/18/2024    MCV 88.5 09/18/2024    RDW 13.3 09/18/2024     09/18/2024    LYMPH 16.0 (L) 09/18/2024    LYMPH 1.04 09/18/2024    MONO 6.9 (H) 09/18/2024    EOS 0.15 09/18/2024    BASO 0.03 09/18/2024     Lab Results   Component Value Date     05/29/2024    K 4.4 05/29/2024     05/29/2024    CO2 29 05/29/2024     (H) 05/29/2024    BUN 13 05/29/2024    CREATININE 0.92 05/29/2024    CALCIUM 10.5 (H) 05/29/2024    PROT 6.8 05/29/2024    ALBUMIN 3.6 05/29/2024    BILITOT 0.4 05/29/2024    ALKPHOS 117 05/29/2024    AST 39 (H) 05/29/2024    " ALT 49 05/29/2024         Imaging: No results found.      Assessment:  Jose Russell is a 79 y.o. female here with:  1. Ulcerative proctitis with complication    2. Ulcerative colitis with complication, unspecified location    3. Diarrhea, unspecified type          Recommendations:  1. Iron studies, CBC, CMP, magnesium, CRP  2. Schedule colonoscopy  3. Consider alternative to metformin due to diarrhea  4. Avoid NSAIDs  5. We will consider alternative biologic therapy following colonoscopy    Follow up in about 3 months (around 12/18/2024).      Order summary:  Orders Placed This Encounter    Iron and TIBC    Ferritin    CBC Auto Differential    Comprehensive Metabolic Panel    Magnesium    C-Reactive Protein    Sedimentation Rate    Colonoscopy       Thank you for allowing me to participate in the care of Jose Russell.      CHRIS Abdi

## 2024-09-19 ENCOUNTER — TELEPHONE (OUTPATIENT)
Dept: GASTROENTEROLOGY | Facility: CLINIC | Age: 79
End: 2024-09-19
Payer: MEDICARE

## 2024-09-19 NOTE — TELEPHONE ENCOUNTER
Results called to patient. Verbalized understanding. Labs faxed to [facility].            ----- Message from MELANIA Noel sent at 9/18/2024  4:40 PM CDT -----  Magnesium is 1.3 which is stable compared to 9 months ago.  She is taking Mag-Ox.  Was started on Mag-Ox by Cardiology.  Please send these labs to cardiology.  Ferritin is slightly increased at 396 with may be a result of inflammation.  Iron saturation and total iron normal.  Minimal anemia.  CRP is normal.  Sed rate is pending.

## 2024-09-25 ENCOUNTER — OFFICE VISIT (OUTPATIENT)
Dept: DERMATOLOGY | Facility: CLINIC | Age: 79
End: 2024-09-25
Payer: MEDICARE

## 2024-09-25 VITALS — RESPIRATION RATE: 18 BRPM | WEIGHT: 138.88 LBS | HEIGHT: 62 IN | BODY MASS INDEX: 25.55 KG/M2

## 2024-09-25 DIAGNOSIS — L40.9 SCALP PSORIASIS: Primary | ICD-10-CM

## 2024-09-25 DIAGNOSIS — L08.9 SKIN INFECTION: ICD-10-CM

## 2024-09-25 PROCEDURE — 87070 CULTURE OTHR SPECIMN AEROBIC: CPT | Mod: ,,, | Performed by: CLINICAL MEDICAL LABORATORY

## 2024-09-25 PROCEDURE — 99214 OFFICE O/P EST MOD 30 MIN: CPT | Mod: ,,, | Performed by: DERMATOLOGY

## 2024-09-25 RX ORDER — FLUOCINOLONE ACETONIDE 0.11 MG/ML
OIL TOPICAL
Qty: 118 ML | Refills: 5 | Status: SHIPPED | OUTPATIENT
Start: 2024-09-25

## 2024-09-25 NOTE — PROGRESS NOTES
[facility]   Charis Estrella MD    Patient Name: Jose Russell  Patient YOB: 1945   Date of Service: 9/25/24    CC: Follow-up Scalp Psoriasis    HPI: Jose Russell is a 79 y.o. female here today for follow-up of scalp psoriasis, last seen 59/2024.  Previous treatments include Derma-Rest, ketoconazole shampoo, and clobetasol solution.  Overall, the scalp psoriasis is worse.  Treatment plan was followed as directed.    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     Past Surgical History:   Procedure Laterality Date    CHOLECYSTECTOMY      COLONOSCOPY  06/12/2020    repeat in 3 years    ESOPHAGOGASTRODUODENOSCOPY  05/24/2018    HYSTERECTOMY      NEPHRECTOMY       Review of patient's allergies indicates:   Allergen Reactions    Levofloxacin in d5w     Mercaptopurine analogues (thiopurines)     Penicillins     Sulfa (sulfonamide antibiotics)        Current Outpatient Medications:     allopurinoL (ZYLOPRIM) 100 MG tablet, Take 200 mg by mouth once daily., Disp: , Rfl:     aspirin (ECOTRIN) 81 MG EC tablet, Take 81 mg by mouth once daily., Disp: , Rfl:     atenoloL (TENORMIN) 25 MG tablet, Take 25 mg by mouth once daily., Disp: , Rfl:     azithromycin (ZITHROMAX Z-AKHIL) 250 MG tablet, Take two tablets today and then one tablet daily for four days (Patient not taking: Reported on 9/18/2024), Disp: 6 tablet, Rfl: 0    Bifidobacterium infantis (ALIGN ORAL), Take by mouth once daily. (Patient not taking: Reported on 9/18/2024), Disp: , Rfl:     citalopram (CELEXA) 20 MG tablet, Take 20 mg by mouth once daily., Disp: , Rfl:     clobetasoL (CLOBEX)  0.05 % shampoo, Apply topically 2 (two) times daily. (Patient not taking: Reported on 9/18/2024), Disp: 118 mL, Rfl: 11    clobetasoL (TEMOVATE) 0.05 % external solution, Apply to AA on scalp BID PRN flares (Patient not taking: Reported on 9/18/2024), Disp: 50 mL, Rfl: 3    dapagliflozin propanediol (FARXIGA) 5 mg Tab tablet, Take 5 mg by mouth once daily. (Patient not taking: Reported on 9/18/2024), Disp: , Rfl:     esomeprazole (NEXIUM) 40 MG capsule, Take 40 mg by mouth once daily., Disp: , Rfl:     fluocinolone (DERMA-SMOOTHE) 0.01 % external oil, Apply to scalp nightly as directed, prn for flares, Disp: 118 mL, Rfl: 5    glyBURIDE (DIABETA) 5 MG tablet, Take 5 mg by mouth 2 (two) times daily with meals., Disp: , Rfl:     hydrALAZINE (APRESOLINE) 50 MG tablet, Take 50 mg by mouth once daily. (Patient not taking: Reported on 9/18/2024), Disp: , Rfl:     isosorbide mononitrate (IMDUR) 30 MG 24 hr tablet, Take 30 mg by mouth once daily., Disp: , Rfl:     ketoconazole (NIZORAL) 2 % shampoo, Use as a scalp treatment 2-3 times a week for maintenance, massaging into scalp and leaving on for up to 3 minutes before rinsing (Patient not taking: Reported on 9/18/2024), Disp: 120 mL, Rfl: 11    levothyroxine (SYNTHROID) 50 MCG tablet, Take 50 mcg by mouth before breakfast., Disp: , Rfl:     loratadine (CLARITIN) 10 mg tablet, Take 10 mg by mouth once daily. (Patient not taking: Reported on 9/18/2024), Disp: , Rfl:     magnesium oxide (MAG-OX) 400 mg (241.3 mg magnesium) tablet, Take 1 tablet by mouth., Disp: , Rfl:     magnesium oxide 400 mg magnesium Tab, Take 1 tablet by mouth 2 (two) times a day., Disp: 30 tablet, Rfl: 0    mesalamine (ASACOL HD) 800 mg TbEC, Take 3 tablets (2,400 mg total) by mouth 2 (two) times a day., Disp: 540 tablet, Rfl: 3    metFORMIN (GLUCOPHAGE) 1000 MG tablet, Take 1,000 mg by mouth 2 (two) times daily with meals., Disp: , Rfl:     metFORMIN (GLUCOPHAGE) 500 MG tablet, Take 500 mg by mouth 2  (two) times daily with meals. (Patient not taking: Reported on 9/18/2024), Disp: , Rfl:     nitroGLYCERIN (NITROSTAT) 0.4 MG SL tablet, Place 0.4 mg under the tongue every 5 (five) minutes as needed for Chest pain., Disp: , Rfl:     ondansetron (ZOFRAN) 4 MG tablet, Take 1 tablet (4 mg total) by mouth every 6 (six) hours., Disp: 12 tablet, Rfl: 0    ondansetron (ZOFRAN-ODT) 4 MG TbDL, Take 1 tablet (4 mg total) by mouth every 6 (six) hours as needed. (Patient not taking: Reported on 9/18/2024), Disp: 10 tablet, Rfl: 0    polyethylene glycol (GLYCOLAX) 17 gram PwPk, Take by mouth. (Patient not taking: Reported on 9/18/2024), Disp: , Rfl:     pravastatin (PRAVACHOL) 40 MG tablet, Take 40 mg by mouth once daily., Disp: , Rfl:     sucralfate (CARAFATE) 1 gram tablet, Take 1 tablet (1 g total) by mouth 4 (four) times daily as needed (Epigastric pain)., Disp: 100 tablet, Rfl: 1    torsemide (DEMADEX) 20 MG Tab, Take 10 mg by mouth once daily., Disp: , Rfl:     vedolizumab (ENTYVIO IV), Inject 300 mg into the vein every 8 weeks., Disp: , Rfl:     verapamiL (VERELAN) 240 MG C24P, Take 240 mg by mouth once daily., Disp: , Rfl:     vitamin D (VITAMIN D3) 1000 units Tab, Take 5,000 Units by mouth once daily., Disp: , Rfl:     ROS: A focused review of systems was obtained and negative.     Exam: A focused skin exam was performed. All areas examined were normal except as mentioned in the assessment and plan below.  General Appearance of the patient is well developed and well nourished.  Orientation: alert and oriented x 3.  Mood and affect: pleasant.    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 A diagnosis of Skin infection was also pertinent to this visit.    Plan:   Medications Ordered This Encounter   Medications    fluocinolone (DERMA-SMOOTHE) 0.01 % external oil     Sig: Apply to scalp nightly as directed, prn for flares     Dispense:  118 mL     Refill:  5     Scalp Psoriasis  - Psoriasiform  plaques with micaceous scale  Status: Inadequately controlled     Plan: Counseling.  I counseled the patient regarding the following:  Skin care: Emollients, ambient sun exposure, shampoos with tar, selenium or zinc pyrithione can improve  psoriasis. Topical steroids and vitamin D analogs can help more severe cases.  Expectations: Scalp psoriasis is a common type of localized psoriasis. Scalp psoriasis often stays localized to  the scalp. Psoriasis is chronic in nature with periods of remissions and flares. Flares can be triggered by stress,  infections (group A strep), certain medications and alcohol.  Contact office if: Psoriasis worsens, or fails to improve despite several months of treatment.   - Continue to use OTC Derma-Rest shampoo for maintenance   - Will send in Derma- Smoothe oil PRN flares    Skin Infection, NOS   - some crusting of psoriasis     Plan: Counseling  I counseled the patient regarding the following:  Skin care: Patients with purulence or fluid collections should have their wounds re-opened, drained, cultured and irrigated. All wound infections should be treated with antibiotics.  Expectations: Wound Infections usually occur 4-7 days postoperatively. Patients exhibit, pain, rednes, swelling, cellulitic changes and fever.  Contact Office if: Wound Infection fails to respond to treatment or worsens, patient develops a fever, or if redness spreads despite antibiotics.    A bacterial culture was obtained from the scalp      Follow up in about 2 months (around 11/25/2024) for Scalp Psoriasisi .    Charis Estrella MD

## 2024-09-27 LAB — MICROORGANISM SPEC CULT: NORMAL

## 2024-10-07 ENCOUNTER — INFUSION (OUTPATIENT)
Dept: INFUSION THERAPY | Facility: HOSPITAL | Age: 79
End: 2024-10-07
Attending: INTERNAL MEDICINE
Payer: MEDICARE

## 2024-10-07 VITALS
DIASTOLIC BLOOD PRESSURE: 69 MMHG | BODY MASS INDEX: 25.24 KG/M2 | HEART RATE: 65 BPM | OXYGEN SATURATION: 98 % | RESPIRATION RATE: 16 BRPM | WEIGHT: 138 LBS | SYSTOLIC BLOOD PRESSURE: 149 MMHG

## 2024-10-07 DIAGNOSIS — K51.919 ULCERATIVE COLITIS WITH COMPLICATION, UNSPECIFIED LOCATION: Primary | ICD-10-CM

## 2024-10-07 PROCEDURE — 63600175 PHARM REV CODE 636 W HCPCS: Mod: JZ,JG | Performed by: NURSE PRACTITIONER

## 2024-10-07 PROCEDURE — 25000003 PHARM REV CODE 250: Performed by: NURSE PRACTITIONER

## 2024-10-07 PROCEDURE — 96375 TX/PRO/DX INJ NEW DRUG ADDON: CPT

## 2024-10-07 PROCEDURE — 63600175 PHARM REV CODE 636 W HCPCS

## 2024-10-07 PROCEDURE — 96365 THER/PROPH/DIAG IV INF INIT: CPT

## 2024-10-07 RX ORDER — SODIUM CHLORIDE 0.9 % (FLUSH) 0.9 %
10 SYRINGE (ML) INJECTION
Status: DISCONTINUED | OUTPATIENT
Start: 2024-10-07 | End: 2024-10-07 | Stop reason: HOSPADM

## 2024-10-07 RX ORDER — ACETAMINOPHEN 325 MG/1
650 TABLET ORAL ONCE
Status: COMPLETED | OUTPATIENT
Start: 2024-10-07 | End: 2024-10-07

## 2024-10-07 RX ORDER — METHYLPREDNISOLONE SOD SUCC 125 MG
125 VIAL (EA) INJECTION
Status: COMPLETED | OUTPATIENT
Start: 2024-10-07 | End: 2024-10-07

## 2024-10-07 RX ORDER — DIPHENHYDRAMINE HYDROCHLORIDE 50 MG/ML
INJECTION INTRAMUSCULAR; INTRAVENOUS
Status: COMPLETED
Start: 2024-10-07 | End: 2024-10-07

## 2024-10-07 RX ORDER — DIPHENHYDRAMINE HYDROCHLORIDE 50 MG/ML
25 INJECTION INTRAMUSCULAR; INTRAVENOUS ONCE
Status: COMPLETED | OUTPATIENT
Start: 2024-10-07 | End: 2024-10-07

## 2024-10-07 RX ADMIN — DIPHENHYDRAMINE HYDROCHLORIDE 25 MG: 50 INJECTION INTRAMUSCULAR; INTRAVENOUS at 12:10

## 2024-10-07 RX ADMIN — METHYLPREDNISOLONE SODIUM SUCCINATE 62.5 MG: 125 INJECTION, POWDER, FOR SOLUTION INTRAMUSCULAR; INTRAVENOUS at 12:10

## 2024-10-07 RX ADMIN — ACETAMINOPHEN 650 MG: 325 TABLET ORAL at 12:10

## 2024-10-07 RX ADMIN — VEDOLIZUMAB 300 MG: 300 INJECTION, POWDER, LYOPHILIZED, FOR SOLUTION INTRAVENOUS at 12:10

## 2024-10-07 NOTE — PROGRESS NOTES
Patient arrived for Entyvio infusion today ambulatory to [facility] . Therapy plan released and pharmacy notified. Patients int started to left AC  Patient given premedication :  650mg Tylenol PO  62.5mg of solumedrol due to Blood sugars running high  25mg Benadryl IV  1215 infusion started  1245 infusion finished  1245 Int flushed   1315 Patient discharged , given AVS and told to watch for signs and symptoms of adverse reactions , if had any to go to the ER. Patient given upcoming appointment

## 2024-11-27 ENCOUNTER — HOSPITAL ENCOUNTER (OUTPATIENT)
Dept: GASTROENTEROLOGY | Facility: HOSPITAL | Age: 79
Discharge: HOME | End: 2024-11-27
Attending: NURSE PRACTITIONER | Admitting: INTERNAL MEDICINE
Payer: MEDICARE

## 2024-11-27 ENCOUNTER — ANESTHESIA EVENT (OUTPATIENT)
Dept: GASTROENTEROLOGY | Facility: HOSPITAL | Age: 79
End: 2024-11-27
Payer: MEDICARE

## 2024-11-27 ENCOUNTER — ANESTHESIA (OUTPATIENT)
Dept: GASTROENTEROLOGY | Facility: HOSPITAL | Age: 79
End: 2024-11-27
Payer: MEDICARE

## 2024-11-27 VITALS
HEART RATE: 70 BPM | BODY MASS INDEX: 25.21 KG/M2 | OXYGEN SATURATION: 94 % | RESPIRATION RATE: 9 BRPM | SYSTOLIC BLOOD PRESSURE: 147 MMHG | DIASTOLIC BLOOD PRESSURE: 66 MMHG | TEMPERATURE: 98 F | WEIGHT: 137 LBS | HEIGHT: 62 IN

## 2024-11-27 DIAGNOSIS — K51.919 ULCERATIVE COLITIS WITH COMPLICATION, UNSPECIFIED LOCATION: ICD-10-CM

## 2024-11-27 DIAGNOSIS — D12.2 ADENOMATOUS POLYP OF ASCENDING COLON: ICD-10-CM

## 2024-11-27 DIAGNOSIS — K52.9 CHRONIC DIARRHEA: Primary | ICD-10-CM

## 2024-11-27 DIAGNOSIS — K57.30 COLON, DIVERTICULOSIS: ICD-10-CM

## 2024-11-27 DIAGNOSIS — K51.219 ULCERATIVE PROCTITIS WITH COMPLICATION: ICD-10-CM

## 2024-11-27 LAB — GLUCOSE SERPL-MCNC: 169 MG/DL (ref 70–105)

## 2024-11-27 PROCEDURE — 63600175 PHARM REV CODE 636 W HCPCS: Performed by: NURSE ANESTHETIST, CERTIFIED REGISTERED

## 2024-11-27 PROCEDURE — 45388 COLONOSCOPY W/ABLATION: CPT | Performed by: INTERNAL MEDICINE

## 2024-11-27 PROCEDURE — 82962 GLUCOSE BLOOD TEST: CPT

## 2024-11-27 PROCEDURE — 27000284 HC CANNULA NASAL: Performed by: NURSE ANESTHETIST, CERTIFIED REGISTERED

## 2024-11-27 PROCEDURE — 88305 TISSUE EXAM BY PATHOLOGIST: CPT | Mod: TC,91,SUR | Performed by: INTERNAL MEDICINE

## 2024-11-27 PROCEDURE — 37000009 HC ANESTHESIA EA ADD 15 MINS

## 2024-11-27 PROCEDURE — 37000008 HC ANESTHESIA 1ST 15 MINUTES

## 2024-11-27 PROCEDURE — 45385 COLONOSCOPY W/LESION REMOVAL: CPT | Mod: 59 | Performed by: INTERNAL MEDICINE

## 2024-11-27 PROCEDURE — 27201423 OPTIME MED/SURG SUP & DEVICES STERILE SUPPLY

## 2024-11-27 PROCEDURE — 45380 COLONOSCOPY AND BIOPSY: CPT | Mod: 59 | Performed by: INTERNAL MEDICINE

## 2024-11-27 RX ORDER — LIDOCAINE HYDROCHLORIDE 20 MG/ML
INJECTION, SOLUTION EPIDURAL; INFILTRATION; INTRACAUDAL; PERINEURAL
Status: DISCONTINUED | OUTPATIENT
Start: 2024-11-27 | End: 2024-11-27

## 2024-11-27 RX ORDER — SODIUM CHLORIDE, SODIUM LACTATE, POTASSIUM CHLORIDE, CALCIUM CHLORIDE 600; 310; 30; 20 MG/100ML; MG/100ML; MG/100ML; MG/100ML
INJECTION, SOLUTION INTRAVENOUS CONTINUOUS
Status: DISCONTINUED | OUTPATIENT
Start: 2024-11-27 | End: 2024-11-28 | Stop reason: HOSPADM

## 2024-11-27 RX ORDER — SODIUM CHLORIDE 0.9 % (FLUSH) 0.9 %
10 SYRINGE (ML) INJECTION EVERY 6 HOURS PRN
Status: DISCONTINUED | OUTPATIENT
Start: 2024-11-27 | End: 2024-11-28 | Stop reason: HOSPADM

## 2024-11-27 RX ORDER — VALSARTAN 80 MG/1
80 TABLET ORAL DAILY
COMMUNITY

## 2024-11-27 RX ORDER — PROPOFOL 10 MG/ML
VIAL (ML) INTRAVENOUS
Status: DISCONTINUED | OUTPATIENT
Start: 2024-11-27 | End: 2024-11-27

## 2024-11-27 RX ADMIN — PROPOFOL 30 MG: 10 INJECTION, EMULSION INTRAVENOUS at 12:11

## 2024-11-27 RX ADMIN — PROPOFOL 50 MG: 10 INJECTION, EMULSION INTRAVENOUS at 12:11

## 2024-11-27 RX ADMIN — PROPOFOL 70 MG: 10 INJECTION, EMULSION INTRAVENOUS at 12:11

## 2024-11-27 RX ADMIN — LIDOCAINE HYDROCHLORIDE 60 MG: 20 INJECTION, SOLUTION INTRAVENOUS at 12:11

## 2024-11-27 RX ADMIN — PROPOFOL 40 MG: 10 INJECTION, EMULSION INTRAVENOUS at 12:11

## 2024-11-27 NOTE — DISCHARGE INSTRUCTIONS
Procedure Date  11/27/24     Impression  Overall Impression:   One polyp measuring 10-19 mm in the ascending colon; performed hot snare removal; the lesion was ablated  Performed forceps biopsies in the ascending colon, transverse colon, descending colon, sigmoid colon and rectum  Diverticulosis in the ascending colon, descending colon and sigmoid colon  Digital rectal exam revealed no mass.  The colon was exam from the rectum to the cecum.  Pandiverticulosis was present.  A 10-12 mm polyp was removed with hot snare polypectomy from the proximal ascending colon and residual polyp tissue was ablated with APC.  The colon mucosa showed no evidence of active inflammation and biopsies were obtained from each portion of the colon.  Impression:  Chronic diarrhea, ulcerative colitis which appears to be suppressed, colon diverticulosis, 1 polyp was removed during this exam.  Recommendation  Await pathology results, due: 12/2/2024   No further screening colonoscopies necessary      Disposition:  Discharge patient to home in stable condition.  Resume diet.  No driving until tomorrow.  Follow up pathology next week.  Follow up with PCP as scheduled,     return to GI clinic for follow up. - December 19, 2024 @ 2:40 pm     No driving today, no operating heavy machinery, no signing any legal documents until tomorrow.    Drink lots of fluids, resume regular diet.  Take your normal medications.     Please call our office for any nausea, vomiting or abdominal pain.

## 2024-11-27 NOTE — H&P
[facility]  Gastroenterology  H&P    Patient Name: Jose Russell  MRN: [id number]  Admission Date: 11/27/2024  Code Status: Prior    Attending Provider: Johana Corbin FNP   Primary Care Physician: Alyssa Michele FNP  Principal Problem:<principal problem not specified>    Subjective:     History of Present Illness:  This patient is an 79-year-old female with ulcerative colitis.  She is on Entyvio and she complains of diarrhea.  She is on metformin which may also cause diarrhea.  Colonoscopy was scheduled for today.    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       Past Surgical History:   Procedure Laterality Date    CHOLECYSTECTOMY      COLONOSCOPY  06/12/2020    repeat in 3 years    ESOPHAGOGASTRODUODENOSCOPY  05/24/2018    HYSTERECTOMY      NEPHRECTOMY         Review of patient's allergies indicates:   Allergen Reactions    Levofloxacin in d5w     Mercaptopurine analogues (thiopurines)     Penicillins     Sulfa (sulfonamide antibiotics)      Family History    None       Tobacco Use    Smoking status: Never    Smokeless tobacco: Never   Substance and Sexual Activity    Alcohol use: Never    Drug use: Never    Sexual activity: Not on file     Review of Systems   Respiratory: Negative.     Cardiovascular: Negative.    Gastrointestinal:  Positive for diarrhea.     Objective:     Vital Signs (Most Recent):  Temp: 97.9 °F (36.6 °C) (11/27/24 1138)  Pulse: 70 (11/27/24 1138)  Resp: 16 (11/27/24 1138)  BP: (!) 188/75 (11/27/24 1138)  SpO2: 98 % (11/27/24 1138) Vital Signs (24h Range):  Temp:  [97.9 °F (36.6 °C)] 97.9 °F (36.6 °C)  Pulse:  [70]  "70  Resp:  [16] 16  SpO2:  [98 %] 98 %  BP: (188)/(75) 188/75     Weight: 62.1 kg (137 lb) (11/27/24 1138)  Body mass index is 25.06 kg/m².    No intake or output data in the 24 hours ending 11/27/24 1206    Lines/Drains/Airways       Peripheral Intravenous Line  Duration                  Peripheral IV - Single Lumen 11/27/24 1138 22 G Left Antecubital <1 day                    Physical Exam  Vitals reviewed.   Constitutional:       General: She is not in acute distress.     Appearance: Normal appearance. She is well-developed. She is not ill-appearing.   HENT:      Head: Normocephalic and atraumatic.      Nose: Nose normal.   Eyes:      Pupils: Pupils are equal, round, and reactive to light.   Cardiovascular:      Rate and Rhythm: Normal rate and regular rhythm.   Pulmonary:      Effort: Pulmonary effort is normal.      Breath sounds: Normal breath sounds. No wheezing.   Abdominal:      General: Abdomen is flat. Bowel sounds are normal. There is no distension.      Palpations: Abdomen is soft.      Tenderness: There is no abdominal tenderness. There is no guarding.   Skin:     General: Skin is warm and dry.      Coloration: Skin is not jaundiced.   Neurological:      Mental Status: She is alert.   Psychiatric:         Attention and Perception: Attention normal.         Mood and Affect: Affect normal.         Speech: Speech normal.         Behavior: Behavior is cooperative.      Comments: Pt was calm while speaking.         Significant Labs:  CBC: No results for input(s): "WBC", "HGB", "HCT", "PLT" in the last 48 hours.  CMP: No results for input(s): "GLU", "CALCIUM", "ALBUMIN", "PROT", "NA", "K", "CO2", "CL", "BUN", "CREATININE", "ALKPHOS", "ALT", "AST", "BILITOT" in the last 48 hours.    Significant Imaging:  Imaging results within the past 24 hours have been reviewed.    Assessment/Plan:     There are no hospital problems to display for this patient.        Impression:  Ulcerative colitis, chronic " diarrhea.  Plan:  Colonoscopy today    Adi Salcido MD  Gastroenterology  [facility]

## 2024-11-27 NOTE — ANESTHESIA PREPROCEDURE EVALUATION
11/27/2024  Jose Russell is a 79 y.o., female.    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    Social History     Socioeconomic History    Marital status:    Tobacco Use    Smoking status: Never    Smokeless tobacco: Never   Substance and Sexual Activity    Alcohol use: Never    Drug use: Never       Current Outpatient Medications   Medication Sig Dispense Refill    allopurinoL (ZYLOPRIM) 100 MG tablet Take 200 mg by mouth once daily.      aspirin (ECOTRIN) 81 MG EC tablet Take 81 mg by mouth once daily.      atenoloL (TENORMIN) 25 MG tablet Take 25 mg by mouth once daily.      azithromycin (ZITHROMAX Z-AKHIL) 250 MG tablet Take two tablets today and then one tablet daily for four days (Patient not taking: Reported on 9/18/2024) 6 tablet 0    Bifidobacterium infantis (ALIGN ORAL) Take by mouth once daily. (Patient not taking: Reported on 9/18/2024)      citalopram (CELEXA) 20 MG tablet Take 20 mg by mouth once daily.      clobetasoL (CLOBEX) 0.05 % shampoo Apply topically 2 (two) times daily. (Patient not taking: Reported on 9/18/2024) 118 mL 11    clobetasoL (TEMOVATE) 0.05 % external solution Apply to AA on scalp BID PRN flares (Patient not taking: Reported on 9/18/2024) 50 mL 3    dapagliflozin propanediol  (FARXIGA) 5 mg Tab tablet Take 5 mg by mouth once daily. (Patient not taking: Reported on 9/18/2024)      esomeprazole (NEXIUM) 40 MG capsule Take 40 mg by mouth once daily.      fluocinolone (DERMA-SMOOTHE) 0.01 % external oil Apply to scalp nightly as directed, prn for flares 118 mL 5    glyBURIDE (DIABETA) 5 MG tablet Take 5 mg by mouth 2 (two) times daily with meals.      hydrALAZINE (APRESOLINE) 50 MG tablet Take 50 mg by mouth once daily. (Patient not taking: Reported on 9/18/2024)      isosorbide mononitrate (IMDUR) 30 MG 24 hr tablet Take 30 mg by mouth once daily.      ketoconazole (NIZORAL) 2 % shampoo Use as a scalp treatment 2-3 times a week for maintenance, massaging into scalp and leaving on for up to 3 minutes before rinsing (Patient not taking: Reported on 9/18/2024) 120 mL 11    levothyroxine (SYNTHROID) 50 MCG tablet Take 50 mcg by mouth before breakfast.      loratadine (CLARITIN) 10 mg tablet Take 10 mg by mouth once daily. (Patient not taking: Reported on 9/18/2024)      magnesium oxide (MAG-OX) 400 mg (241.3 mg magnesium) tablet Take 1 tablet by mouth.      magnesium oxide 400 mg magnesium Tab Take 1 tablet by mouth 2 (two) times a day. 30 tablet 0    mesalamine (ASACOL HD) 800 mg TbEC Take 3 tablets (2,400 mg total) by mouth 2 (two) times a day. 540 tablet 3    metFORMIN (GLUCOPHAGE) 1000 MG tablet Take 1,000 mg by mouth 2 (two) times daily with meals.      metFORMIN (GLUCOPHAGE) 500 MG tablet Take 500 mg by mouth 2 (two) times daily with meals. (Patient not taking: Reported on 9/18/2024)      nitroGLYCERIN (NITROSTAT) 0.4 MG SL tablet Place 0.4 mg under the tongue every 5 (five) minutes as needed for Chest pain.      ondansetron (ZOFRAN) 4 MG tablet Take 1 tablet (4 mg total) by mouth every 6 (six) hours. 12 tablet 0    ondansetron (ZOFRAN-ODT) 4 MG TbDL Take 1 tablet (4 mg total) by mouth every 6 (six) hours as needed. (Patient not taking: Reported on 9/18/2024) 10 tablet 0    polyethylene  glycol (GLYCOLAX) 17 gram PwPk Take by mouth. (Patient not taking: Reported on 9/18/2024)      pravastatin (PRAVACHOL) 40 MG tablet Take 40 mg by mouth once daily.      sucralfate (CARAFATE) 1 gram tablet Take 1 tablet (1 g total) by mouth 4 (four) times daily as needed (Epigastric pain). 100 tablet 1    torsemide (DEMADEX) 20 MG Tab Take 10 mg by mouth once daily.      vedolizumab (ENTYVIO IV) Inject 300 mg into the vein every 8 weeks.      verapamiL (VERELAN) 240 MG C24P Take 240 mg by mouth once daily.      vitamin D (VITAMIN D3) 1000 units Tab Take 5,000 Units by mouth once daily.       No current facility-administered medications for this encounter.       Review of patient's allergies indicates:   Allergen Reactions    Levofloxacin in d5w     Mercaptopurine analogues (thiopurines)     Penicillins     Sulfa (sulfonamide antibiotics)         Pre-op Assessment    I have reviewed the Patient Summary Reports.     I have reviewed the Nursing Notes. I have reviewed the NPO Status.   I have reviewed the Medications.     Review of Systems  Anesthesia Hx:  No problems with previous Anesthesia                Cardiovascular:     Hypertension                                          Pulmonary:    Asthma                    Renal/:  Chronic Renal Disease                Hepatic/GI:   PUD, Hiatal Hernia, GERD                Neurological:    Neuromuscular Disease,                                   Endocrine:  Diabetes               Physical Exam  General: Well nourished, Cooperative, Alert and Oriented    Airway:  Mallampati: II   Mouth Opening: Normal  TM Distance: Normal  Tongue: Normal  Neck ROM: Normal ROM    Dental:  Dentures        Anesthesia Plan  Type of Anesthesia, risks & benefits discussed:    Anesthesia Type: Gen Natural Airway, MAC  Intra-op Monitoring Plan: Standard ASA Monitors  Post Op Pain Control Plan: multimodal analgesia  Induction:  IV  Informed Consent: Informed consent signed with the Patient and all  parties understand the risks and agree with anesthesia plan.  All questions answered. Patient consented to blood products? Yes  ASA Score: 3  Day of Surgery Review of History & Physical: H&P Update referred to the surgeon/provider.I have interviewed and examined the patient. I have reviewed the patient's H&P dated:     Ready For Surgery From Anesthesia Perspective.     .

## 2024-11-27 NOTE — ANESTHESIA POSTPROCEDURE EVALUATION
Anesthesia Post Evaluation    Patient: Jose Russell    Procedure(s) Performed: Colonoscopy    Final Anesthesia Type: general      Patient location during evaluation: GI PACU  Patient participation: Yes- Able to Participate  Level of consciousness: awake and alert  Post-procedure vital signs: reviewed and stable  Pain management: adequate  Airway patency: patent  SHANA mitigation strategies: Multimodal analgesia  PONV status at discharge: No PONV  Anesthetic complications: no      Cardiovascular status: stable  Respiratory status: unassisted  Hydration status: euvolemic  Follow-up not needed.              Vitals Value Taken Time   /66 11/27/24 1259   Temp  11/27/24 1532   Pulse 71 11/27/24 1259   Resp 14 11/27/24 1259   SpO2 97 % 11/27/24 1259   Vitals shown include unfiled device data.      Event Time   Out of Recovery 13:31:34         Pain/Sri Score: Sri Score: 9 (11/27/2024 12:43 PM)

## 2024-11-27 NOTE — TRANSFER OF CARE
"Anesthesia Transfer of Care Note    Patient: Jose Russell    Procedure(s) Performed: Colonoscopy    Patient location: GI    Anesthesia Type: general    Transport from OR: Transported from OR on room air with adequate spontaneous ventilation. Continuous ECG monitoring in transport. Continuous SpO2 monitoring in transport    Post pain: adequate analgesia    Post assessment: no apparent anesthetic complications and tolerated procedure well    Post vital signs: stable    Level of consciousness: responds to stimulation and sedated    Nausea/Vomiting: no nausea/vomiting    Complications: none    Transfer of care protocol was followed      Last vitals: Visit Vitals  BP (!) 188/75   Pulse 70   Temp 36.6 °C (97.9 °F) (Oral)   Resp 16   Ht 5' 2" (1.575 m)   Wt 62.1 kg (137 lb)   SpO2 98%   Breastfeeding No   BMI 25.06 kg/m²     "

## 2024-11-29 LAB
ESTROGEN SERPL-MCNC: NORMAL PG/ML
INSULIN SERPL-ACNC: NORMAL U[IU]/ML
LAB AP GROSS DESCRIPTION: NORMAL
LAB AP LABORATORY NOTES: NORMAL
T3RU NFR SERPL: NORMAL %

## 2024-12-02 ENCOUNTER — TELEPHONE (OUTPATIENT)
Dept: GASTROENTEROLOGY | Facility: CLINIC | Age: 79
End: 2024-12-02
Payer: MEDICARE

## 2024-12-02 ENCOUNTER — INFUSION (OUTPATIENT)
Dept: INFUSION THERAPY | Facility: HOSPITAL | Age: 79
End: 2024-12-02
Attending: OBSTETRICS & GYNECOLOGY
Payer: MEDICARE

## 2024-12-02 VITALS — OXYGEN SATURATION: 97 % | DIASTOLIC BLOOD PRESSURE: 73 MMHG | SYSTOLIC BLOOD PRESSURE: 150 MMHG | HEART RATE: 68 BPM

## 2024-12-02 DIAGNOSIS — K51.919 ULCERATIVE COLITIS WITH COMPLICATION, UNSPECIFIED LOCATION: Primary | ICD-10-CM

## 2024-12-02 PROCEDURE — 96375 TX/PRO/DX INJ NEW DRUG ADDON: CPT

## 2024-12-02 PROCEDURE — 63600175 PHARM REV CODE 636 W HCPCS: Performed by: OBSTETRICS & GYNECOLOGY

## 2024-12-02 PROCEDURE — 96365 THER/PROPH/DIAG IV INF INIT: CPT

## 2024-12-02 PROCEDURE — 96374 THER/PROPH/DIAG INJ IV PUSH: CPT

## 2024-12-02 PROCEDURE — 25000003 PHARM REV CODE 250: Performed by: OBSTETRICS & GYNECOLOGY

## 2024-12-02 RX ORDER — ACETAMINOPHEN 325 MG/1
650 TABLET ORAL ONCE
Status: COMPLETED | OUTPATIENT
Start: 2024-12-02 | End: 2024-12-02

## 2024-12-02 RX ORDER — DIPHENHYDRAMINE HYDROCHLORIDE 50 MG/ML
25 INJECTION INTRAMUSCULAR; INTRAVENOUS EVERY 6 HOURS PRN
Status: DISPENSED | OUTPATIENT
Start: 2024-12-02

## 2024-12-02 RX ORDER — SODIUM CHLORIDE 0.9 % (FLUSH) 0.9 %
10 SYRINGE (ML) INJECTION
Status: DISCONTINUED | OUTPATIENT
Start: 2024-12-02 | End: 2024-12-02 | Stop reason: HOSPADM

## 2024-12-02 RX ORDER — METHYLPREDNISOLONE SOD SUCC 125 MG
125 VIAL (EA) INJECTION
Status: COMPLETED | OUTPATIENT
Start: 2024-12-02 | End: 2024-12-02

## 2024-12-02 RX ADMIN — ACETAMINOPHEN 650 MG: 325 TABLET ORAL at 11:12

## 2024-12-02 RX ADMIN — VEDOLIZUMAB 300 MG: 300 INJECTION, POWDER, LYOPHILIZED, FOR SOLUTION INTRAVENOUS at 11:12

## 2024-12-02 RX ADMIN — METHYLPREDNISOLONE SODIUM SUCCINATE 62.5 MG: 125 INJECTION, POWDER, FOR SOLUTION INTRAMUSCULAR; INTRAVENOUS at 11:12

## 2024-12-02 RX ADMIN — DIPHENHYDRAMINE HYDROCHLORIDE 25 MG: 50 INJECTION INTRAMUSCULAR; INTRAVENOUS at 11:12

## 2024-12-02 NOTE — TELEPHONE ENCOUNTER
Results and recommendations called to patient. Verbalized understanding.          ----- Message from Adi Salcido MD sent at 12/2/2024  8:26 AM CST -----  Colon biopsy showed mild architectural distortion without dysplasia and there was no mention of active inflammation.  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  Recommendation:  Avoid nonsteroidal anti-inflammatories, continue current medications, follow up in GI clinic.  Consider repeat colonoscopy in 3 years depending on health status at that time.

## 2024-12-02 NOTE — PROGRESS NOTES
Patient arrived for Entyvio infusion today ambulatory to bay. Therapy plan released and pharmacy notified.   Patients int started to left AC  1123 infusion started  1203 infusion finished  1203 Int flushed   1215 Patient discharged , given AVS and told to watch for signs and symptoms of adverse reactions , if had any to go to the ER. Patient given upcoming appointment

## 2024-12-19 ENCOUNTER — OFFICE VISIT (OUTPATIENT)
Dept: GASTROENTEROLOGY | Facility: CLINIC | Age: 79
End: 2024-12-19
Payer: MEDICARE

## 2024-12-19 VITALS
SYSTOLIC BLOOD PRESSURE: 145 MMHG | WEIGHT: 140.81 LBS | HEART RATE: 69 BPM | HEIGHT: 62 IN | DIASTOLIC BLOOD PRESSURE: 71 MMHG | OXYGEN SATURATION: 98 % | BODY MASS INDEX: 25.91 KG/M2

## 2024-12-19 DIAGNOSIS — D50.0 IRON DEFICIENCY ANEMIA DUE TO CHRONIC BLOOD LOSS: ICD-10-CM

## 2024-12-19 DIAGNOSIS — K51.919 ULCERATIVE COLITIS WITH COMPLICATION, UNSPECIFIED LOCATION: Primary | ICD-10-CM

## 2024-12-19 PROCEDURE — 99999 PR PBB SHADOW E&M-EST. PATIENT-LVL V: CPT | Mod: PBBFAC,,, | Performed by: NURSE PRACTITIONER

## 2024-12-19 PROCEDURE — 99214 OFFICE O/P EST MOD 30 MIN: CPT | Mod: S$PBB,,, | Performed by: NURSE PRACTITIONER

## 2024-12-19 PROCEDURE — 99215 OFFICE O/P EST HI 40 MIN: CPT | Mod: PBBFAC | Performed by: NURSE PRACTITIONER

## 2024-12-19 NOTE — PROGRESS NOTES
Jose Russell is a 79 y.o. female here for Follow-up (No problems)        PCP: Alyssa Michele  Referring Provider: No referring provider defined for this encounter.     HPI:  Presents for follow-up due to ulcerative colitis.  Patient had colonoscopy on 11/27/2024, tubulovillous adenoma was removed from the ascending colon.  No active inflammation noted in the ascending, transverse, descending, or sigmoid colon. Moderate architectural distortion noted in the rectum.  No granulomas or dysplasia.  The patient denies hematochezia or melena.  Was previously having diarrhea.  Reports that she has not had diarrhea in the last 3 weeks.  States that they have altered her blood pressure medication and that she feels that this is slightly constipating her which is counteracting the diarrhea.  The patient has been taking 1000 mg of metformin twice daily.  And also Mag-Ox.  Following biopsy evaluation of colon it is felt that diarrhea may be medication induced.  No unintentional weight loss.  She is being followed by Dr. Fragoso at [facility].  States that she is due to see him again in January.  Labs from 09/18/2024 reviewed, iron normal, iron saturation is also normal.  TIBC is decreased.  Mild anemia, HGB 11.8 and HCT 35.5.  Magnesium 1.3.  CRP normal.  The patient is prescribed mag ox by Cardiology.  TSH is normal.  The patient states that overall she is feeling much better.  She has question reducing dose of Asacol.  I did discuss this with Dr. Salcido who prefers with the patient stay on the current dose.Patient is currently receiving Entyvio infusions for ulcerative colitis.  The last infusion was 12-24 without any complications.  No signs of infection.    Follow-up  Pertinent negatives include no abdominal pain, change in bowel habit, chest pain, fatigue, fever, nausea or vomiting.         ROS:  Review of Systems   Constitutional:  Negative for appetite change, fatigue, fever and unexpected weight  change.   HENT:  Negative for trouble swallowing.    Respiratory:  Negative for shortness of breath.    Cardiovascular:  Negative for chest pain.   Gastrointestinal:  Positive for diarrhea. Negative for abdominal pain, blood in stool, change in bowel habit, constipation, nausea, vomiting, reflux and fecal incontinence.   Musculoskeletal:  Negative for gait problem.   Integumentary:  Negative for pallor.   Psychiatric/Behavioral:  The patient is not nervous/anxious.           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    PSHX:  has a past surgical history that includes Colonoscopy (06/12/2020); Esophagogastroduodenoscopy (05/24/2018); Hysterectomy; Cholecystectomy; and Nephrectomy.    PFHX: family history is not on file.    PSlHX:  reports that she has never smoked. She has never used smokeless tobacco. She reports that she does not drink alcohol and does not use drugs.        Review of patient's allergies indicates:   Allergen Reactions    Levofloxacin in d5w     Mercaptopurine analogues (thiopurines)     Penicillins     Sulfa (sulfonamide antibiotics)        Medication List with Changes/Refills   Current Medications    ALLOPURINOL (ZYLOPRIM) 100 MG TABLET    Take 200 mg by mouth once daily.    ASPIRIN (ECOTRIN) 81 MG EC TABLET    Take 81 mg by mouth once daily.    ATENOLOL (TENORMIN) 25 MG TABLET    Take 25 mg by mouth once daily.    AZITHROMYCIN (ZITHROMAX Z-AKHIL) 250 MG TABLET    Take two tablets today and then one tablet daily for four days    BIFIDOBACTERIUM INFANTIS (ALIGN ORAL)    Take by mouth once daily.    CITALOPRAM (CELEXA) 20 MG TABLET    Take 20 mg by mouth once daily.     CLOBETASOL (CLOBEX) 0.05 % SHAMPOO    Apply topically 2 (two) times daily.    CLOBETASOL (TEMOVATE) 0.05 % EXTERNAL SOLUTION    Apply to AA on scalp BID PRN flares    DAPAGLIFLOZIN PROPANEDIOL (FARXIGA) 5 MG TAB TABLET    Take 5 mg by mouth once daily.    ESOMEPRAZOLE (NEXIUM) 40 MG CAPSULE    Take 40 mg by mouth once daily.    FLUOCINOLONE (DERMA-SMOOTHE) 0.01 % EXTERNAL OIL    Apply to scalp nightly as directed, prn for flares    GLYBURIDE (DIABETA) 5 MG TABLET    Take 5 mg by mouth 2 (two) times daily with meals.    HYDRALAZINE (APRESOLINE) 50 MG TABLET    Take 50 mg by mouth once daily.    ISOSORBIDE MONONITRATE (IMDUR) 30 MG 24 HR TABLET    Take 30 mg by mouth once daily.    KETOCONAZOLE (NIZORAL) 2 % SHAMPOO    Use as a scalp treatment 2-3 times a week for maintenance, massaging into scalp and leaving on for up to 3 minutes before rinsing    LEVOTHYROXINE (SYNTHROID) 50 MCG TABLET    Take 50 mcg by mouth before breakfast.    LORATADINE (CLARITIN) 10 MG TABLET    Take 10 mg by mouth once daily.    MAGNESIUM OXIDE (MAG-OX) 400 MG (241.3 MG MAGNESIUM) TABLET    Take 1 tablet by mouth.    MAGNESIUM OXIDE 400 MG MAGNESIUM TAB    Take 1 tablet by mouth 2 (two) times a day.    MESALAMINE (ASACOL HD) 800 MG TBEC    Take 3 tablets (2,400 mg total) by mouth 2 (two) times a day.    METFORMIN (GLUCOPHAGE) 1000 MG TABLET    Take 1,000 mg by mouth 2 (two) times daily with meals.    METFORMIN (GLUCOPHAGE) 500 MG TABLET    Take 500 mg by mouth 2 (two) times daily with meals.    NITROGLYCERIN (NITROSTAT) 0.4 MG SL TABLET    Place 0.4 mg under the tongue every 5 (five) minutes as needed for Chest pain.    ONDANSETRON (ZOFRAN) 4 MG TABLET    Take 1 tablet (4 mg total) by mouth every 6 (six) hours.    ONDANSETRON (ZOFRAN-ODT) 4 MG TBDL    Take 1 tablet (4 mg total) by mouth every 6 (six) hours as needed.    POLYETHYLENE GLYCOL (GLYCOLAX) 17 GRAM PWPK    Take by mouth.    PRAVASTATIN (PRAVACHOL) 40 MG TABLET    Take 40 mg by mouth  "once daily.    SUCRALFATE (CARAFATE) 1 GRAM TABLET    Take 1 tablet (1 g total) by mouth 4 (four) times daily as needed (Epigastric pain).    TORSEMIDE (DEMADEX) 20 MG TAB    Take 10 mg by mouth once daily.    VALSARTAN (DIOVAN) 80 MG TABLET    Take 80 mg by mouth once daily.    VEDOLIZUMAB (ENTYVIO IV)    Inject 300 mg into the vein every 8 weeks.    VERAPAMIL (VERELAN) 240 MG C24P    Take 240 mg by mouth once daily.    VITAMIN D (VITAMIN D3) 1000 UNITS TAB    Take 5,000 Units by mouth once daily.        Objective Findings:  Vital Signs:  BP (!) 145/71   Pulse 69   Ht 5' 2" (1.575 m)   Wt 63.9 kg (140 lb 12.8 oz)   SpO2 98%   BMI 25.75 kg/m²  Body mass index is 25.75 kg/m².    Physical Exam:  Physical Exam  Vitals and nursing note reviewed.   Constitutional:       General: She is not in acute distress.     Appearance: Normal appearance.   HENT:      Mouth/Throat:      Mouth: Mucous membranes are moist.   Cardiovascular:      Rate and Rhythm: Normal rate.   Abdominal:      General: Bowel sounds are normal. There is no distension.      Palpations: Abdomen is soft. There is no mass.      Tenderness: There is no abdominal tenderness.   Skin:     General: Skin is warm and dry.      Coloration: Skin is not jaundiced or pale.   Neurological:      Mental Status: She is alert and oriented to person, place, and time.   Psychiatric:         Mood and Affect: Mood normal.          Labs:  Lab Results   Component Value Date    WBC 6.49 09/18/2024    HGB 11.8 (L) 09/18/2024    HCT 35.5 (L) 09/18/2024    MCV 88.5 09/18/2024    RDW 13.3 09/18/2024     09/18/2024    LYMPH 16.0 (L) 09/18/2024    LYMPH 1.04 09/18/2024    MONO 6.9 (H) 09/18/2024    EOS 0.15 09/18/2024    BASO 0.03 09/18/2024     Lab Results   Component Value Date     09/18/2024    K 3.8 09/18/2024     09/18/2024    CO2 26 09/18/2024     (H) 09/18/2024    BUN 18 09/18/2024    CREATININE 1.15 (H) 09/18/2024    CALCIUM 9.8 09/18/2024    PROT " 6.8 09/18/2024    ALBUMIN 3.6 09/18/2024    BILITOT 0.3 09/18/2024    ALKPHOS 99 09/18/2024    AST 20 09/18/2024    ALT 24 09/18/2024         Imaging: Colonoscopy    Addendum Date: 12/2/2024    Procedure Date 11/27/24 Impression Overall Impression: One polyp measuring 10-19 mm in the ascending colon; performed hot snare removal; the lesion was ablated Performed forceps biopsies in the ascending colon, transverse colon, descending colon, sigmoid colon and rectum Diverticulosis in the ascending colon, descending colon and sigmoid colon Digital rectal exam revealed no mass.  The colon was exam from the rectum to the cecum.  Pandiverticulosis was present.  A 10-12 mm polyp was removed with hot snare polypectomy from the proximal ascending colon and residual polyp tissue was ablated with APC.  The colon mucosa showed no evidence of active inflammation and biopsies were obtained from each portion of the colon. Impression:  Chronic diarrhea, ulcerative colitis which appears to be suppressed, colon diverticulosis, 1 polyp was removed during this exam. Recommendation Await pathology results, due: 12/2/2024 Repeat colonoscopy in 3 years Disposition:  Discharge patient to home in stable condition.  Resume diet.  No driving until tomorrow.  Follow up pathology next week.  Follow up with PCP as scheduled, return to GI clinic for follow up. Indication Ulcerative proctitis with complication, Ulcerative colitis with complication, unspecified location Providers Jovanni Ortega Jr., Adi Hannah CRNA, MD Proceduralist Mj Barrett, Patient Care  Deuce Dixon CRNA CRNA Wagner, Kimberly M RN Registered Nurse Medications Moderate sedation administered by anesthesia staff - See anesthesia record. Preprocedure A history and physical has been performed, and patient medication allergies have been reviewed. The patient's tolerance of previous anesthesia has been reviewed. The risks and benefits of  the procedure and the sedation options and risks were discussed with the patient. All questions were answered and informed consent obtained. Details of the Procedure The patient underwent monitored anesthesia care, which was administered by an anesthesia professional. The patient's heart rate, blood pressure, level of consciousness, respirations, oxygen, ECG and ETCO2 were monitored throughout the procedure. A digital rectal exam was performed. A perianal exam was performed. The scope was introduced through the anus and advanced to the cecum. Retroflexion was performed in the rectum. The quality of bowel preparation was evaluated using the [place] Bowel Preparation Scale with scores of: right colon = 2, transverse colon = 2, left colon = 2. The total BBPS score was 6. Bowel prep was adequate. The patient's estimated blood loss was minimal (<5 mL). The procedure was not difficult. The patient tolerated the procedure well. There were no apparent adverse events. Scope: Colonoscope Scope Serial: [id number] Events Procedure Events Event Event Time Procedure Events Event Event Time ENDO SCOPE IN TIME 11/27/2024 12:13 PM ENDO CECUM REACHED 11/27/2024 12:20 PM ENDO SCOPE OUT TIME 11/27/2024 12:33 PM CECAL WITHDRAWAL TIME: 13m 46s Findings One polyp measuring 10-19 mm in the ascending colon; performed hot snare removal; the lesion was ablated Performed multiple forceps biopsies in the ascending colon, transverse colon, descending colon, sigmoid colon and rectum Diverticula in the ascending colon, descending colon and sigmoid colon; no bleeding was observed     Result Date: 12/2/2024  Table formatting from the original result was not included. Procedure Date 11/27/24 Impression Overall      One polyp measuring 10-19 mm in the ascending colon; performed hot snare removal; the lesion was ablated Performed forceps biopsies in the ascending colon, transverse colon, descending colon, sigmoid colon and rectum Diverticulosis in the  ascending colon, descending colon and sigmoid colon Digital rectal exam revealed no mass.  The colon was exam from the rectum to the cecum.  Pandiverticulosis was present.  A 10-12 mm polyp was removed with hot snare polypectomy from the proximal ascending colon and residual polyp tissue was ablated with APC.  The colon mucosa showed no evidence of active inflammation and biopsies were obtained from each portion of the colon. Impression:  Chronic diarrhea, ulcerative colitis which appears to be suppressed, colon diverticulosis, 1 polyp was removed during this exam. Recommendation Await pathology results, due: 12/2/2024 No further screening colonoscopies necessary Disposition:  Discharge patient to home in stable condition.  Resume diet.  No driving until tomorrow.  Follow up pathology next week.  Follow up with PCP as scheduled, return to GI clinic for follow up. Indication Ulcerative proctitis with complication, Ulcerative colitis with complication, unspecified location Providers Jovanni Ortega Jr., Adi Hannah CRNA, MD Proceduralist Mj Barrett, Patient Care  Deuce Dixon CRNA CRNA Wagner, Kimberly M, RN Registered Nurse Medications Moderate sedation administered by anesthesia staff - See anesthesia record. Preprocedure A history and physical has been performed, and patient medication allergies have been reviewed. The patient's tolerance of previous anesthesia has been reviewed. The risks and benefits of the procedure and the sedation options and risks were discussed with the patient. All questions were answered and informed consent obtained. Details of the Procedure The patient underwent monitored anesthesia care, which was administered by an anesthesia professional. The patient's heart rate, blood pressure, level of consciousness, respirations, oxygen, ECG and ETCO2 were monitored throughout the procedure. A digital rectal exam was performed. A perianal exam was  "performed. The scope was introduced through the anus and advanced to the cecum. Retroflexion was performed in the rectum. The quality of bowel preparation was evaluated using the [place] Bowel Preparation Scale with scores of: right colon = 2, transverse colon = 2, left colon = 2. The total BBPS score was 6. Bowel prep was adequate. The patient's estimated blood loss was minimal (<5 mL). The procedure was not difficult. The patient tolerated the procedure well. There were no apparent adverse events. Scope: Colonoscope Scope Serial: [id number] Events Procedure Events Event Event Time Procedure Events Event Event Time ENDO SCOPE IN TIME 11/27/2024 12:13 PM ENDO CECUM REACHED 11/27/2024 12:20 PM ENDO SCOPE OUT TIME 11/27/2024 12:33 PM CECAL WITHDRAWAL TIME: 13m 46s Findings One polyp measuring 10-19 mm in the ascending colon; performed hot snare removal; the lesion was ablated Performed multiple forceps biopsies in the ascending colon, transverse colon, descending colon, sigmoid colon and rectum Diverticula in the ascending colon, descending colon and sigmoid colon; no bleeding was observed         Assessment:  Jose Russell is a 79 y.o. female here with:  1. Ulcerative colitis with complication, unspecified location    2. Iron deficiency anemia due to chronic blood loss          Recommendations:  1. Continue Entyvio infusions  2. We will continue current dose of Asacol per Dr. aSlcido  3. Avoid NSAID's  4. If diarrhea returns may consider reducing or alternative to metformin.  Consider metformin ER.  This would be deferred to endocrinology.    Portions of this note may have been created with voice recognition software.  Occasional wrong word or "sound a like substitutions may have occurred due to inherent limitations of voice recognition software.  Please read the note carefully and recognize using contexts, where substitutions have occurred.    Diagnosis, risks, benefits, and side effects of any medications and " treatment plan were discussed with the patient.  All questions were answered to the satisfaction of the patient, and patient verbalized understanding and agreement to the treatment plan.        Follow up in about 3 months (around 3/19/2025).      Order summary:       Thank you for allowing me to participate in the care of Jose Russell.      CHRIS Abdi

## 2025-01-27 ENCOUNTER — INFUSION (OUTPATIENT)
Dept: INFUSION THERAPY | Facility: HOSPITAL | Age: 80
End: 2025-01-27
Attending: NURSE PRACTITIONER
Payer: MEDICARE

## 2025-01-27 VITALS
RESPIRATION RATE: 17 BRPM | DIASTOLIC BLOOD PRESSURE: 65 MMHG | SYSTOLIC BLOOD PRESSURE: 151 MMHG | HEART RATE: 67 BPM | OXYGEN SATURATION: 98 %

## 2025-01-27 DIAGNOSIS — K51.919 ULCERATIVE COLITIS WITH COMPLICATION, UNSPECIFIED LOCATION: Primary | ICD-10-CM

## 2025-01-27 PROCEDURE — 63600175 PHARM REV CODE 636 W HCPCS: Mod: JZ,TB | Performed by: NURSE PRACTITIONER

## 2025-01-27 PROCEDURE — 63600175 PHARM REV CODE 636 W HCPCS: Performed by: OBSTETRICS & GYNECOLOGY

## 2025-01-27 PROCEDURE — 25000003 PHARM REV CODE 250: Performed by: NURSE PRACTITIONER

## 2025-01-27 PROCEDURE — 96375 TX/PRO/DX INJ NEW DRUG ADDON: CPT

## 2025-01-27 PROCEDURE — 96365 THER/PROPH/DIAG IV INF INIT: CPT

## 2025-01-27 RX ORDER — IPRATROPIUM BROMIDE AND ALBUTEROL SULFATE 2.5; .5 MG/3ML; MG/3ML
3 SOLUTION RESPIRATORY (INHALATION)
Status: ACTIVE | OUTPATIENT
Start: 2025-01-27 | End: 2025-01-27

## 2025-01-27 RX ORDER — EPINEPHRINE 1 MG/ML
0.3 INJECTION, SOLUTION, CONCENTRATE INTRAVENOUS
Status: ACTIVE | OUTPATIENT
Start: 2025-01-27 | End: 2025-01-27

## 2025-01-27 RX ORDER — ACETAMINOPHEN 325 MG/1
650 TABLET ORAL ONCE
Status: COMPLETED | OUTPATIENT
Start: 2025-01-27 | End: 2025-01-27

## 2025-01-27 RX ORDER — ACETAMINOPHEN 325 MG/1
650 TABLET ORAL
Status: ACTIVE | OUTPATIENT
Start: 2025-01-27 | End: 2025-01-27

## 2025-01-27 RX ORDER — METHYLPREDNISOLONE SOD SUCC 125 MG
125 VIAL (EA) INJECTION
Status: COMPLETED | OUTPATIENT
Start: 2025-01-27 | End: 2025-01-27

## 2025-01-27 RX ORDER — SODIUM CHLORIDE 0.9 % (FLUSH) 0.9 %
10 SYRINGE (ML) INJECTION
Status: DISCONTINUED | OUTPATIENT
Start: 2025-01-27 | End: 2025-01-27 | Stop reason: HOSPADM

## 2025-01-27 RX ORDER — DIPHENHYDRAMINE HYDROCHLORIDE 50 MG/ML
25 INJECTION INTRAMUSCULAR; INTRAVENOUS
Status: DISPENSED | OUTPATIENT
Start: 2025-01-27 | End: 2025-01-27

## 2025-01-27 RX ORDER — METHYLPREDNISOLONE SOD SUCC 125 MG
40 VIAL (EA) INJECTION
Status: ACTIVE | OUTPATIENT
Start: 2025-01-27 | End: 2025-01-27

## 2025-01-27 RX ADMIN — DIPHENHYDRAMINE HYDROCHLORIDE 25 MG: 50 INJECTION INTRAMUSCULAR; INTRAVENOUS at 12:01

## 2025-01-27 RX ADMIN — ACETAMINOPHEN 650 MG: 325 TABLET ORAL at 12:01

## 2025-01-27 RX ADMIN — METHYLPREDNISOLONE SODIUM SUCCINATE 65 MG: 125 INJECTION, POWDER, FOR SOLUTION INTRAMUSCULAR; INTRAVENOUS at 12:01

## 2025-01-27 RX ADMIN — VEDOLIZUMAB 300 MG: 300 INJECTION, POWDER, LYOPHILIZED, FOR SOLUTION INTRAVENOUS at 12:01

## 2025-01-27 NOTE — PROGRESS NOTES
1145 Pt here for Entyvio infusion, resting in recliner, TP released and pharmacy notified    Pre meds given and tolerated (requested half dose of steroid due to being diabetic)  1217 Entyvio infusion started to infuse over 30 min  1250 Entyvio infusion complete, tolerated well, will continue to monitor  1310 pt discharged ambulatory with no adverse reaction noted, pt notified to go to ER with any adverse reactions, AVS given along with medication information  Follow up appt made 8 weeks

## 2025-03-24 ENCOUNTER — INFUSION (OUTPATIENT)
Dept: INFUSION THERAPY | Facility: HOSPITAL | Age: 80
End: 2025-03-24
Attending: NURSE PRACTITIONER
Payer: MEDICARE

## 2025-03-24 VITALS
HEART RATE: 58 BPM | DIASTOLIC BLOOD PRESSURE: 66 MMHG | RESPIRATION RATE: 17 BRPM | SYSTOLIC BLOOD PRESSURE: 137 MMHG | OXYGEN SATURATION: 97 %

## 2025-03-24 DIAGNOSIS — K51.919 ULCERATIVE COLITIS WITH COMPLICATION, UNSPECIFIED LOCATION: Primary | ICD-10-CM

## 2025-03-24 PROCEDURE — 96375 TX/PRO/DX INJ NEW DRUG ADDON: CPT

## 2025-03-24 PROCEDURE — 96365 THER/PROPH/DIAG IV INF INIT: CPT

## 2025-03-24 PROCEDURE — 25000003 PHARM REV CODE 250: Performed by: NURSE PRACTITIONER

## 2025-03-24 PROCEDURE — 63600175 PHARM REV CODE 636 W HCPCS: Mod: JZ,TB | Performed by: NURSE PRACTITIONER

## 2025-03-24 RX ORDER — DIPHENHYDRAMINE HYDROCHLORIDE 50 MG/ML
25 INJECTION, SOLUTION INTRAMUSCULAR; INTRAVENOUS
Status: COMPLETED | OUTPATIENT
Start: 2025-03-24 | End: 2025-03-24

## 2025-03-24 RX ORDER — ACETAMINOPHEN 325 MG/1
650 TABLET ORAL ONCE
Status: COMPLETED | OUTPATIENT
Start: 2025-03-24 | End: 2025-03-24

## 2025-03-24 RX ORDER — SODIUM CHLORIDE 0.9 % (FLUSH) 0.9 %
10 SYRINGE (ML) INJECTION
Status: DISCONTINUED | OUTPATIENT
Start: 2025-03-24 | End: 2025-03-24 | Stop reason: HOSPADM

## 2025-03-24 RX ORDER — METHYLPREDNISOLONE SOD SUCC 125 MG
125 VIAL (EA) INJECTION
Status: COMPLETED | OUTPATIENT
Start: 2025-03-24 | End: 2025-03-24

## 2025-03-24 RX ADMIN — METHYLPREDNISOLONE SODIUM SUCCINATE 62.5 MG: 125 INJECTION, POWDER, FOR SOLUTION INTRAMUSCULAR; INTRAVENOUS at 12:03

## 2025-03-24 RX ADMIN — DIPHENHYDRAMINE HYDROCHLORIDE 25 MG: 50 INJECTION INTRAMUSCULAR; INTRAVENOUS at 12:03

## 2025-03-24 RX ADMIN — ACETAMINOPHEN 650 MG: 325 TABLET ORAL at 12:03

## 2025-03-24 RX ADMIN — VEDOLIZUMAB 300 MG: 300 INJECTION, POWDER, LYOPHILIZED, FOR SOLUTION INTRAVENOUS at 12:03

## 2025-03-24 NOTE — PROGRESS NOTES
Patient arrived for Entyvio infusion today ambulatory to bay. Therapy plan released and pharmacy notified. Patients int started to right AC.  1218 infusion started  1251 infusion finished  1251 Int flushed   1310 Patient discharged , given AVS and told to watch for signs and symptoms of adverse reactions , if had any to go to the ER. Patient given upcoming appointment

## 2025-03-27 ENCOUNTER — OFFICE VISIT (OUTPATIENT)
Dept: GASTROENTEROLOGY | Facility: CLINIC | Age: 80
End: 2025-03-27
Payer: MEDICARE

## 2025-03-27 VITALS
BODY MASS INDEX: 26.25 KG/M2 | DIASTOLIC BLOOD PRESSURE: 61 MMHG | OXYGEN SATURATION: 96 % | SYSTOLIC BLOOD PRESSURE: 125 MMHG | WEIGHT: 142.63 LBS | HEIGHT: 62 IN | HEART RATE: 67 BPM

## 2025-03-27 DIAGNOSIS — R53.83 FATIGUE, UNSPECIFIED TYPE: ICD-10-CM

## 2025-03-27 DIAGNOSIS — E83.42 HYPOMAGNESEMIA: ICD-10-CM

## 2025-03-27 DIAGNOSIS — K21.9 GASTROESOPHAGEAL REFLUX DISEASE, UNSPECIFIED WHETHER ESOPHAGITIS PRESENT: ICD-10-CM

## 2025-03-27 DIAGNOSIS — K90.89 OTHER INTESTINAL MALABSORPTION: ICD-10-CM

## 2025-03-27 DIAGNOSIS — K51.919 ULCERATIVE COLITIS WITH COMPLICATION, UNSPECIFIED LOCATION: Primary | ICD-10-CM

## 2025-03-27 PROCEDURE — 99999 PR PBB SHADOW E&M-EST. PATIENT-LVL V: CPT | Mod: PBBFAC,,, | Performed by: NURSE PRACTITIONER

## 2025-03-27 PROCEDURE — 99214 OFFICE O/P EST MOD 30 MIN: CPT | Mod: S$PBB,,, | Performed by: NURSE PRACTITIONER

## 2025-03-27 PROCEDURE — 99215 OFFICE O/P EST HI 40 MIN: CPT | Mod: PBBFAC | Performed by: NURSE PRACTITIONER

## 2025-03-27 NOTE — PROGRESS NOTES
Jose Russell is a 79 y.o. female here for Follow-up        PCP: Alyssa Michele  Referring Provider: Johana Corbin, Wen  [address] [place] [address] -   [place] [id number]     HPI:  Presents for follow-up due to ulcerative colitis.  Patient had a colonoscopy on 11/27/2024, report reviewed, no active inflammation, no granuloma.  tubulovillous adenoma was removed from the ascending colon.  Patient is currently receiving Entyvio infusions.  Entyvio infusion on 03/24/2025.  She is also taking Asacol.  In his attempt to wean off Asacol previously she did have a return of symptoms.  Patient denies any hematochezia or melena.  No abdominal pain.  She does have some loose stool but this has improved.  Loose stool was attributed to metformin.  She has since been started on a new blood pressure medicine that does cause constipation.  She states that the metformin and new medication are balancing each other out and that diarrhea has improved.  She is also taking magnesium prescribed by Cardiology.  She does have history of hypomagnesium.  Today she reports that she is having increased fatigue.  States that she has no energy.  Reports that she has been feeling bad over the last 2 months.  States that she noticed increased fatigue following Prolia shot.  No fever.  Gastric emptying study on 06/05/2024 normal.  Patient is being followed by endocrinology for diabetes.          ROS:  Review of Systems   Constitutional:  Positive for fatigue. Negative for appetite change, fever and unexpected weight change.   HENT:  Negative for trouble swallowing.    Gastrointestinal:  Positive for diarrhea. Negative for abdominal pain, anal bleeding, blood in stool, change in bowel habit, constipation, nausea, vomiting and reflux.   Musculoskeletal:  Positive for arthralgias. Negative for gait problem.   Integumentary:  Negative for pallor.   Hematological:  Does not bruise/bleed easily.   Psychiatric/Behavioral:  The patient  is not nervous/anxious.           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    PSHX:  has a past surgical history that includes Colonoscopy (06/12/2020); Esophagogastroduodenoscopy (05/24/2018); Hysterectomy; Cholecystectomy; and Nephrectomy.    PFHX: family history is not on file.    PSlHX:  reports that she has never smoked. She has never used smokeless tobacco. She reports that she does not drink alcohol and does not use drugs.        Review of patient's allergies indicates:   Allergen Reactions    Levofloxacin in d5w     Mercaptopurine analogues (thiopurines)     Penicillins     Sulfa (sulfonamide antibiotics)        Medication List with Changes/Refills   Current Medications    ALLOPURINOL (ZYLOPRIM) 100 MG TABLET    Take 200 mg by mouth once daily.    ASPIRIN (ECOTRIN) 81 MG EC TABLET    Take 81 mg by mouth once daily.    ATENOLOL (TENORMIN) 25 MG TABLET    Take 25 mg by mouth once daily.    AZITHROMYCIN (ZITHROMAX Z-AKHIL) 250 MG TABLET    Take two tablets today and then one tablet daily for four days    BIFIDOBACTERIUM INFANTIS (ALIGN ORAL)    Take by mouth once daily.    CITALOPRAM (CELEXA) 20 MG TABLET    Take 20 mg by mouth once daily.    CLOBETASOL (CLOBEX) 0.05 % SHAMPOO    Apply topically 2 (two) times daily.    CLOBETASOL (TEMOVATE) 0.05 % EXTERNAL SOLUTION    Apply to AA on scalp BID PRN flares    DAPAGLIFLOZIN PROPANEDIOL (FARXIGA) 5 MG TAB TABLET    Take 5 mg by mouth once daily.    ESOMEPRAZOLE (NEXIUM) 40 MG CAPSULE    Take 40 mg by mouth once daily.    FLUOCINOLONE (DERMA-SMOOTHE) 0.01 % EXTERNAL OIL    Apply to scalp nightly as directed, prn for flares    GLYBURIDE  (DIABETA) 5 MG TABLET    Take 5 mg by mouth 2 (two) times daily with meals.    HYDRALAZINE (APRESOLINE) 50 MG TABLET    Take 50 mg by mouth once daily.    ISOSORBIDE MONONITRATE (IMDUR) 30 MG 24 HR TABLET    Take 30 mg by mouth once daily.    KETOCONAZOLE (NIZORAL) 2 % SHAMPOO    Use as a scalp treatment 2-3 times a week for maintenance, massaging into scalp and leaving on for up to 3 minutes before rinsing    LEVOTHYROXINE (SYNTHROID) 50 MCG TABLET    Take 50 mcg by mouth before breakfast.    LORATADINE (CLARITIN) 10 MG TABLET    Take 10 mg by mouth once daily.    MAGNESIUM OXIDE (MAG-OX) 400 MG (241.3 MG MAGNESIUM) TABLET    Take 1 tablet by mouth.    MAGNESIUM OXIDE 400 MG MAGNESIUM TAB    Take 1 tablet by mouth 2 (two) times a day.    MESALAMINE (ASACOL HD) 800 MG TBEC    Take 3 tablets (2,400 mg total) by mouth 2 (two) times a day.    METFORMIN (GLUCOPHAGE) 1000 MG TABLET    Take 1,000 mg by mouth 2 (two) times daily with meals.    METFORMIN (GLUCOPHAGE) 500 MG TABLET    Take 500 mg by mouth 2 (two) times daily with meals.    NITROGLYCERIN (NITROSTAT) 0.4 MG SL TABLET    Place 0.4 mg under the tongue every 5 (five) minutes as needed for Chest pain.    ONDANSETRON (ZOFRAN) 4 MG TABLET    Take 1 tablet (4 mg total) by mouth every 6 (six) hours.    ONDANSETRON (ZOFRAN-ODT) 4 MG TBDL    Take 1 tablet (4 mg total) by mouth every 6 (six) hours as needed.    POLYETHYLENE GLYCOL (GLYCOLAX) 17 GRAM PWPK    Take by mouth.    PRAVASTATIN (PRAVACHOL) 40 MG TABLET    Take 40 mg by mouth once daily.    SUCRALFATE (CARAFATE) 1 GRAM TABLET    Take 1 tablet (1 g total) by mouth 4 (four) times daily as needed (Epigastric pain).    TORSEMIDE (DEMADEX) 20 MG TAB    Take 10 mg by mouth once daily.    VALSARTAN (DIOVAN) 80 MG TABLET    Take 80 mg by mouth once daily.    VEDOLIZUMAB (ENTYVIO IV)    Inject 300 mg into the vein every 8 weeks.    VERAPAMIL (VERELAN) 240 MG C24P    Take 240 mg by mouth once daily.    VITAMIN D (VITAMIN  "D3) 1000 UNITS TAB    Take 5,000 Units by mouth once daily.        Objective Findings:  Vital Signs:  /61 (BP Location: Left arm, Patient Position: Sitting)   Pulse 67   Ht 5' 2" (1.575 m)   Wt 64.7 kg (142 lb 9.6 oz)   SpO2 96%   BMI 26.08 kg/m²  Body mass index is 26.08 kg/m².    Physical Exam:  Physical Exam  Vitals and nursing note reviewed.   Constitutional:       General: She is not in acute distress.     Appearance: Normal appearance.   HENT:      Mouth/Throat:      Mouth: Mucous membranes are moist.   Cardiovascular:      Rate and Rhythm: Normal rate.   Pulmonary:      Effort: Pulmonary effort is normal.   Abdominal:      General: Bowel sounds are normal.      Palpations: Abdomen is soft.   Skin:     General: Skin is warm and dry.      Coloration: Skin is not jaundiced or pale.   Neurological:      Mental Status: She is alert and oriented to person, place, and time.   Psychiatric:         Mood and Affect: Mood normal.          Labs:  Lab Results   Component Value Date    WBC 6.49 09/18/2024    HGB 11.8 (L) 09/18/2024    HCT 35.5 (L) 09/18/2024    MCV 88.5 09/18/2024    RDW 13.3 09/18/2024     09/18/2024    LYMPH 16.0 (L) 09/18/2024    LYMPH 1.04 09/18/2024    MONO 6.9 (H) 09/18/2024    EOS 0.15 09/18/2024    BASO 0.03 09/18/2024     Lab Results   Component Value Date     09/18/2024    K 3.8 09/18/2024     09/18/2024    CO2 26 09/18/2024     (H) 09/18/2024    BUN 18 09/18/2024    CREATININE 1.15 (H) 09/18/2024    CALCIUM 9.8 09/18/2024    PROT 6.8 09/18/2024    ALBUMIN 3.6 09/18/2024    BILITOT 0.3 09/18/2024    ALKPHOS 99 09/18/2024    AST 20 09/18/2024    ALT 24 09/18/2024         Imaging: No results found.      Assessment:  Jose Russell is a 79 y.o. female here with:  1. Ulcerative colitis with complication, unspecified location    2. Fatigue, unspecified type    3. Gastroesophageal reflux disease, unspecified whether esophagitis present    4. Other intestinal " "malabsorption    5. Hypomagnesemia          Recommendations:  1. Continue Entyvio infusions  2. CBC, iron studies, B12 and folate, CMP, magnesium  3. Avoid NSAID's  4. Follow up with primary care    Portions of this note may have been created with voice recognition software.  Occasional wrong word or "sound a like substitutions may have occurred due to inherent limitations of voice recognition software.  Please read the note carefully and recognize using contexts, where substitutions have occurred.    Diagnosis, risks, benefits, and side effects of any medications and treatment plan were discussed with the patient.  All questions were answered to the satisfaction of the patient, and patient verbalized understanding and agreement to the treatment plan.        Follow up in about 6 months (around 9/27/2025).      Order summary:  Orders Placed This Encounter    CBC Auto Differential    Iron and TIBC    Ferritin    Vitamin B12 & Folate    Comprehensive Metabolic Panel    Magnesium       Thank you for allowing me to participate in the care of Jose Russell.      CHRIS Abdi          "

## 2025-03-28 ENCOUNTER — RESULTS FOLLOW-UP (OUTPATIENT)
Dept: GASTROENTEROLOGY | Facility: CLINIC | Age: 80
End: 2025-03-28

## 2025-05-19 ENCOUNTER — TELEPHONE (OUTPATIENT)
Dept: INFUSION THERAPY | Facility: HOSPITAL | Age: 80
End: 2025-05-19
Payer: MEDICARE

## 2025-05-19 NOTE — TELEPHONE ENCOUNTER
1155 pt and daughter are worried about getting infusion today.  Pt has had pain in upper back across shoulder blades.  She has been seeing cardiologist ever couple of weeks for BP issues.  She has a cardiologist appt on Wednesday and wants to get check out prior to getting infusion today.  She will call me to reschedule.  All information given to providers office.

## 2025-05-23 ENCOUNTER — INFUSION (OUTPATIENT)
Dept: INFUSION THERAPY | Facility: HOSPITAL | Age: 80
End: 2025-05-23
Attending: NURSE PRACTITIONER
Payer: MEDICARE

## 2025-05-23 VITALS
OXYGEN SATURATION: 99 % | RESPIRATION RATE: 17 BRPM | HEART RATE: 68 BPM | DIASTOLIC BLOOD PRESSURE: 68 MMHG | SYSTOLIC BLOOD PRESSURE: 135 MMHG

## 2025-05-23 DIAGNOSIS — K51.919 ULCERATIVE COLITIS WITH COMPLICATION, UNSPECIFIED LOCATION: Primary | ICD-10-CM

## 2025-05-23 PROCEDURE — 96375 TX/PRO/DX INJ NEW DRUG ADDON: CPT

## 2025-05-23 PROCEDURE — 63600175 PHARM REV CODE 636 W HCPCS: Performed by: NURSE PRACTITIONER

## 2025-05-23 PROCEDURE — 96365 THER/PROPH/DIAG IV INF INIT: CPT

## 2025-05-23 PROCEDURE — 25000003 PHARM REV CODE 250: Performed by: NURSE PRACTITIONER

## 2025-05-23 RX ORDER — SODIUM CHLORIDE 0.9 % (FLUSH) 0.9 %
10 SYRINGE (ML) INJECTION
Status: DISCONTINUED | OUTPATIENT
Start: 2025-05-23 | End: 2025-05-23 | Stop reason: HOSPADM

## 2025-05-23 RX ORDER — DIPHENHYDRAMINE HYDROCHLORIDE 50 MG/ML
25 INJECTION, SOLUTION INTRAMUSCULAR; INTRAVENOUS ONCE
Status: COMPLETED | OUTPATIENT
Start: 2025-05-23 | End: 2025-05-23

## 2025-05-23 RX ORDER — METHYLPREDNISOLONE SOD SUCC 125 MG
125 VIAL (EA) INJECTION
Status: COMPLETED | OUTPATIENT
Start: 2025-05-23 | End: 2025-05-23

## 2025-05-23 RX ORDER — ACETAMINOPHEN 325 MG/1
650 TABLET ORAL ONCE
Status: COMPLETED | OUTPATIENT
Start: 2025-05-23 | End: 2025-05-23

## 2025-05-23 RX ADMIN — VEDOLIZUMAB 300 MG: 300 INJECTION, POWDER, LYOPHILIZED, FOR SOLUTION INTRAVENOUS at 01:05

## 2025-05-23 RX ADMIN — ACETAMINOPHEN 650 MG: 325 TABLET ORAL at 01:05

## 2025-05-23 RX ADMIN — METHYLPREDNISOLONE SODIUM SUCCINATE 62.5 MG: 125 INJECTION, POWDER, FOR SOLUTION INTRAMUSCULAR; INTRAVENOUS at 01:05

## 2025-05-23 RX ADMIN — DIPHENHYDRAMINE HYDROCHLORIDE 25 MG: 50 INJECTION INTRAMUSCULAR; INTRAVENOUS at 01:05

## 2025-05-23 NOTE — PROGRESS NOTES
Patient arrived for Entyvio infusion today ambulatory to bay. Therapy plan released and pharmacy notified. Patients int started to right AC.  Patient given premedication :  Tylenol 650mg PO  Benadryl 25mg IVP  Solumedrol 62.5mg IVP  1333 infusion started  1403 infusion finished  1403 Int flushed   1415 Patient discharged , given AVS and told to watch for signs and symptoms of adverse reactions , if had any to go to the ER. Patient given upcoming appointment in 8 weeks.

## 2025-07-21 ENCOUNTER — INFUSION (OUTPATIENT)
Dept: INFUSION THERAPY | Facility: HOSPITAL | Age: 80
End: 2025-07-21
Attending: NURSE PRACTITIONER
Payer: MEDICARE

## 2025-07-21 VITALS
SYSTOLIC BLOOD PRESSURE: 136 MMHG | OXYGEN SATURATION: 95 % | DIASTOLIC BLOOD PRESSURE: 63 MMHG | RESPIRATION RATE: 17 BRPM | WEIGHT: 142 LBS | BODY MASS INDEX: 25.97 KG/M2 | HEART RATE: 68 BPM

## 2025-07-21 DIAGNOSIS — K51.919 ULCERATIVE COLITIS WITH COMPLICATION, UNSPECIFIED LOCATION: Primary | ICD-10-CM

## 2025-07-21 PROCEDURE — 96365 THER/PROPH/DIAG IV INF INIT: CPT

## 2025-07-21 PROCEDURE — 96375 TX/PRO/DX INJ NEW DRUG ADDON: CPT

## 2025-07-21 PROCEDURE — 25000003 PHARM REV CODE 250: Performed by: NURSE PRACTITIONER

## 2025-07-21 PROCEDURE — 63600175 PHARM REV CODE 636 W HCPCS: Mod: JZ,TB | Performed by: NURSE PRACTITIONER

## 2025-07-21 RX ORDER — METHYLPREDNISOLONE SOD SUCC 125 MG
125 VIAL (EA) INJECTION
Start: 2025-09-15

## 2025-07-21 RX ORDER — EPINEPHRINE 1 MG/ML
0.3 INJECTION, SOLUTION, CONCENTRATE INTRAVENOUS
OUTPATIENT
Start: 2025-09-15

## 2025-07-21 RX ORDER — SODIUM CHLORIDE 0.9 % (FLUSH) 0.9 %
10 SYRINGE (ML) INJECTION
Status: DISCONTINUED | OUTPATIENT
Start: 2025-07-21 | End: 2025-07-21 | Stop reason: HOSPADM

## 2025-07-21 RX ORDER — ACETAMINOPHEN 325 MG/1
650 TABLET ORAL ONCE
Status: COMPLETED | OUTPATIENT
Start: 2025-07-21 | End: 2025-07-21

## 2025-07-21 RX ORDER — ACETAMINOPHEN 325 MG/1
650 TABLET ORAL
Status: ACTIVE | OUTPATIENT
Start: 2025-07-21 | End: 2025-07-21

## 2025-07-21 RX ORDER — METHYLPREDNISOLONE SOD SUCC 125 MG
40 VIAL (EA) INJECTION
Status: ACTIVE | OUTPATIENT
Start: 2025-07-21 | End: 2025-07-21

## 2025-07-21 RX ORDER — METHYLPREDNISOLONE SOD SUCC 125 MG
125 VIAL (EA) INJECTION
Status: COMPLETED | OUTPATIENT
Start: 2025-07-21 | End: 2025-07-21

## 2025-07-21 RX ORDER — SODIUM CHLORIDE 0.9 % (FLUSH) 0.9 %
10 SYRINGE (ML) INJECTION
OUTPATIENT
Start: 2025-09-15

## 2025-07-21 RX ORDER — DIPHENHYDRAMINE HYDROCHLORIDE 50 MG/ML
25 INJECTION, SOLUTION INTRAMUSCULAR; INTRAVENOUS
Status: ACTIVE | OUTPATIENT
Start: 2025-07-21 | End: 2025-07-21

## 2025-07-21 RX ORDER — METHYLPREDNISOLONE SOD SUCC 125 MG
40 VIAL (EA) INJECTION
OUTPATIENT
Start: 2025-09-15

## 2025-07-21 RX ORDER — IPRATROPIUM BROMIDE AND ALBUTEROL SULFATE 2.5; .5 MG/3ML; MG/3ML
3 SOLUTION RESPIRATORY (INHALATION)
Status: ACTIVE | OUTPATIENT
Start: 2025-07-21 | End: 2025-07-21

## 2025-07-21 RX ORDER — IPRATROPIUM BROMIDE AND ALBUTEROL SULFATE 2.5; .5 MG/3ML; MG/3ML
3 SOLUTION RESPIRATORY (INHALATION)
OUTPATIENT
Start: 2025-09-15

## 2025-07-21 RX ORDER — EPINEPHRINE 0.1 MG/ML
0.3 INJECTION INTRAVENOUS
Status: ACTIVE | OUTPATIENT
Start: 2025-07-21 | End: 2025-07-21

## 2025-07-21 RX ORDER — ACETAMINOPHEN 325 MG/1
650 TABLET ORAL
OUTPATIENT
Start: 2025-09-15

## 2025-07-21 RX ORDER — DIPHENHYDRAMINE HYDROCHLORIDE 50 MG/ML
25 INJECTION, SOLUTION INTRAMUSCULAR; INTRAVENOUS
OUTPATIENT
Start: 2025-09-15

## 2025-07-21 RX ORDER — ACETAMINOPHEN 325 MG/1
650 TABLET ORAL ONCE
OUTPATIENT
Start: 2025-09-15 | End: 2025-09-15

## 2025-07-21 RX ORDER — DIPHENHYDRAMINE HYDROCHLORIDE 50 MG/ML
25 INJECTION, SOLUTION INTRAMUSCULAR; INTRAVENOUS ONCE
Status: COMPLETED | OUTPATIENT
Start: 2025-07-21 | End: 2025-07-21

## 2025-07-21 RX ADMIN — METHYLPREDNISOLONE SODIUM SUCCINATE 62.5 MG: 125 INJECTION, POWDER, FOR SOLUTION INTRAMUSCULAR; INTRAVENOUS at 12:07

## 2025-07-21 RX ADMIN — VEDOLIZUMAB 300 MG: 300 INJECTION, POWDER, LYOPHILIZED, FOR SOLUTION INTRAVENOUS at 12:07

## 2025-07-21 RX ADMIN — ACETAMINOPHEN 650 MG: 325 TABLET ORAL at 12:07

## 2025-07-21 RX ADMIN — DIPHENHYDRAMINE HYDROCHLORIDE 25 MG: 50 INJECTION INTRAMUSCULAR; INTRAVENOUS at 12:07

## 2025-07-21 NOTE — PROGRESS NOTES
1150 Pt here for Entyvio infusion, resting in recliner, TP released and pharmacy notified    Tylenol 650mg PO  Benadryl 25mg IVP  Solumedrol 62.5mg IVP  1222 Entyvio infusion started to infuse over 30 min  1255 Entyvio infusion complete, tolerated well, will continue to monitor  1320 pt discharged ambulatory with no adverse reaction noted, pt notified to go to ER with any adverse reactions, AVS given along with medication information  Follow up appt made 8 weeks